# Patient Record
Sex: MALE | Race: WHITE | NOT HISPANIC OR LATINO | URBAN - METROPOLITAN AREA
[De-identification: names, ages, dates, MRNs, and addresses within clinical notes are randomized per-mention and may not be internally consistent; named-entity substitution may affect disease eponyms.]

---

## 2020-08-08 ENCOUNTER — EMERGENCY (EMERGENCY)
Facility: HOSPITAL | Age: 25
LOS: 0 days | Discharge: HOME | End: 2020-08-08
Payer: COMMERCIAL

## 2020-08-08 VITALS
TEMPERATURE: 98 F | DIASTOLIC BLOOD PRESSURE: 54 MMHG | OXYGEN SATURATION: 100 % | HEART RATE: 67 BPM | SYSTOLIC BLOOD PRESSURE: 110 MMHG | RESPIRATION RATE: 18 BRPM

## 2020-08-08 DIAGNOSIS — J02.9 ACUTE PHARYNGITIS, UNSPECIFIED: ICD-10-CM

## 2020-08-08 PROCEDURE — 99283 EMERGENCY DEPT VISIT LOW MDM: CPT

## 2020-08-08 PROCEDURE — U0003: CPT

## 2020-08-08 NOTE — ED STATDOCS - PATIENT PORTAL LINK FT
You can access the FollowMyHealth Patient Portal offered by [facility] by registering at the following website: [email or web address]. By joining [employer]’s FollowMyHealth portal, you will also be able to view your health information using other applications (apps) compatible with our system.

## 2020-08-08 NOTE — ED STATDOCS - NS ED ROS FT
ROS:   Constitutional- no fever, no chills.    ENT- no rhinorrhea, + sore throat, no congestion.    Cardiac- no chest pain, no palpitations,   Respiratory- no cough, no SOB    Abdomen- No nausea, no vomiting, no diarrhea.    Urinary- no dysuria, no urgency, no frequency.    Skin- No rashes

## 2020-08-08 NOTE — ED STATDOCS - PHYSICAL EXAMINATION
Constitutional: NAD AAOx3. Nontoxic, well appearing. Speaking full sentences  w/o distress  Eyes: EOMI, pupils equal  Head: Normocephalic atraumatic  Mouth: no airway obstruction  Cardiac: [id number]   Resp: Lungs CTAB  GI: Abd s/nt/nd  Neuro: motor and sensory intact

## 2020-08-08 NOTE — ED STATDOCS - OBJECTIVE STATEMENT
Pt presents to ED with  sore throat x 1 day. pt is [employer] . Pt unsure if exposed to COVID.   Pt concerned for possible COVID-19.   Pt here for testing.

## 2020-08-09 LAB — SARS-COV-2 RNA SPEC QL NAA+PROBE: SIGNIFICANT CHANGE UP

## 2023-08-05 ENCOUNTER — INPATIENT (INPATIENT)
Facility: HOSPITAL | Age: 28
LOS: 14 days | Discharge: HOME | DRG: 270 | End: 2023-08-20
Attending: INTERNAL MEDICINE | Admitting: INTERNAL MEDICINE
Payer: COMMERCIAL

## 2023-08-05 VITALS — WEIGHT: 160.06 LBS | HEIGHT: 72 IN

## 2023-08-05 DIAGNOSIS — R31.9 HEMATURIA, UNSPECIFIED: ICD-10-CM

## 2023-08-05 DIAGNOSIS — M25.473 EFFUSION, UNSPECIFIED ANKLE: ICD-10-CM

## 2023-08-05 DIAGNOSIS — I82.A12 ACUTE EMBOLISM AND THROMBOSIS OF LEFT AXILLARY VEIN: ICD-10-CM

## 2023-08-05 DIAGNOSIS — R18.8 OTHER ASCITES: ICD-10-CM

## 2023-08-05 DIAGNOSIS — I82.612 ACUTE EMBOLISM AND THROMBOSIS OF SUPERFICIAL VEINS OF LEFT UPPER EXTREMITY: ICD-10-CM

## 2023-08-05 DIAGNOSIS — R20.0 ANESTHESIA OF SKIN: ICD-10-CM

## 2023-08-05 DIAGNOSIS — I82.B12 ACUTE EMBOLISM AND THROMBOSIS OF LEFT SUBCLAVIAN VEIN: ICD-10-CM
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
## 2023-08-05 DIAGNOSIS — R10.12 LEFT UPPER QUADRANT PAIN: ICD-10-CM

## 2023-08-05 DIAGNOSIS — J90 PLEURAL EFFUSION, NOT ELSEWHERE CLASSIFIED: ICD-10-CM

## 2023-08-05 DIAGNOSIS — G47.00 INSOMNIA, UNSPECIFIED: ICD-10-CM

## 2023-08-05 DIAGNOSIS — N14.11 CONTRAST-INDUCED NEPHROPATHY: ICD-10-CM

## 2023-08-05 DIAGNOSIS — N17.0 ACUTE KIDNEY FAILURE WITH TUBULAR NECROSIS: ICD-10-CM

## 2023-08-05 DIAGNOSIS — T50.8X5A ADVERSE EFFECT OF DIAGNOSTIC AGENTS, INITIAL ENCOUNTER: ICD-10-CM

## 2023-08-05 DIAGNOSIS — E87.0 HYPEROSMOLALITY AND HYPERNATREMIA: ICD-10-CM

## 2023-08-05 DIAGNOSIS — N39.0 URINARY TRACT INFECTION, SITE NOT SPECIFIED: ICD-10-CM

## 2023-08-05 DIAGNOSIS — Z53.09 PROCEDURE AND TREATMENT NOT CARRIED OUT BECAUSE OF OTHER CONTRAINDICATION: ICD-10-CM

## 2023-08-05 DIAGNOSIS — K59.00 CONSTIPATION, UNSPECIFIED: ICD-10-CM

## 2023-08-05 DIAGNOSIS — D72.829 ELEVATED WHITE BLOOD CELL COUNT, UNSPECIFIED: ICD-10-CM

## 2023-08-05 DIAGNOSIS — R11.2 NAUSEA WITH VOMITING, UNSPECIFIED: ICD-10-CM

## 2023-08-05 PROCEDURE — 99291 CRITICAL CARE FIRST HOUR: CPT

## 2023-08-05 RX ORDER — SODIUM CHLORIDE 9 MG/ML
1000 INJECTION INTRAMUSCULAR; INTRAVENOUS; SUBCUTANEOUS ONCE
Refills: 0 | Status: COMPLETED | OUTPATIENT
Start: 2023-08-05 | End: 2023-08-05

## 2023-08-05 RX ORDER — ACETAMINOPHEN 500 MG
1000 TABLET ORAL ONCE
Refills: 0 | Status: COMPLETED | OUTPATIENT
Start: 2023-08-05 | End: 2023-08-05

## 2023-08-05 NOTE — ED ADULT NURSE NOTE - OBJECTIVE STATEMENT
28 yo Male co left arm pain since last night. Pt states that he started having left sided shoulder pain yesterday night before sleeping and thought it was a muscle injury from working out. Pt stated in the morning his left arm started swelling and changing color from his fingertips to his biceps. Pt states it feels like his arm is asleep. Pt's left arm visibly bigger, discolored, and colder than right arm. Pt is able to move fingers and denies pain. Pt upper extremities equal in strength. Pt's family member is concerned it could be a clot in his arm. Pt denies blood thinners. MD BANERJEE made aware and evaluated pt. Pt on cardiac monitor

## 2023-08-05 NOTE — ED ADULT NURSE NOTE - CHIEF COMPLAINT QUOTE
Patient having swelling to the left arm from fingers to bicep, noticeable difference from other arm and arm different in color to other arm.  + pulses, denies injury or any type of wound.

## 2023-08-05 NOTE — ED PROVIDER NOTE - OBJECTIVE STATEMENT
28 yo male pw  left arm swelling and pain with purple discoloration that started yesterday. No fever or chills, no ho trauma

## 2023-08-05 NOTE — ED ADULT TRIAGE NOTE - NSWEIGHTCALCTOOLDRUG_GEN_A_CORE
[Fever] : fever [Nasal Congestion] : nasal congestion [Sore Throat] : no sore throat  used [Cough] : no cough [Vomiting] : no vomiting [Diarrhea] : no diarrhea

## 2023-08-05 NOTE — ED PROVIDER NOTE - PHYSICAL EXAMINATION
Gen:  Well appearning in NAD  Head:  NC/AT  Resp: No distress   Ext: no deformities RHD, left arm from shoulder to fingers with swelling, and purple discoloration, warm, +2 radial pulse, MRU intact  Skin: warm and dry as visualized  psych: AAO x 4, pleasant and cooperative

## 2023-08-05 NOTE — ED PROVIDER NOTE - CRITICAL CARE ATTENDING CONTRIBUTION TO CARE
direct patient care (not related to procedure), additional history taking, interpretation of diagnostic studies, documentation, consultation with other physicians, consult w/ pt's family directly relating to pts condition  B Iwona JOHNSON

## 2023-08-05 NOTE — ED PROVIDER NOTE - CLINICAL SUMMARY MEDICAL DECISION MAKING FREE TEXT BOX
pt with left arm swelling since yesterday will get labs and us r/o dvt    1240 spoke withradioliogist pt with occlusive left axillary dvt, placed call to vascular, pt tba and will start heparin

## 2023-08-06 DIAGNOSIS — I82.409 ACUTE EMBOLISM AND THROMBOSIS OF UNSPECIFIED DEEP VEINS OF UNSPECIFIED LOWER EXTREMITY: ICD-10-CM

## 2023-08-06 PROBLEM — Z78.9 OTHER SPECIFIED HEALTH STATUS: Chronic | Status: ACTIVE | Noted: 2020-08-08

## 2023-08-06 LAB
ADD ON TEST-SPECIMEN IN LAB: SIGNIFICANT CHANGE UP
ALBUMIN SERPL ELPH-MCNC: 4.4 G/DL — SIGNIFICANT CHANGE UP (ref 3.3–5)
ALP SERPL-CCNC: 74 U/L — SIGNIFICANT CHANGE UP (ref 40–120)
ALT FLD-CCNC: 23 U/L — SIGNIFICANT CHANGE UP (ref 12–78)
ANION GAP SERPL CALC-SCNC: 6 MMOL/L — SIGNIFICANT CHANGE UP (ref 5–17)
APTT BLD: 122.7 SEC — CRITICAL HIGH (ref 24.5–35.6)
APTT BLD: 59.5 SEC — HIGH (ref 24.5–35.6)
APTT BLD: 70.8 SEC — HIGH (ref 24.5–35.6)
AST SERPL-CCNC: 29 U/L — SIGNIFICANT CHANGE UP (ref 15–37)
BASOPHILS # BLD AUTO: 0.02 K/UL — SIGNIFICANT CHANGE UP (ref 0–0.2)
BASOPHILS NFR BLD AUTO: 0.2 % — SIGNIFICANT CHANGE UP (ref 0–2)
BILIRUB SERPL-MCNC: 0.6 MG/DL — SIGNIFICANT CHANGE UP (ref 0.2–1.2)
BUN SERPL-MCNC: 22 MG/DL — SIGNIFICANT CHANGE UP (ref 7–23)
CALCIUM SERPL-MCNC: 9.6 MG/DL — SIGNIFICANT CHANGE UP (ref 8.5–10.1)
CHLORIDE SERPL-SCNC: 107 MMOL/L — SIGNIFICANT CHANGE UP (ref 96–108)
CO2 SERPL-SCNC: 28 MMOL/L — SIGNIFICANT CHANGE UP (ref 22–31)
CREAT SERPL-MCNC: 1.15 MG/DL — SIGNIFICANT CHANGE UP (ref 0.5–1.3)
D DIMER BLD IA.RAPID-MCNC: 376 NG/ML DDU — HIGH
EGFR: 91 ML/MIN/1.73M2 — SIGNIFICANT CHANGE UP
EOSINOPHIL # BLD AUTO: 0 K/UL — SIGNIFICANT CHANGE UP (ref 0–0.5)
EOSINOPHIL NFR BLD AUTO: 0 % — SIGNIFICANT CHANGE UP (ref 0–6)
GLUCOSE SERPL-MCNC: 122 MG/DL — HIGH (ref 70–99)
HCT VFR BLD CALC: 39.8 % — SIGNIFICANT CHANGE UP (ref 39–50)
HCT VFR BLD CALC: 40.3 % — SIGNIFICANT CHANGE UP (ref 39–50)
HCT VFR BLD CALC: 43 % — SIGNIFICANT CHANGE UP (ref 39–50)
HGB BLD-MCNC: 13.7 G/DL — SIGNIFICANT CHANGE UP (ref 13–17)
HGB BLD-MCNC: 13.8 G/DL — SIGNIFICANT CHANGE UP (ref 13–17)
HGB BLD-MCNC: 15 G/DL — SIGNIFICANT CHANGE UP (ref 13–17)
IMM GRANULOCYTES NFR BLD AUTO: 0.3 % — SIGNIFICANT CHANGE UP (ref 0–0.9)
INR BLD: 1.13 RATIO — SIGNIFICANT CHANGE UP (ref 0.85–1.18)
LYMPHOCYTES # BLD AUTO: 0.71 K/UL — LOW (ref 1–3.3)
LYMPHOCYTES # BLD AUTO: 6 % — LOW (ref 13–44)
MCHC RBC-ENTMCNC: 29.4 PG — SIGNIFICANT CHANGE UP (ref 27–34)
MCHC RBC-ENTMCNC: 29.9 PG — SIGNIFICANT CHANGE UP (ref 27–34)
MCHC RBC-ENTMCNC: 30.1 PG — SIGNIFICANT CHANGE UP (ref 27–34)
MCHC RBC-ENTMCNC: 34 GM/DL — SIGNIFICANT CHANGE UP (ref 32–36)
MCHC RBC-ENTMCNC: 34.7 GM/DL — SIGNIFICANT CHANGE UP (ref 32–36)
MCHC RBC-ENTMCNC: 34.9 GM/DL — SIGNIFICANT CHANGE UP (ref 32–36)
MCV RBC AUTO: 86.3 FL — SIGNIFICANT CHANGE UP (ref 80–100)
MCV RBC AUTO: 86.3 FL — SIGNIFICANT CHANGE UP (ref 80–100)
MCV RBC AUTO: 86.5 FL — SIGNIFICANT CHANGE UP (ref 80–100)
MONOCYTES # BLD AUTO: 0.48 K/UL — SIGNIFICANT CHANGE UP (ref 0–0.9)
MONOCYTES NFR BLD AUTO: 4.1 % — SIGNIFICANT CHANGE UP (ref 2–14)
NEUTROPHILS # BLD AUTO: 10.5 K/UL — HIGH (ref 1.8–7.4)
NEUTROPHILS NFR BLD AUTO: 89.4 % — HIGH (ref 43–77)
PLATELET # BLD AUTO: 198 K/UL — SIGNIFICANT CHANGE UP (ref 150–400)
PLATELET # BLD AUTO: 232 K/UL — SIGNIFICANT CHANGE UP (ref 150–400)
PLATELET # BLD AUTO: 259 K/UL — SIGNIFICANT CHANGE UP (ref 150–400)
POTASSIUM SERPL-MCNC: 4.1 MMOL/L — SIGNIFICANT CHANGE UP (ref 3.5–5.3)
POTASSIUM SERPL-SCNC: 4.1 MMOL/L — SIGNIFICANT CHANGE UP (ref 3.5–5.3)
PROT SERPL-MCNC: 8.4 GM/DL — HIGH (ref 6–8.3)
PROTHROM AB SERPL-ACNC: 12.7 SEC — SIGNIFICANT CHANGE UP (ref 9.5–13)
RBC # BLD: 4.61 M/UL — SIGNIFICANT CHANGE UP (ref 4.2–5.8)
RBC # BLD: 4.66 M/UL — SIGNIFICANT CHANGE UP (ref 4.2–5.8)
RBC # BLD: 4.98 M/UL — SIGNIFICANT CHANGE UP (ref 4.2–5.8)
RBC # FLD: 11.9 % — SIGNIFICANT CHANGE UP (ref 10.3–14.5)
RBC # FLD: 12 % — SIGNIFICANT CHANGE UP (ref 10.3–14.5)
RBC # FLD: 12.2 % — SIGNIFICANT CHANGE UP (ref 10.3–14.5)
SODIUM SERPL-SCNC: 141 MMOL/L — SIGNIFICANT CHANGE UP (ref 135–145)
WBC # BLD: 11.72 K/UL — HIGH (ref 3.8–10.5)
WBC # BLD: 11.75 K/UL — HIGH (ref 3.8–10.5)
WBC # BLD: 8.66 K/UL — SIGNIFICANT CHANGE UP (ref 3.8–10.5)
WBC # FLD AUTO: 11.72 K/UL — HIGH (ref 3.8–10.5)
WBC # FLD AUTO: 11.75 K/UL — HIGH (ref 3.8–10.5)
WBC # FLD AUTO: 8.66 K/UL — SIGNIFICANT CHANGE UP (ref 3.8–10.5)

## 2023-08-06 PROCEDURE — 83735 ASSAY OF MAGNESIUM: CPT

## 2023-08-06 PROCEDURE — 99497 ADVNCD CARE PLAN 30 MIN: CPT | Mod: 25

## 2023-08-06 PROCEDURE — 99222 1ST HOSP IP/OBS MODERATE 55: CPT

## 2023-08-06 PROCEDURE — 87086 URINE CULTURE/COLONY COUNT: CPT

## 2023-08-06 PROCEDURE — 37187 VENOUS MECH THROMBECTOMY: CPT

## 2023-08-06 PROCEDURE — 84300 ASSAY OF URINE SODIUM: CPT

## 2023-08-06 PROCEDURE — 86900 BLOOD TYPING SEROLOGIC ABO: CPT

## 2023-08-06 PROCEDURE — 81001 URINALYSIS AUTO W/SCOPE: CPT

## 2023-08-06 PROCEDURE — 93971 EXTREMITY STUDY: CPT | Mod: LT

## 2023-08-06 PROCEDURE — C1887: CPT

## 2023-08-06 PROCEDURE — 71552 MRI CHEST W/O & W/DYE: CPT

## 2023-08-06 PROCEDURE — C1757: CPT

## 2023-08-06 PROCEDURE — 71275 CT ANGIOGRAPHY CHEST: CPT | Mod: 26

## 2023-08-06 PROCEDURE — 80076 HEPATIC FUNCTION PANEL: CPT

## 2023-08-06 PROCEDURE — C1769: CPT

## 2023-08-06 PROCEDURE — 76705 ECHO EXAM OF ABDOMEN: CPT

## 2023-08-06 PROCEDURE — 86901 BLOOD TYPING SEROLOGIC RH(D): CPT

## 2023-08-06 PROCEDURE — 80048 BASIC METABOLIC PNL TOTAL CA: CPT

## 2023-08-06 PROCEDURE — 71045 X-RAY EXAM CHEST 1 VIEW: CPT

## 2023-08-06 PROCEDURE — 36011 PLACE CATHETER IN VEIN: CPT

## 2023-08-06 PROCEDURE — A9579: CPT

## 2023-08-06 PROCEDURE — C1894: CPT

## 2023-08-06 PROCEDURE — 82570 ASSAY OF URINE CREATININE: CPT

## 2023-08-06 PROCEDURE — C1725: CPT

## 2023-08-06 PROCEDURE — 84100 ASSAY OF PHOSPHORUS: CPT

## 2023-08-06 PROCEDURE — 80053 COMPREHEN METABOLIC PANEL: CPT

## 2023-08-06 PROCEDURE — 82550 ASSAY OF CK (CPK): CPT

## 2023-08-06 PROCEDURE — 93975 VASCULAR STUDY: CPT

## 2023-08-06 PROCEDURE — 86920 COMPATIBILITY TEST SPIN: CPT

## 2023-08-06 PROCEDURE — 71045 X-RAY EXAM CHEST 1 VIEW: CPT | Mod: 26

## 2023-08-06 PROCEDURE — 74018 RADEX ABDOMEN 1 VIEW: CPT

## 2023-08-06 PROCEDURE — 83070 ASSAY OF HEMOSIDERIN QUAL: CPT

## 2023-08-06 PROCEDURE — 86850 RBC ANTIBODY SCREEN: CPT

## 2023-08-06 PROCEDURE — 82150 ASSAY OF AMYLASE: CPT

## 2023-08-06 PROCEDURE — 83874 ASSAY OF MYOGLOBIN: CPT

## 2023-08-06 PROCEDURE — 84156 ASSAY OF PROTEIN URINE: CPT

## 2023-08-06 PROCEDURE — 93971 EXTREMITY STUDY: CPT | Mod: 26,LT

## 2023-08-06 PROCEDURE — C9113: CPT

## 2023-08-06 PROCEDURE — 76770 US EXAM ABDO BACK WALL COMP: CPT

## 2023-08-06 PROCEDURE — 36415 COLL VENOUS BLD VENIPUNCTURE: CPT

## 2023-08-06 PROCEDURE — 76000 FLUOROSCOPY <1 HR PHYS/QHP: CPT

## 2023-08-06 PROCEDURE — 85025 COMPLETE CBC W/AUTO DIFF WBC: CPT

## 2023-08-06 PROCEDURE — 99221 1ST HOSP IP/OBS SF/LOW 40: CPT

## 2023-08-06 PROCEDURE — 71275 CT ANGIOGRAPHY CHEST: CPT

## 2023-08-06 PROCEDURE — 85730 THROMBOPLASTIN TIME PARTIAL: CPT

## 2023-08-06 PROCEDURE — 85610 PROTHROMBIN TIME: CPT

## 2023-08-06 PROCEDURE — 93010 ELECTROCARDIOGRAM REPORT: CPT

## 2023-08-06 PROCEDURE — 76700 US EXAM ABDOM COMPLETE: CPT

## 2023-08-06 PROCEDURE — 83690 ASSAY OF LIPASE: CPT

## 2023-08-06 PROCEDURE — 85027 COMPLETE CBC AUTOMATED: CPT

## 2023-08-06 PROCEDURE — 37248 TRLUML BALO ANGIOP 1ST VEIN: CPT

## 2023-08-06 RX ORDER — HEPARIN SODIUM 5000 [USP'U]/ML
3000 INJECTION INTRAVENOUS; SUBCUTANEOUS EVERY 6 HOURS
Refills: 0 | Status: DISCONTINUED | OUTPATIENT
Start: 2023-08-06 | End: 2023-08-09

## 2023-08-06 RX ORDER — HEPARIN SODIUM 5000 [USP'U]/ML
INJECTION INTRAVENOUS; SUBCUTANEOUS
Qty: 25000 | Refills: 0 | Status: DISCONTINUED | OUTPATIENT
Start: 2023-08-06 | End: 2023-08-09

## 2023-08-06 RX ORDER — ONDANSETRON 8 MG/1
4 TABLET, FILM COATED ORAL EVERY 8 HOURS
Refills: 0 | Status: DISCONTINUED | OUTPATIENT
Start: 2023-08-06 | End: 2023-08-14

## 2023-08-06 RX ORDER — HEPARIN SODIUM 5000 [USP'U]/ML
INJECTION INTRAVENOUS; SUBCUTANEOUS
Qty: 25000 | Refills: 0 | Status: DISCONTINUED | OUTPATIENT
Start: 2023-08-06 | End: 2023-08-06

## 2023-08-06 RX ORDER — HEPARIN SODIUM 5000 [USP'U]/ML
4700 INJECTION INTRAVENOUS; SUBCUTANEOUS ONCE
Refills: 0 | Status: COMPLETED | OUTPATIENT
Start: 2023-08-06 | End: 2023-08-06

## 2023-08-06 RX ORDER — HEPARIN SODIUM 5000 [USP'U]/ML
4700 INJECTION INTRAVENOUS; SUBCUTANEOUS EVERY 6 HOURS
Refills: 0 | Status: DISCONTINUED | OUTPATIENT
Start: 2023-08-06 | End: 2023-08-06

## 2023-08-06 RX ORDER — HEPARIN SODIUM 5000 [USP'U]/ML
6500 INJECTION INTRAVENOUS; SUBCUTANEOUS EVERY 6 HOURS
Refills: 0 | Status: DISCONTINUED | OUTPATIENT
Start: 2023-08-06 | End: 2023-08-09

## 2023-08-06 RX ORDER — ACETAMINOPHEN 500 MG
650 TABLET ORAL EVERY 6 HOURS
Refills: 0 | Status: DISCONTINUED | OUTPATIENT
Start: 2023-08-06 | End: 2023-08-12

## 2023-08-06 RX ORDER — LANOLIN ALCOHOL/MO/W.PET/CERES
3 CREAM (GRAM) TOPICAL AT BEDTIME
Refills: 0 | Status: DISCONTINUED | OUTPATIENT
Start: 2023-08-06 | End: 2023-08-20

## 2023-08-06 RX ADMIN — HEPARIN SODIUM 950 UNIT(S)/HR: 5000 INJECTION INTRAVENOUS; SUBCUTANEOUS at 15:59

## 2023-08-06 RX ADMIN — SODIUM CHLORIDE 2000 MILLILITER(S): 9 INJECTION INTRAMUSCULAR; INTRAVENOUS; SUBCUTANEOUS at 00:46

## 2023-08-06 RX ADMIN — HEPARIN SODIUM 4700 UNIT(S): 5000 INJECTION INTRAVENOUS; SUBCUTANEOUS at 01:42

## 2023-08-06 RX ADMIN — HEPARIN SODIUM 1400 UNIT(S)/HR: 5000 INJECTION INTRAVENOUS; SUBCUTANEOUS at 19:38

## 2023-08-06 RX ADMIN — HEPARIN SODIUM 950 UNIT(S)/HR: 5000 INJECTION INTRAVENOUS; SUBCUTANEOUS at 01:49

## 2023-08-06 RX ADMIN — HEPARIN SODIUM 1400 UNIT(S)/HR: 5000 INJECTION INTRAVENOUS; SUBCUTANEOUS at 17:03

## 2023-08-06 RX ADMIN — Medication 400 MILLIGRAM(S): at 00:46

## 2023-08-06 RX ADMIN — HEPARIN SODIUM 950 UNIT(S)/HR: 5000 INJECTION INTRAVENOUS; SUBCUTANEOUS at 07:17

## 2023-08-06 RX ADMIN — HEPARIN SODIUM 950 UNIT(S)/HR: 5000 INJECTION INTRAVENOUS; SUBCUTANEOUS at 08:51

## 2023-08-06 RX ADMIN — HEPARIN SODIUM 950 UNIT(S)/HR: 5000 INJECTION INTRAVENOUS; SUBCUTANEOUS at 04:45

## 2023-08-06 NOTE — CONSULT NOTE ADULT - ASSESSMENT
otherwise healthy 27-year-old male presents with left upper extremity axillary vein thrombus     unaware of history of trauma   does acknowledge she works out vigorously including lifting weights 4 times a week     no family history of unexplained thromboses     CT angiogram of chest does not reveal any masses     report does acknowledge limited evaluation of axillary and subclavian veins bilaterally     overall impression with a left upper extremity DVT  would be mechanical process possible thoracic outlet syndrome     Case reviewed with hospitalist and surgery consultation     agree with plan to remove thrombus  via IR or vascular surgery     agree with additional radiographic study either MRI or CT to more adequately assess possible thoracic outlet syndrome     if found additional management as per thoracic/vascular surgery service     for completeness can see us in several weeks and an outpatient setting will we will send off comprehensive laboratory studies to rule out a congenital or acquired thrombophilia     based on family and prior history I believe that this is unlikely     above reviewed in detail with patient, mother father and girlfriend at bedside, surgical consult and hospitalist service

## 2023-08-06 NOTE — PROGRESS NOTE ADULT - SUBJECTIVE AND OBJECTIVE BOX
CC: Acute Left Upper Ext DVT     HPI: 26 yo Male presented with complain of  left arm swelling and pain with purple discoloration that started yesterday. No fever or chills, No ho trauma. No recent travel. No cough. No shortness of breath. No recent trauma.         INTERVAL HPI/ OVERNIGHT EVENTS:  chart reviewed, Pt was seen and examined, conformed history above, reports that firs noted L shoulder pain and then developed L arm swelling. Pt denies any medical history, no family history of clot disorders. Exercises but denies use of supplements.  Denies SOB or cough, no recent weight loss. L arm pain improved, still with swelling     Vital Signs Last 24 Hrs  T(C): 36.6 (06 Aug 2023 08:33), Max: 36.8 (05 Aug 2023 23:07)  T(F): 97.9 (06 Aug 2023 08:33), Max: 98.2 (05 Aug 2023 23:07)  HR: 69 (06 Aug 2023 08:33) (57 - 69)  BP: 127/54 (06 Aug 2023 08:33) (121/51 - 127/54)  BP(mean): 95 (06 Aug 2023 02:00) (95 - 95)  RR: 18 (06 Aug 2023 08:33) (17 - 18)  SpO2: 99% (06 Aug 2023 08:33) (99% - 100%)    Parameters below as of 06 Aug 2023 08:33  Patient On (Oxygen Delivery Method): room air        REVIEW OF SYSTEMS:  All other review of systems is negative unless indicated above.      PHYSICAL EXAM:  General: Well developed; in no acute distress  Eyes:   EOMI; conjunctiva and sclera clear  Head: Normocephalic; atraumatic  ENMT: No nasal discharge; airway clear  Neck: Supple; non tender; no masses  Respiratory: Good air entry b/l.  No wheezes, rales or rhonchi  Cardiovascular: Regular rate and rhythm. S1 and S2 Normal; No murmurs  Gastrointestinal: Soft non-tender non-distended; Normal bowel sounds  Genitourinary: No  suprapubic  tenderness  Extremities: LUE  edema, no RUE oe LE edema   Vascular: Peripheral pulses palpable 2+ bilaterally  Neurological: Alert and oriented x3, non focal   Lymph Nodes: No acute cervical adenopathy  Musculoskeletal: Normal muscle tone, without deformities  Psychiatric: Cooperative and appropriate      LABS:                         13.7   11.72 )-----------( 232      ( 06 Aug 2023 07:35 )             40.3     05 Aug 2023 23:39    141    |  107    |  22     ----------------------------<  122    4.1     |  28     |  1.15     Ca    9.6        05 Aug 2023 23:39    TPro  8.4    /  Alb  4.4    /  TBili  0.6    /  DBili  x      /  AST  29     /  ALT  23     /  AlkPhos  74     05 Aug 2023 23:39  PT/INR - ( 05 Aug 2023 23:39 )   PT: 12.7 sec;   INR: 1.13 ratio    PTT - ( 06 Aug 2023 07:35 )  PTT:70.8 sec        LIVER FUNCTIONS - ( 05 Aug 2023 23:39 )  Alb: 4.4 g/dL / Pro: 8.4 gm/dL / ALK PHOS: 74 U/L / ALT: 23 U/L / AST: 29 U/L / GGT: x           Urinalysis Basic - ( 05 Aug 2023 23:39 )  Color: x / Appearance: x / SG: x / pH: x  Gluc: 122 mg/dL / Ketone: x  / Bili: x / Urobili: x   Blood: x / Protein: x / Nitrite: x   Leuk Esterase: x / RBC: x / WBC x   Sq Epi: x / Non Sq Epi: x / Bacteria: x        MEDICATIONS  (STANDING):  heparin  Infusion.  Unit(s)/Hr (9.5 mL/Hr) IV Continuous <Continuous>    MEDICATIONS  (PRN):  acetaminophen     Tablet .. 650 milliGRAM(s) Oral every 6 hours PRN Mild Pain (1 - 3)  aluminum hydroxide/magnesium hydroxide/simethicone Suspension 30 milliLiter(s) Oral every 4 hours PRN Dyspepsia  heparin   Injectable 4700 Unit(s) IV Push every 6 hours PRN For aPTT less than 40  melatonin 3 milliGRAM(s) Oral at bedtime PRN Insomnia  ondansetron Injectable 4 milliGRAM(s) IV Push every 8 hours PRN Nausea and/or Vomiting      RADIOLOGY & ADDITIONAL TESTS:      ACC: [id number] EXAM:  US DPLX UPR EXT VEINS LTD LT   ORDERED BY: LEEANNA WOODS     PROCEDURE DATE:  08/06/2023          INTERPRETATION:  CLINICAL INFORMATION: Left upper extremity swelling.    COMPARISON: None available.    TECHNIQUE: Duplex sonography of the UPPER extremity veins with color and   spectral Doppler, with and without compression.    FINDINGS:    There is nonocclusive thrombus in the left axillary vein. The left   internal jugular, subclavian,  and brachial veins are patent and   compressible where applicable.  The basilic vein (superficial vein) is   patent and without thrombus.  The cephalic vein (superficial vein) is   patent and without thrombus.    Doppler examination shows normal spontaneous and phasic flow.    IMPRESSION:  Occlusive deep vein thrombosis involving the left axillary vein.     Previously Declined (within the last year)

## 2023-08-06 NOTE — PROGRESS NOTE ADULT - ASSESSMENT
A/P:    1. Acute Left upper Ext DVT  - LUE doppler reviewed   - C/w  Heparin drip, will further d/w hematology A/c   - Control pain   - Elevated LUE   - D/w Dr Cobian and Vascular team,  will need further imaging, ordered CTA chest and LUE venogram     2. Leukocytosis  likely reactive  UA  results not suggestive of infection   CXR personally reviewed, no infiltrates to suggest infection   Monitor for fevers         3. DVT PPX: heparin drip     Total time 50 min

## 2023-08-06 NOTE — H&P ADULT - ASSESSMENT
A/P:    1. Acute Left upper Ext DVT  -started on Heparin drip  -follow clinically  -follow Hem/Onc consult    2.  Heparin drip for LE DVT    3.  Code status: Full code.

## 2023-08-06 NOTE — H&P ADULT - HISTORY OF PRESENT ILLNESS
28 yo Male presented with complain of  left arm swelling and pain with purple discoloration that started yesterday. No fever or chills, No ho trauma. No recent travel. No cough. No shortness of breath. No recent trauma.

## 2023-08-06 NOTE — CONSULT NOTE ADULT - ATTENDING COMMENTS
Patient evaluated at the bedside. LUE DVT likely due to TOS. US and CT reviewed. Patient will need venous thrombectomy and eventual first rib resection. AC for now. To coordinate if procedure possible to be done at HNT vs transferring.

## 2023-08-06 NOTE — H&P ADULT - NSHPPHYSICALEXAM_GEN_ALL_CORE
T(C): 36.7 (08-06-23 @ 04:52), Max: 36.8 (08-05-23 @ 23:07)  HR: 57 (08-06-23 @ 04:52) (57 - 62)  BP: 121/51 (08-06-23 @ 04:52) (121/51 - 126/80)  RR: 17 (08-06-23 @ 04:52) (17 - 18)  SpO2: 100% (08-06-23 @ 04:52) (100% - 100%)    CONSTITUTIONAL: Well groomed, no apparent distress  EYES: PERRLA and symmetric, EOMI, No conjunctival or scleral injection, non-icteric  ENMT: Oral mucosa with moist membranes. Normal dentition; no pharyngeal injection or exudates             NECK: Supple, symmetric and without tracheal deviation   RESP: No respiratory distress, no use of accessory muscles; CTA b/l, no WRR  CV: RRR, +S1S2, no MRG; no JVD; no peripheral edema  GI: Soft, NT, ND, no rebound, no guarding; no palpable masses;.  LYMPH: No cervical LAD or tenderness; .  MSK: Left upper Ext: left arm from shoulder to fingers with swelling, and purple.   SKIN: No rashes or ulcers noted; .  NEURO: CN II-XII intact; normal reflexes in upper and lower extremities, sensation intact in upper and lower extremities b/l to light touch   PSYCH: Appropriate insight/judgment; A+O x 3, mood and affect appropriate, recent/remote memory intact

## 2023-08-06 NOTE — CONSULT NOTE ADULT - ASSESSMENT
27yoM presenting with LUE Axillary vein DVT    Plan:  F/u CT venogram   Suspicion for Thoracic outlet syndrome  Continue on systemic anticoagulation  Will likely need thrombectomy this admission and first rib resection as an outpatient  Continue Arm elevation and ACE wrap  Medical management as per primary team    Plan discussed with Dr. Deutsch

## 2023-08-07 LAB
ANION GAP SERPL CALC-SCNC: 2 MMOL/L — LOW (ref 5–17)
APTT BLD: 108.3 SEC — HIGH (ref 24.5–35.6)
APTT BLD: 52.2 SEC — HIGH (ref 24.5–35.6)
APTT BLD: 67.3 SEC — HIGH (ref 24.5–35.6)
BUN SERPL-MCNC: 19 MG/DL — SIGNIFICANT CHANGE UP (ref 7–23)
CALCIUM SERPL-MCNC: 8.9 MG/DL — SIGNIFICANT CHANGE UP (ref 8.5–10.1)
CHLORIDE SERPL-SCNC: 111 MMOL/L — HIGH (ref 96–108)
CO2 SERPL-SCNC: 26 MMOL/L — SIGNIFICANT CHANGE UP (ref 22–31)
CREAT SERPL-MCNC: 0.97 MG/DL — SIGNIFICANT CHANGE UP (ref 0.5–1.3)
EGFR: 111 ML/MIN/1.73M2 — SIGNIFICANT CHANGE UP
GLUCOSE SERPL-MCNC: 92 MG/DL — SIGNIFICANT CHANGE UP (ref 70–99)
HCT VFR BLD CALC: 40.8 % — SIGNIFICANT CHANGE UP (ref 39–50)
HGB BLD-MCNC: 13.9 G/DL — SIGNIFICANT CHANGE UP (ref 13–17)
MCHC RBC-ENTMCNC: 30 PG — SIGNIFICANT CHANGE UP (ref 27–34)
MCHC RBC-ENTMCNC: 34.1 GM/DL — SIGNIFICANT CHANGE UP (ref 32–36)
MCV RBC AUTO: 87.9 FL — SIGNIFICANT CHANGE UP (ref 80–100)
PLATELET # BLD AUTO: 195 K/UL — SIGNIFICANT CHANGE UP (ref 150–400)
POTASSIUM SERPL-MCNC: 4.2 MMOL/L — SIGNIFICANT CHANGE UP (ref 3.5–5.3)
POTASSIUM SERPL-SCNC: 4.2 MMOL/L — SIGNIFICANT CHANGE UP (ref 3.5–5.3)
RBC # BLD: 4.64 M/UL — SIGNIFICANT CHANGE UP (ref 4.2–5.8)
RBC # FLD: 12.1 % — SIGNIFICANT CHANGE UP (ref 10.3–14.5)
SODIUM SERPL-SCNC: 139 MMOL/L — SIGNIFICANT CHANGE UP (ref 135–145)
WBC # BLD: 6.6 K/UL — SIGNIFICANT CHANGE UP (ref 3.8–10.5)
WBC # FLD AUTO: 6.6 K/UL — SIGNIFICANT CHANGE UP (ref 3.8–10.5)

## 2023-08-07 PROCEDURE — 71552 MRI CHEST W/O & W/DYE: CPT | Mod: 26

## 2023-08-07 PROCEDURE — 99233 SBSQ HOSP IP/OBS HIGH 50: CPT

## 2023-08-07 RX ADMIN — HEPARIN SODIUM 3000 UNIT(S): 5000 INJECTION INTRAVENOUS; SUBCUTANEOUS at 21:32

## 2023-08-07 RX ADMIN — HEPARIN SODIUM 1200 UNIT(S)/HR: 5000 INJECTION INTRAVENOUS; SUBCUTANEOUS at 23:08

## 2023-08-07 RX ADMIN — HEPARIN SODIUM 1200 UNIT(S)/HR: 5000 INJECTION INTRAVENOUS; SUBCUTANEOUS at 00:03

## 2023-08-07 RX ADMIN — HEPARIN SODIUM 1000 UNIT(S)/HR: 5000 INJECTION INTRAVENOUS; SUBCUTANEOUS at 06:50

## 2023-08-07 RX ADMIN — HEPARIN SODIUM 1400 UNIT(S)/HR: 5000 INJECTION INTRAVENOUS; SUBCUTANEOUS at 00:01

## 2023-08-07 RX ADMIN — HEPARIN SODIUM 1200 UNIT(S)/HR: 5000 INJECTION INTRAVENOUS; SUBCUTANEOUS at 21:31

## 2023-08-07 RX ADMIN — HEPARIN SODIUM 1000 UNIT(S)/HR: 5000 INJECTION INTRAVENOUS; SUBCUTANEOUS at 07:33

## 2023-08-07 RX ADMIN — HEPARIN SODIUM 1000 UNIT(S)/HR: 5000 INJECTION INTRAVENOUS; SUBCUTANEOUS at 19:38

## 2023-08-07 NOTE — PROGRESS NOTE ADULT - SUBJECTIVE AND OBJECTIVE BOX
HOSPITALIST ATTENDING PROGRESS NOTE    Chart and meds reviewed.  Patient seen and examined.    CC: DVT    Subjective: No acute issues overnight. No fever. Tolerating po.     All other systems reviewed and found to be negative with the exception of what has been described above.    MEDICATIONS  (STANDING):  heparin  Infusion.  Unit(s)/Hr (14 mL/Hr) IV Continuous <Continuous>    MEDICATIONS  (PRN):  acetaminophen     Tablet .. 650 milliGRAM(s) Oral every 6 hours PRN Mild Pain (1 - 3)  aluminum hydroxide/magnesium hydroxide/simethicone Suspension 30 milliLiter(s) Oral every 4 hours PRN Dyspepsia  heparin   Injectable 6500 Unit(s) IV Push every 6 hours PRN For aPTT less than 40  heparin   Injectable 3000 Unit(s) IV Push every 6 hours PRN For aPTT between 40 - 57  melatonin 3 milliGRAM(s) Oral at bedtime PRN Insomnia  ondansetron Injectable 4 milliGRAM(s) IV Push every 8 hours PRN Nausea and/or Vomiting      VITALS:  T(F): 97.9 (08-07-23 @ 09:39), Max: 98.6 (08-06-23 @ 21:27)  HR: 70 (08-07-23 @ 09:39) (60 - 70)  BP: 111/56 (08-07-23 @ 09:39) (111/56 - 115/45)  RR: 18 (08-07-23 @ 09:39) (16 - 18)  SpO2: 100% (08-07-23 @ 09:39) (99% - 100%)      PHYSICAL EXAM:  GEN: NAD  HEENT:  pupils equal and reactive, EOMI, no oropharyngeal lesions, erythema, exudates, oral thrush  NECK:   supple, no carotid bruits, no palpable lymph nodes, no thyromegaly  CV:  +S1, +S2, regular, no murmurs or rubs  RESP:   lungs clear to auscultation bilaterally, no wheezing, rales, rhonchi, good air entry bilaterally  BREAST:  not examined  GI:  abdomen soft, non-tender, non-distended, normal BS, no bruits, no abdominal masses, no palpable masses  RECTAL:  not examined  :  not examined  MSK:   normal muscle tone, no atrophy, no rigidity, no contractions  EXT:  no clubbing, no cyanosis, no edema, no calf pain, swelling or erythema. LUE dressing CDI  VASCULAR:  pulses equal and symmetric in the upper and lower extremities  NEURO:  AAOX3, no focal neurological deficits, follows all commands, able to move extremities spontaneously  SKIN:  no ulcers, lesions or rashes    LABS:                            13.9   6.60  )-----------( 195      ( 07 Aug 2023 06:04 )             40.8     08-07    139  |  111<H>  |  19  ----------------------------<  92  4.2   |  26  |  0.97    Ca    8.9      07 Aug 2023 06:04    TPro  8.4<H>  /  Alb  4.4  /  TBili  0.6  /  DBili  x   /  AST  29  /  ALT  23  /  AlkPhos  74  08-05        LIVER FUNCTIONS - ( 05 Aug 2023 23:39 )  Alb: 4.4 g/dL / Pro: 8.4 gm/dL / ALK PHOS: 74 U/L / ALT: 23 U/L / AST: 29 U/L / GGT: x           PT/INR - ( 05 Aug 2023 23:39 )   PT: 12.7 sec;   INR: 1.13 ratio    PTT - ( 07 Aug 2023 06:04 )  PTT:108.3 sec    Urinalysis Basic - ( 07 Aug 2023 06:04 )  Color: x / Appearance: x / SG: x / pH: x  Gluc: 92 mg/dL / Ketone: x  / Bili: x / Urobili: x   Blood: x / Protein: x / Nitrite: x   Leuk Esterase: x / RBC: x / WBC x   Sq Epi: x / Non Sq Epi: x / Bacteria: x          < from: CT Angio Chest PE Protocol w/ IV Cont (08.06.23 @ 11:03) >  IMPRESSION:  No CT evidence of pulmonary embolus.    Patent superior vena cava and brachiocephalic vein bilaterally.    Limited evaluation of the subclavian and axillary vein bilaterally on   venous phase imaging. If clinically warranted, further evaluation may be   performed with dynamic MRI of the chest with dedicated thoracic outlet   syndrome protocol. Alternatively, repeat CT imaging could be performed   with dedicated thoracic outlet syndrome protocol.    5 mm  left lower lobe lung nodule.    --- End of Report ---    < end of copied text >  < from: US Duplex Venous Upper Ext Ltd, Left (08.06.23 @ 00:29) >  IMPRESSION:  Occlusive deep vein thrombosis involving the left axillary vein.    Findings were discussed by Dr. Escobar with Dr. Bustillo with read back at   12:35 AM.      < end of copied text >

## 2023-08-07 NOTE — PROGRESS NOTE ADULT - ASSESSMENT
27yoM presenting with LUE Axillary vein DVT    Plan:  CT venogram  Suspicion for Thoracic outlet syndrome  Continue on systemic anticoagulation  Will likely need thrombectomy this admission and first rib resection as an outpatient  Continue Arm elevation and ACE wrap  Medical management as per primary team    Will discuss plan with Dr. Kim

## 2023-08-07 NOTE — PROGRESS NOTE ADULT - ASSESSMENT
27-year-old male presents with left upper extremity axillary vein thrombus    -Cause unclear, but suspected mechanical process/thoracic outlet syndrome  -Continuing heparin with improvement in LUE swelling  -CT Angio chest negative  -F/u vascular recs: likely will have thrombectomy and rib surgery  -Will perform hypercoag panel outpt, however can send FVL mutation and prothrombin gene mutation testing while inpatient  -F/u with us after her discharge    Thank you for the courtesy of this consultation and we will continue to follow.    Jayme Godoy MD  [facility]  Cell: [phone]     27-year-old male presents with left upper extremity axillary vein thrombus    -Cause unclear, but suspected mechanical process/thoracic outlet syndrome  -Continuing heparin with improvement in LUE swelling  -CT Angio chest negative  -F/u vascular recs: likely will have thrombectomy and rib surgery  -Will perform hypercoag panel outpt, however can send FVL mutation and prothrombin gene mutation testing while inpatient  -F/u with us after her discharge  -F/u MRI chest to better visualize axillary/subclavian     Thank you for the courtesy of this consultation and we will continue to follow.    Jayem Godoy MD  [facility]  Cell: [phone]

## 2023-08-07 NOTE — PROGRESS NOTE ADULT - SUBJECTIVE AND OBJECTIVE BOX
Hematology/Oncology    Pt seen, reports decreased swelling in LUE  Feeling well  No sob or cp    MEDICATIONS  (STANDING):  heparin  Infusion.  Unit(s)/Hr (14 mL/Hr) IV Continuous <Continuous>    MEDICATIONS  (PRN):  acetaminophen     Tablet .. 650 milliGRAM(s) Oral every 6 hours PRN Mild Pain (1 - 3)  aluminum hydroxide/magnesium hydroxide/simethicone Suspension 30 milliLiter(s) Oral every 4 hours PRN Dyspepsia  heparin   Injectable 6500 Unit(s) IV Push every 6 hours PRN For aPTT less than 40  heparin   Injectable 3000 Unit(s) IV Push every 6 hours PRN For aPTT between 40 - 57  melatonin 3 milliGRAM(s) Oral at bedtime PRN Insomnia  ondansetron Injectable 4 milliGRAM(s) IV Push every 8 hours PRN Nausea and/or Vomiting      ROS  No fever, sweats, chills  No epistaxis, HA, sore throat       Vital Signs Last 24 Hrs  T(C): 36.6 (07 Aug 2023 09:39), Max: 37 (06 Aug 2023 21:27)  T(F): 97.9 (07 Aug 2023 09:39), Max: 98.6 (06 Aug 2023 21:27)  HR: 70 (07 Aug 2023 09:39) (60 - 70)  BP: 111/56 (07 Aug 2023 09:39) (111/56 - 115/45)  BP(mean): 61 (06 Aug 2023 21:27) (61 - 67)  RR: 18 (07 Aug 2023 09:39) (16 - 18)  SpO2: 100% (07 Aug 2023 09:39) (99% - 100%)    Parameters below as of 07 Aug 2023 09:39  Patient On (Oxygen Delivery Method): room air        PE  NAD  Awake, alert  Anicteric, MMM  RRR  CTAB  Abd soft, NT, ND  LUE edema  No rash grossly  FROM                          13.9   6.60  )-----------( 195      ( 07 Aug 2023 06:04 )             40.8       08-07    139  |  111<H>  |  19  ----------------------------<  92  4.2   |  26  |  0.97    Ca    8.9      07 Aug 2023 06:04    TPro  8.4<H>  /  Alb  4.4  /  TBili  0.6  /  DBili  x   /  AST  29  /  ALT  23  /  AlkPhos  74  08-05

## 2023-08-07 NOTE — PROGRESS NOTE ADULT - ASSESSMENT
27 year old with LUE swelling.    # Acute Thrombis LUE  - Seen by [facility] once, fu outpatient for hypercoaguable  work up.  - Needs MRI thoracic to R/U Thoracic outlet syndrome  - Continue heparin drip  - Vascular on board for possible thrombectomy, will  let us know if this can occur in house  - Monitor labs  - No family history of clots    # DVT prophylaxis  - Heparin

## 2023-08-07 NOTE — PROGRESS NOTE ADULT - SUBJECTIVE AND OBJECTIVE BOX
SURGERY DAILY PROGRESS NOTE:     Subjective:  Patient seen and examined at bedside during am rounds. L arm wrapped in ace wrap. Reports mild pain in the LUE.   AVSS. Denies any fevers, chills, n/v/d, chest pain or shortness of breath    Objective:    MEDICATIONS  (STANDING):  heparin  Infusion.  Unit(s)/Hr (14 mL/Hr) IV Continuous <Continuous>    MEDICATIONS  (PRN):  acetaminophen     Tablet .. 650 milliGRAM(s) Oral every 6 hours PRN Mild Pain (1 - 3)  aluminum hydroxide/magnesium hydroxide/simethicone Suspension 30 milliLiter(s) Oral every 4 hours PRN Dyspepsia  heparin   Injectable 6500 Unit(s) IV Push every 6 hours PRN For aPTT less than 40  heparin   Injectable 3000 Unit(s) IV Push every 6 hours PRN For aPTT between 40 - 57  melatonin 3 milliGRAM(s) Oral at bedtime PRN Insomnia  ondansetron Injectable 4 milliGRAM(s) IV Push every 8 hours PRN Nausea and/or Vomiting      Vital Signs Last 24 Hrs  T(C): 37 (06 Aug 2023 21:27), Max: 37 (06 Aug 2023 21:27)  T(F): 98.6 (06 Aug 2023 21:27), Max: 98.6 (06 Aug 2023 21:27)  HR: 60 (06 Aug 2023 21:27) (57 - 69)  BP: 115/45 (06 Aug 2023 21:27) (112/55 - 127/54)  BP(mean): 61 (06 Aug 2023 21:27) (61 - 67)  RR: 16 (06 Aug 2023 21:27) (16 - 18)  SpO2: 99% (06 Aug 2023 21:27) (99% - 100%)    Parameters below as of 06 Aug 2023 21:27  Patient On (Oxygen Delivery Method): room air      PHYSICAL EXAM     General: AAOx3, Well developed, NAD  Chest: Normal respiratory effort  Heart: RRR  Abdomen: Soft, NTND, no masses  Neuro/Psych: No localized deficits. Normal speech, normal tone  Skin: Normal, no rashes, no lesions noted.   Extremities: Warm, well perfused, no edema, Pulses intact  LUE swollen, diffuse erythema, nontender     LABS:                        13.8   8.66  )-----------( 198      ( 06 Aug 2023 23:03 )             39.8     08-05    141  |  107  |  22  ----------------------------<  122<H>  4.1   |  28  |  1.15    Ca    9.6      05 Aug 2023 23:39    TPro  8.4<H>  /  Alb  4.4  /  TBili  0.6  /  DBili  x   /  AST  29  /  ALT  23  /  AlkPhos  74  08-05    PT/INR - ( 05 Aug 2023 23:39 )   PT: 12.7 sec;   INR: 1.13 ratio         PTT - ( 06 Aug 2023 23:03 )  PTT:122.7 sec  Urinalysis Basic - ( 05 Aug 2023 23:39 )    Color: x / Appearance: x / SG: x / pH: x  Gluc: 122 mg/dL / Ketone: x  / Bili: x / Urobili: x   Blood: x / Protein: x / Nitrite: x   Leuk Esterase: x / RBC: x / WBC x   Sq Epi: x / Non Sq Epi: x / Bacteria: x        RADIOLOGY & ADDITIONAL STUDIES:  < from: CT Angio Chest PE Protocol w/ IV Cont (08.06.23 @ 11:03) >    IMPRESSION:  No CT evidence of pulmonary embolus.    Patent superior vena cava and brachiocephalic vein bilaterally.    Limited evaluation of the subclavian and axillary vein bilaterally on   venous phase imaging. If clinically warranted, further evaluation may be   performed with dynamic MRI of the chest with dedicated thoracic outlet   syndrome protocol. Alternatively, repeat CT imaging could be performed   with dedicated thoracic outlet syndrome protocol.    5 mm  left lower lobe lung nodule.    < end of copied text >  < from: US Duplex Venous Upper Ext Ltd, Left (08.06.23 @ 00:29) >  IMPRESSION:  Occlusive deep vein thrombosis involving the left axillary vein.    < end of copied text >

## 2023-08-08 LAB
ANION GAP SERPL CALC-SCNC: 5 MMOL/L — SIGNIFICANT CHANGE UP (ref 5–17)
APTT BLD: 106.5 SEC — HIGH (ref 24.5–35.6)
APTT BLD: 50.3 SEC — HIGH (ref 24.5–35.6)
APTT BLD: 64.9 SEC — HIGH (ref 24.5–35.6)
BUN SERPL-MCNC: 19 MG/DL — SIGNIFICANT CHANGE UP (ref 7–23)
CALCIUM SERPL-MCNC: 9 MG/DL — SIGNIFICANT CHANGE UP (ref 8.5–10.1)
CHLORIDE SERPL-SCNC: 111 MMOL/L — HIGH (ref 96–108)
CO2 SERPL-SCNC: 26 MMOL/L — SIGNIFICANT CHANGE UP (ref 22–31)
CREAT SERPL-MCNC: 0.99 MG/DL — SIGNIFICANT CHANGE UP (ref 0.5–1.3)
EGFR: 108 ML/MIN/1.73M2 — SIGNIFICANT CHANGE UP
GLUCOSE SERPL-MCNC: 101 MG/DL — HIGH (ref 70–99)
HCT VFR BLD CALC: 40.3 % — SIGNIFICANT CHANGE UP (ref 39–50)
HGB BLD-MCNC: 14.1 G/DL — SIGNIFICANT CHANGE UP (ref 13–17)
INR BLD: 1.11 RATIO — SIGNIFICANT CHANGE UP (ref 0.85–1.18)
MCHC RBC-ENTMCNC: 30.1 PG — SIGNIFICANT CHANGE UP (ref 27–34)
MCHC RBC-ENTMCNC: 35 GM/DL — SIGNIFICANT CHANGE UP (ref 32–36)
MCV RBC AUTO: 85.9 FL — SIGNIFICANT CHANGE UP (ref 80–100)
PLATELET # BLD AUTO: 194 K/UL — SIGNIFICANT CHANGE UP (ref 150–400)
POTASSIUM SERPL-MCNC: 4.1 MMOL/L — SIGNIFICANT CHANGE UP (ref 3.5–5.3)
POTASSIUM SERPL-SCNC: 4.1 MMOL/L — SIGNIFICANT CHANGE UP (ref 3.5–5.3)
PROTHROM AB SERPL-ACNC: 12.5 SEC — SIGNIFICANT CHANGE UP (ref 9.5–13)
RBC # BLD: 4.69 M/UL — SIGNIFICANT CHANGE UP (ref 4.2–5.8)
RBC # FLD: 11.9 % — SIGNIFICANT CHANGE UP (ref 10.3–14.5)
SODIUM SERPL-SCNC: 142 MMOL/L — SIGNIFICANT CHANGE UP (ref 135–145)
WBC # BLD: 6.87 K/UL — SIGNIFICANT CHANGE UP (ref 3.8–10.5)
WBC # FLD AUTO: 6.87 K/UL — SIGNIFICANT CHANGE UP (ref 3.8–10.5)

## 2023-08-08 PROCEDURE — 99233 SBSQ HOSP IP/OBS HIGH 50: CPT

## 2023-08-08 RX ORDER — SODIUM CHLORIDE 9 MG/ML
1000 INJECTION, SOLUTION INTRAVENOUS
Refills: 0 | Status: DISCONTINUED | OUTPATIENT
Start: 2023-08-08 | End: 2023-08-09

## 2023-08-08 RX ADMIN — HEPARIN SODIUM 1200 UNIT(S)/HR: 5000 INJECTION INTRAVENOUS; SUBCUTANEOUS at 22:25

## 2023-08-08 RX ADMIN — HEPARIN SODIUM 1200 UNIT(S)/HR: 5000 INJECTION INTRAVENOUS; SUBCUTANEOUS at 18:21

## 2023-08-08 RX ADMIN — HEPARIN SODIUM 1000 UNIT(S)/HR: 5000 INJECTION INTRAVENOUS; SUBCUTANEOUS at 07:57

## 2023-08-08 RX ADMIN — HEPARIN SODIUM 3000 UNIT(S): 5000 INJECTION INTRAVENOUS; SUBCUTANEOUS at 18:24

## 2023-08-08 RX ADMIN — HEPARIN SODIUM 1000 UNIT(S)/HR: 5000 INJECTION INTRAVENOUS; SUBCUTANEOUS at 04:14

## 2023-08-08 RX ADMIN — HEPARIN SODIUM 1000 UNIT(S)/HR: 5000 INJECTION INTRAVENOUS; SUBCUTANEOUS at 12:00

## 2023-08-08 RX ADMIN — HEPARIN SODIUM 1200 UNIT(S)/HR: 5000 INJECTION INTRAVENOUS; SUBCUTANEOUS at 19:30

## 2023-08-08 NOTE — PROGRESS NOTE ADULT - ASSESSMENT
27-year-old male presents with left upper extremity axillary vein thrombus    -MRI reviewed likely from external compression  -Continuing heparin with improvement in LUE swelling  -CT Angio chest negative  -F/u vascular recs, for thrombectomy tomorrow  -Should also have thoracic surg remove obstruction via surgery asap  -f/u vascular for duration of AC    Thank you for the courtesy of this consultation and we will continue to follow.    Jayme Godoy MD  [facility]  Cell: [phone]

## 2023-08-08 NOTE — PROGRESS NOTE ADULT - SUBJECTIVE AND OBJECTIVE BOX
Hematology/Oncology    Pt seen, continuing IV heparin  Swelling continues to decrease    MEDICATIONS  (STANDING):  heparin  Infusion.  Unit(s)/Hr (14 mL/Hr) IV Continuous <Continuous>  lactated ringers. 1000 milliLiter(s) (125 mL/Hr) IV Continuous <Continuous>    MEDICATIONS  (PRN):  acetaminophen     Tablet .. 650 milliGRAM(s) Oral every 6 hours PRN Mild Pain (1 - 3)  aluminum hydroxide/magnesium hydroxide/simethicone Suspension 30 milliLiter(s) Oral every 4 hours PRN Dyspepsia  heparin   Injectable 6500 Unit(s) IV Push every 6 hours PRN For aPTT less than 40  heparin   Injectable 3000 Unit(s) IV Push every 6 hours PRN For aPTT between 40 - 57  melatonin 3 milliGRAM(s) Oral at bedtime PRN Insomnia  ondansetron Injectable 4 milliGRAM(s) IV Push every 8 hours PRN Nausea and/or Vomiting      ROS  No fever, sweats, chills  No epistaxis, HA, sore throat  No CP, SOB, cough, sputum  No n/v/d, abd pain, melena, hematochezia  No edema  No rash  No anxiety  No back pain, joint pain  No bleeding, bruising  No dysuria, hematuria    Vital Signs Last 24 Hrs  T(C): 36.9 (08 Aug 2023 09:11), Max: 36.9 (08 Aug 2023 09:11)  T(F): 98.4 (08 Aug 2023 09:11), Max: 98.4 (08 Aug 2023 09:11)  HR: 60 (08 Aug 2023 09:11) (60 - 61)  BP: 119/61 (08 Aug 2023 09:11) (119/61 - 123/58)  BP(mean): --  RR: 18 (08 Aug 2023 09:11) (18 - 18)  SpO2: 100% (08 Aug 2023 09:11) (97% - 100%)    Parameters below as of 08 Aug 2023 09:11  Patient On (Oxygen Delivery Method): room air        PE  NAD  Awake, alert  Anicteric, MMM  RRR  CTAB  Abd soft, NT, ND  No c/c/e  No rash grossly  FROM                          14.1   6.87  )-----------( 194      ( 08 Aug 2023 03:36 )             40.3       08-08    142  |  111<H>  |  19  ----------------------------<  101<H>  4.1   |  26  |  0.99    Ca    9.0      08 Aug 2023 03:36

## 2023-08-08 NOTE — PROGRESS NOTE ADULT - ASSESSMENT
27 year old with LUE swelling.    # Acute Thrombis LUE  - Seen by Lennox once, fu outpatient for hypercoaguable  work up.  -  MRI suspicious for thoracic outlet syndrome on left side  - Continue heparin drip  - Vascular on board for possible thrombectomy, will  let us know if this can occur in house  - Monitor labs  - No family history of clots  - FU with Ortho for TOS as outpatient     # DVT prophylaxis  - Heparin

## 2023-08-08 NOTE — PROGRESS NOTE ADULT - SUBJECTIVE AND OBJECTIVE BOX
HOSPITALIST ATTENDING PROGRESS NOTE    Chart and meds reviewed.  Patient seen and examined.    CC: DVT    Subjective: Pain and swelling improved. Awaiting thrombectomy.     All other systems reviewed and found to be negative with the exception of what has been described above.    MEDICATIONS  (STANDING):  heparin  Infusion.  Unit(s)/Hr (14 mL/Hr) IV Continuous <Continuous>    MEDICATIONS  (PRN):  acetaminophen     Tablet .. 650 milliGRAM(s) Oral every 6 hours PRN Mild Pain (1 - 3)  aluminum hydroxide/magnesium hydroxide/simethicone Suspension 30 milliLiter(s) Oral every 4 hours PRN Dyspepsia  heparin   Injectable 6500 Unit(s) IV Push every 6 hours PRN For aPTT less than 40  heparin   Injectable 3000 Unit(s) IV Push every 6 hours PRN For aPTT between 40 - 57  melatonin 3 milliGRAM(s) Oral at bedtime PRN Insomnia  ondansetron Injectable 4 milliGRAM(s) IV Push every 8 hours PRN Nausea and/or Vomiting      VITALS:  T(F): 98.4 (08-08-23 @ 09:11), Max: 98.4 (08-08-23 @ 09:11)  HR: 60 (08-08-23 @ 09:11) (60 - 64)  BP: 119/61 (08-08-23 @ 09:11) (115/62 - 123/58)  RR: 18 (08-08-23 @ 09:11) (18 - 18)  SpO2: 100% (08-08-23 @ 09:11) (97% - 100%)      PHYSICAL EXAM:  GEN: NAD  HEENT:  pupils equal and reactive, EOMI, no oropharyngeal lesions, erythema, exudates, oral thrush  NECK:   supple, no carotid bruits, no palpable lymph nodes, no thyromegaly  CV:  +S1, +S2, regular, no murmurs or rubs  RESP:   lungs clear to auscultation bilaterally, no wheezing, rales, rhonchi, good air entry bilaterally  BREAST:  not examined  GI:  abdomen soft, non-tender, non-distended, normal BS, no bruits, no abdominal masses, no palpable masses  RECTAL:  not examined  :  not examined  MSK:   normal muscle tone, no atrophy, no rigidity, no contractions  EXT:  no clubbing, no cyanosis, no edema, no calf pain, swelling or erythema  VASCULAR:  pulses equal and symmetric in the upper and lower extremities  NEURO:  AAOX3, no focal neurological deficits, follows all commands, able to move extremities spontaneously  SKIN:  no ulcers, lesions or rashes. Left UE wrap CDI    LABS:                            14.1   6.87  )-----------( 194      ( 08 Aug 2023 03:36 )             40.3     08-08    142  |  111<H>  |  19  ----------------------------<  101<H>  4.1   |  26  |  0.99    Ca    9.0      08 Aug 2023 03:36            PT/INR - ( 08 Aug 2023 03:36 )   PT: 12.5 sec;   INR: 1.11 ratio    PTT - ( 08 Aug 2023 10:11 )  PTT:64.9 sec    Urinalysis Basic - ( 08 Aug 2023 03:36 )  Color: x / Appearance: x / SG: x / pH: x  Gluc: 101 mg/dL / Ketone: x  / Bili: x / Urobili: x   Blood: x / Protein: x / Nitrite: x   Leuk Esterase: x / RBC: x / WBC x   Sq Epi: x / Non Sq Epi: x / Bacteria: x      < from: MR Chest w/wo IV Cont (08.07.23 @ 16:04) >  IMPRESSION:    Segmental acute thrombosis of the left subclavian and axillary vein,   which terminates centrally at the costoclavicular space between the first   rib and clavicle. Findings are suspicious for a left-sided thoracic   outlet compression.    No discrete anatomic abnormality is identified.      < end of copied text >  < from: CT Angio Chest PE Protocol w/ IV Cont (08.06.23 @ 11:03) >  IMPRESSION:  No CT evidence of pulmonary embolus.    Patent superior vena cava and brachiocephalic vein bilaterally.    Limited evaluation of the subclavian and axillary vein bilaterally on   venous phase imaging. If clinically warranted, further evaluation may be   performed with dynamic MRI of the chest with dedicated thoracic outlet   syndrome protocol. Alternatively, repeat CT imaging could be performed   with dedicated thoracic outlet syndrome protocol.    5 mm  left lower lobe lung nodule.    --- End of Report ---      < end of copied text >

## 2023-08-08 NOTE — PROGRESS NOTE ADULT - SUBJECTIVE AND OBJECTIVE BOX
SURGERY DAILY PROGRESS NOTE:     Subjective:  Patient seen and examined at bedside during am rounds. AVSS. Denies any fevers, chills, n/v/d, chest pain or shortness of breath    Objective:    MEDICATIONS  (STANDING):  heparin  Infusion.  Unit(s)/Hr (14 mL/Hr) IV Continuous <Continuous>    MEDICATIONS  (PRN):  acetaminophen     Tablet .. 650 milliGRAM(s) Oral every 6 hours PRN Mild Pain (1 - 3)  aluminum hydroxide/magnesium hydroxide/simethicone Suspension 30 milliLiter(s) Oral every 4 hours PRN Dyspepsia  heparin   Injectable 6500 Unit(s) IV Push every 6 hours PRN For aPTT less than 40  heparin   Injectable 3000 Unit(s) IV Push every 6 hours PRN For aPTT between 40 - 57  melatonin 3 milliGRAM(s) Oral at bedtime PRN Insomnia  ondansetron Injectable 4 milliGRAM(s) IV Push every 8 hours PRN Nausea and/or Vomiting      Vital Signs Last 24 Hrs  T(C): 36.7 (07 Aug 2023 21:43), Max: 36.8 (07 Aug 2023 16:28)  T(F): 98 (07 Aug 2023 21:43), Max: 98.2 (07 Aug 2023 16:28)  HR: 61 (07 Aug 2023 21:43) (61 - 70)  BP: 123/58 (07 Aug 2023 21:43) (111/56 - 123/58)  BP(mean): --  RR: 18 (07 Aug 2023 21:43) (18 - 18)  SpO2: 97% (07 Aug 2023 21:43) (97% - 100%)    Parameters below as of 07 Aug 2023 21:43  Patient On (Oxygen Delivery Method): room air          PHYSICAL EXAM   Gen: well-appearing, in no acute distress  CV: pulse regularly present   Resp: airway patent, non-labored breathing  Abd: soft, NTND; no rebound or guarding. Incision c/d/i      I&O's Detail      Daily     Daily     LABS:                        13.9   6.60  )-----------( 195      ( 07 Aug 2023 06:04 )             40.8     08-07    139  |  111<H>  |  19  ----------------------------<  92  4.2   |  26  |  0.97    Ca    8.9      07 Aug 2023 06:04      PTT - ( 07 Aug 2023 20:02 )  PTT:52.2 sec  Urinalysis Basic - ( 07 Aug 2023 06:04 )    Color: x / Appearance: x / SG: x / pH: x  Gluc: 92 mg/dL / Ketone: x  / Bili: x / Urobili: x   Blood: x / Protein: x / Nitrite: x   Leuk Esterase: x / RBC: x / WBC x   Sq Epi: x / Non Sq Epi: x / Bacteria: x     SURGERY DAILY PROGRESS NOTE:     Subjective:  Patient seen and examined at bedside during am rounds. AVSS. Denies any fevers, chills, n/v/d, chest pain or shortness of breath    Objective:    MEDICATIONS  (STANDING):  heparin  Infusion.  Unit(s)/Hr (14 mL/Hr) IV Continuous <Continuous>    MEDICATIONS  (PRN):  acetaminophen     Tablet .. 650 milliGRAM(s) Oral every 6 hours PRN Mild Pain (1 - 3)  aluminum hydroxide/magnesium hydroxide/simethicone Suspension 30 milliLiter(s) Oral every 4 hours PRN Dyspepsia  heparin   Injectable 6500 Unit(s) IV Push every 6 hours PRN For aPTT less than 40  heparin   Injectable 3000 Unit(s) IV Push every 6 hours PRN For aPTT between 40 - 57  melatonin 3 milliGRAM(s) Oral at bedtime PRN Insomnia  ondansetron Injectable 4 milliGRAM(s) IV Push every 8 hours PRN Nausea and/or Vomiting      Vital Signs Last 24 Hrs  T(C): 36.7 (07 Aug 2023 21:43), Max: 36.8 (07 Aug 2023 16:28)  T(F): 98 (07 Aug 2023 21:43), Max: 98.2 (07 Aug 2023 16:28)  HR: 61 (07 Aug 2023 21:43) (61 - 70)  BP: 123/58 (07 Aug 2023 21:43) (111/56 - 123/58)  BP(mean): --  RR: 18 (07 Aug 2023 21:43) (18 - 18)  SpO2: 97% (07 Aug 2023 21:43) (97% - 100%)    Parameters below as of 07 Aug 2023 21:43  Patient On (Oxygen Delivery Method): room air          PHYSICAL EXAM   Gen: well-appearing, in no acute distress  CV: pulse regularly present   Resp: airway patent, non-labored breathing  Abd: soft, NTND; no rebound or guarding. Incision c/d/i      I&O's Detail      Daily     Daily     LABS:                        13.9   6.60  )-----------( 195      ( 07 Aug 2023 06:04 )             40.8     08-07    139  |  111<H>  |  19  ----------------------------<  92  4.2   |  26  |  0.97    Ca    8.9      07 Aug 2023 06:04      PTT - ( 07 Aug 2023 20:02 )  PTT:52.2 sec  Urinalysis Basic - ( 07 Aug 2023 06:04 )    Color: x / Appearance: x / SG: x / pH: x  Gluc: 92 mg/dL / Ketone: x  / Bili: x / Urobili: x   Blood: x / Protein: x / Nitrite: x   Leuk Esterase: x / RBC: x / WBC x   Sq Epi: x / Non Sq Epi: x / Bacteria: x      < from: MR Chest w/wo IV Cont (08.07.23 @ 16:04) >    IMPRESSION:    Segmental acute thrombosis of the left subclavian and axillary vein,   which terminates centrally at the costoclavicular space between the first   rib and clavicle. Findings are suspicious for a left-sided thoracic   outlet compression.    No discrete anatomic abnormality is identified.    < end of copied text >

## 2023-08-08 NOTE — PROGRESS NOTE ADULT - ASSESSMENT
27yoM presenting with LUE Axillary vein and subclavian vein DVT  Thoracic outlet syndrome     Plan:  Suspicion for Thoracic outlet syndrome  Continue on systemic anticoagulation  Plan for thrombectomy in the IR suite on Wednesday 8/9  Continue Arm elevation and ACE wrap  Medical management as per primary team    Plan discussed with Dr. Kim 27yoM presenting with LUE Axillary vein and subclavian vein DVT  Thoracic outlet syndrome     Plan:    Continue on systemic anticoagulation  Plan for thrombectomy in the IR suite on Wednesday 8/9  Continue Arm elevation and ACE wrap  Medical management as per primary team    Plan discussed with Dr. Kim

## 2023-08-09 ENCOUNTER — TRANSCRIPTION ENCOUNTER (OUTPATIENT)
Age: 28
End: 2023-08-09

## 2023-08-09 LAB
ABO RH CONFIRMATION: SIGNIFICANT CHANGE UP
ANION GAP SERPL CALC-SCNC: 6 MMOL/L — SIGNIFICANT CHANGE UP (ref 5–17)
APTT BLD: 29.1 SEC — SIGNIFICANT CHANGE UP (ref 24.5–35.6)
APTT BLD: 99.1 SEC — HIGH (ref 24.5–35.6)
BUN SERPL-MCNC: 16 MG/DL — SIGNIFICANT CHANGE UP (ref 7–23)
CALCIUM SERPL-MCNC: 8.8 MG/DL — SIGNIFICANT CHANGE UP (ref 8.5–10.1)
CHLORIDE SERPL-SCNC: 111 MMOL/L — HIGH (ref 96–108)
CO2 SERPL-SCNC: 25 MMOL/L — SIGNIFICANT CHANGE UP (ref 22–31)
CREAT SERPL-MCNC: 0.89 MG/DL — SIGNIFICANT CHANGE UP (ref 0.5–1.3)
EGFR: 122 ML/MIN/1.73M2 — SIGNIFICANT CHANGE UP
GLUCOSE SERPL-MCNC: 93 MG/DL — SIGNIFICANT CHANGE UP (ref 70–99)
HCT VFR BLD CALC: 40.8 % — SIGNIFICANT CHANGE UP (ref 39–50)
HGB BLD-MCNC: 14 G/DL — SIGNIFICANT CHANGE UP (ref 13–17)
INR BLD: 1.08 RATIO — SIGNIFICANT CHANGE UP (ref 0.85–1.18)
INR BLD: 1.09 RATIO — SIGNIFICANT CHANGE UP (ref 0.85–1.18)
MAGNESIUM SERPL-MCNC: 2.2 MG/DL — SIGNIFICANT CHANGE UP (ref 1.6–2.6)
MCHC RBC-ENTMCNC: 29.2 PG — SIGNIFICANT CHANGE UP (ref 27–34)
MCHC RBC-ENTMCNC: 34.3 GM/DL — SIGNIFICANT CHANGE UP (ref 32–36)
MCV RBC AUTO: 85.2 FL — SIGNIFICANT CHANGE UP (ref 80–100)
PHOSPHATE SERPL-MCNC: 4.3 MG/DL — SIGNIFICANT CHANGE UP (ref 2.5–4.5)
PLATELET # BLD AUTO: 227 K/UL — SIGNIFICANT CHANGE UP (ref 150–400)
POTASSIUM SERPL-MCNC: 4 MMOL/L — SIGNIFICANT CHANGE UP (ref 3.5–5.3)
POTASSIUM SERPL-SCNC: 4 MMOL/L — SIGNIFICANT CHANGE UP (ref 3.5–5.3)
PROTHROM AB SERPL-ACNC: 12.2 SEC — SIGNIFICANT CHANGE UP (ref 9.5–13)
PROTHROM AB SERPL-ACNC: 12.3 SEC — SIGNIFICANT CHANGE UP (ref 9.5–13)
RBC # BLD: 4.79 M/UL — SIGNIFICANT CHANGE UP (ref 4.2–5.8)
RBC # FLD: 11.9 % — SIGNIFICANT CHANGE UP (ref 10.3–14.5)
SODIUM SERPL-SCNC: 142 MMOL/L — SIGNIFICANT CHANGE UP (ref 135–145)
WBC # BLD: 6.48 K/UL — SIGNIFICANT CHANGE UP (ref 3.8–10.5)
WBC # FLD AUTO: 6.48 K/UL — SIGNIFICANT CHANGE UP (ref 3.8–10.5)

## 2023-08-09 PROCEDURE — 37187 VENOUS MECH THROMBECTOMY: CPT | Mod: LT

## 2023-08-09 PROCEDURE — 37248 TRLUML BALO ANGIOP 1ST VEIN: CPT | Mod: LT

## 2023-08-09 PROCEDURE — 93971 EXTREMITY STUDY: CPT | Mod: 26,LT

## 2023-08-09 PROCEDURE — 76937 US GUIDE VASCULAR ACCESS: CPT | Mod: 26

## 2023-08-09 PROCEDURE — 99232 SBSQ HOSP IP/OBS MODERATE 35: CPT

## 2023-08-09 RX ORDER — RIVAROXABAN 15 MG-20MG
15 KIT ORAL
Refills: 0 | Status: DISCONTINUED | OUTPATIENT
Start: 2023-08-09 | End: 2023-08-09

## 2023-08-09 RX ORDER — HEPARIN SODIUM 5000 [USP'U]/ML
6500 INJECTION INTRAVENOUS; SUBCUTANEOUS EVERY 6 HOURS
Refills: 0 | Status: DISCONTINUED | OUTPATIENT
Start: 2023-08-09 | End: 2023-08-10

## 2023-08-09 RX ORDER — HEPARIN SODIUM 5000 [USP'U]/ML
6500 INJECTION INTRAVENOUS; SUBCUTANEOUS ONCE
Refills: 0 | Status: COMPLETED | OUTPATIENT
Start: 2023-08-09 | End: 2023-08-09

## 2023-08-09 RX ORDER — APIXABAN 2.5 MG/1
2 TABLET, FILM COATED ORAL
Qty: 72 | Refills: 0
Start: 2023-08-09 | End: 2023-08-15

## 2023-08-09 RX ORDER — HYDROMORPHONE HYDROCHLORIDE 2 MG/ML
0.5 INJECTION INTRAMUSCULAR; INTRAVENOUS; SUBCUTANEOUS
Refills: 0 | Status: DISCONTINUED | OUTPATIENT
Start: 2023-08-09 | End: 2023-08-09

## 2023-08-09 RX ORDER — RIVAROXABAN 15 MG-20MG
1 KIT ORAL
Qty: 1 | Refills: 0
Start: 2023-08-09

## 2023-08-09 RX ORDER — APIXABAN 2.5 MG/1
10 TABLET, FILM COATED ORAL EVERY 12 HOURS
Refills: 0 | Status: DISCONTINUED | OUTPATIENT
Start: 2023-08-09 | End: 2023-08-09

## 2023-08-09 RX ORDER — ONDANSETRON 8 MG/1
4 TABLET, FILM COATED ORAL ONCE
Refills: 0 | Status: DISCONTINUED | OUTPATIENT
Start: 2023-08-09 | End: 2023-08-09

## 2023-08-09 RX ORDER — HEPARIN SODIUM 5000 [USP'U]/ML
3000 INJECTION INTRAVENOUS; SUBCUTANEOUS EVERY 6 HOURS
Refills: 0 | Status: DISCONTINUED | OUTPATIENT
Start: 2023-08-09 | End: 2023-08-10

## 2023-08-09 RX ORDER — HEPARIN SODIUM 5000 [USP'U]/ML
INJECTION INTRAVENOUS; SUBCUTANEOUS
Qty: 25000 | Refills: 0 | Status: DISCONTINUED | OUTPATIENT
Start: 2023-08-09 | End: 2023-08-10

## 2023-08-09 RX ORDER — SODIUM CHLORIDE 9 MG/ML
1000 INJECTION, SOLUTION INTRAVENOUS
Refills: 0 | Status: DISCONTINUED | OUTPATIENT
Start: 2023-08-09 | End: 2023-08-09

## 2023-08-09 RX ADMIN — HEPARIN SODIUM 6500 UNIT(S): 5000 INJECTION INTRAVENOUS; SUBCUTANEOUS at 19:44

## 2023-08-09 RX ADMIN — HYDROMORPHONE HYDROCHLORIDE 0.5 MILLIGRAM(S): 2 INJECTION INTRAMUSCULAR; INTRAVENOUS; SUBCUTANEOUS at 10:49

## 2023-08-09 RX ADMIN — HEPARIN SODIUM 1400 UNIT(S)/HR: 5000 INJECTION INTRAVENOUS; SUBCUTANEOUS at 19:45

## 2023-08-09 RX ADMIN — HEPARIN SODIUM 1200 UNIT(S)/HR: 5000 INJECTION INTRAVENOUS; SUBCUTANEOUS at 00:50

## 2023-08-09 RX ADMIN — SODIUM CHLORIDE 125 MILLILITER(S): 9 INJECTION, SOLUTION INTRAVENOUS at 00:02

## 2023-08-09 NOTE — DISCHARGE NOTE PROVIDER - PROVIDER TOKENS
PROVIDER:[TOKEN:[[id number]]],PROVIDER:[TOKEN:[[id number]]] PROVIDER:[TOKEN:[[id number]]],PROVIDER:[TOKEN:[[id number]]],PROVIDER:[TOKEN:[[id number]],FOLLOWUP:[2 weeks]],PROVIDER:[TOKEN:[[id number]],FOLLOWUP:[2 weeks]] PROVIDER:[TOKEN:[[id number]]],PROVIDER:[TOKEN:[[id number]],FOLLOWUP:[2 weeks]],PROVIDER:[TOKEN:[[id number]],FOLLOWUP:[2 weeks]],PROVIDER:[TOKEN:[[id number]],FOLLOWUP:[1 week]]

## 2023-08-09 NOTE — BRIEF OPERATIVE NOTE - NSICDXBRIEFPREOP_GEN_ALL_CORE_FT
PRE-OP DIAGNOSIS:  Venous thoracic outlet syndrome of left subclavian vein 09-Aug-2023 21:38:43  Ariel Zamora

## 2023-08-09 NOTE — DISCHARGE NOTE PROVIDER - CARE PROVIDERS DIRECT ADDRESSES
,DirectAddress_Unknown,[email or web address] ,[email or web address],[email or web address],DirectAddress_Unknown,DirectAddress_Unknown ,DirectAddress_Unknown,DirectAddress_Unknown,DirectAddress_Unknown,[email or web address]

## 2023-08-09 NOTE — DISCHARGE NOTE PROVIDER - NPI NUMBER (FOR SYSADMIN USE ONLY) :
[[id number]],[[id number] [[id number]],[[id number]],[[id number]],[[id number]] [[id number]],[[id number]],[[id number]]

## 2023-08-09 NOTE — DISCHARGE NOTE PROVIDER - NSDCFUADDINST_GEN_ALL_CORE_FT
You may take over the counter tylenol or ibuprofen as needed for pain every 6 hours. Please follow up with all doctors listed.

## 2023-08-09 NOTE — PROVIDER CONTACT NOTE (CHANGE IN STATUS NOTIFICATION) - SITUATION
"Chief Complaint   Patient presents with   • Coronary Artery Disease     F/V Dx: Coronary artery disease involving native coronary artery of native heart without angina pectoris   • Dyslipidemia     Subjective     Nancy King is a 86 y.o. female who presents today for follow up on exertional chest tightness associated with shortness of breath and fatigue.    She is a patient of Dr. Obrien in our office. Hx of CAD with prior abnormal MPI in '18, HTN, HLD, GERD, depression, hypothyroidism, breast CA, and DVT with PE on chronic anticoagulation.    She presents today with her  Russ. Unfortunately, they missed her stress test appointment, coumadin call, and Will's appointment this morning due to confusion.    She continues to have elevated BP readings. They didn't increase the losartan dose as recommended by Dr. Obrien at the last appointment. She continues with exertional chest pain, shortness of breath, and fatigue.    She has chronic LE edema, worse on the L leg with prior DVT. She has lightheadedness with exertion. No palpitations or falls.    Past Medical History:   Diagnosis Date   • Acute venous embolism and thrombosis of unspecified deep vessels of lower extremity    • Angina pectoris 5/11/2012    \"with stress\"   • Benign essential HTN 5/11/2012   • CAD (coronary artery disease) 5/11/2012   • Cancer (HCC)     right breast   • Chronic anticoagulation 5/11/2012   • Heart burn    • Hyperlipidemia 5/11/2012   • Hyperlipoproteinemia 5/11/2012   • Indigestion    • Obesity 5/11/2012   • Other specified disorder of intestines     diarrhea   • Pulmonary embolism (HCC)    • Unspecified cataract     removed bilat   • Unspecified disorder of thyroid    • Unspecified urinary incontinence      Past Surgical History:   Procedure Laterality Date   • MASTECTOMY  1/12/2015    Performed by Dwihgt Birch M.D. at SURGERY [facility]   • NODE BIOPSY SENTINEL  1/12/2015    Performed by Dwight Birch M.D. at " "SURGERY [facility]   • AXILLARY NODE DISSECTION  1/12/2015    Performed by Dwight Birch M.D. at SURGERY [facility]   • APPENDECTOMY     • CHOLECYSTECTOMY     • HYSTERECTOMY, TOTAL ABDOMINAL     • OTHER      Bladder   • TONSILLECTOMY AND ADENOIDECTOMY       Family History   Problem Relation Age of Onset   • Heart Disease Mother    • Heart Disease Father      Social History     Socioeconomic History   • Marital status:      Spouse name: Not on file   • Number of children: Not on file   • Years of education: Not on file   • Highest education level: Not on file   Occupational History   • Not on file   Tobacco Use   • Smoking status: Never Smoker   • Smokeless tobacco: Never Used   Vaping Use   • Vaping Use: Never used   Substance and Sexual Activity   • Alcohol use: Yes     Comment: \"Couple drinks every night\"   • Drug use: No   • Sexual activity: Not on file   Other Topics Concern   • Not on file   Social History Narrative   • Not on file     Social Determinants of Health     Financial Resource Strain: Not on file   Food Insecurity: Not on file   Transportation Needs: Not on file   Physical Activity: Not on file   Stress: Not on file   Social Connections: Not on file   Intimate Partner Violence: Not on file   Housing Stability: Not on file     Allergies   Allergen Reactions   • Sulfa Drugs      Swelling   • Levaquin      Rash     Outpatient Encounter Medications as of 4/14/2022   Medication Sig Dispense Refill   • levothyroxine (SYNTHROID) 50 MCG Tab Take 1 Tablet by mouth every morning before breakfast. 90 Tablet 0   • SITagliptin (JANUVIA) 100 MG Tab Take 1 Tablet by mouth every day. 90 Tablet 0   • metoprolol tartrate (LOPRESSOR) 25 MG Tab Take 1 Tablet by mouth 2 times a day. 180 Tablet 3   • nitroglycerin (NITROSTAT) 0.4 MG SL Tab Place 1 Tablet under the tongue as needed for Chest Pain. 25 Tablet 5   • simvastatin (ZOCOR) 40 MG Tab Take 1 Tablet by mouth every evening. 90 Tablet 3   • losartan " "(COZAAR) 100 MG Tab Take 1 Tablet by mouth every day. 90 Tablet 3   • warfarin (COUMADIN) 1 MG Tab TAKE 2 AND 1/2 TABLETS BY  MOUTH DAILY AS DIRECTED BY  [facility] 225 Tablet 3   • Capsaicin-Menthol (PAIN RELIEF EX) Apply  topically as needed.     • [DISCONTINUED] metoprolol tartrate (LOPRESSOR) 25 MG Tab TAKE 1 TABLET BY MOUTH  TWICE DAILY 180 Tablet 0   • [DISCONTINUED] nitroglycerin (NITROSTAT) 0.4 MG SL Tab Place 1 tablet under the tongue as needed for Chest Pain. 25 tablet 5   • [DISCONTINUED] SITagliptin (JANUVIA) 100 MG Tab Take 100 mg by mouth every day.     • [DISCONTINUED] levothyroxine (SYNTHROID) 50 MCG TABS Take 50 mcg by mouth every morning before breakfast.       No facility-administered encounter medications on file as of 4/14/2022.     Review of Systems   Constitutional: Positive for malaise/fatigue. Negative for fever.   Respiratory: Positive for shortness of breath. Negative for cough.    Cardiovascular: Positive for chest pain and leg swelling. Negative for palpitations, orthopnea, claudication and PND.   Gastrointestinal: Negative for abdominal pain.   Musculoskeletal: Negative for myalgias.              Objective     /58 (BP Location: Left arm, Patient Position: Sitting, BP Cuff Size: Adult)   Pulse (!) 56   Resp 16   Ht 1.651 m (5' 5\")   Wt 80.7 kg (178 lb)   SpO2 97%   BMI 29.62 kg/m²     Physical Exam  Vitals and nursing note reviewed.   Constitutional:       Appearance: Normal appearance. She is well-developed.   HENT:      Head: Normocephalic and atraumatic.   Neck:      Vascular: No JVD.   Cardiovascular:      Rate and Rhythm: Normal rate and regular rhythm.      Pulses: Normal pulses.      Heart sounds: Normal heart sounds.   Pulmonary:      Effort: Pulmonary effort is normal.      Breath sounds: Normal breath sounds.   Musculoskeletal:         General: Normal range of motion.      Right lower leg: Edema present.      Left lower leg: Edema present. "   Skin:     General: Skin is warm and dry.      Capillary Refill: Capillary refill takes less than 2 seconds.   Neurological:      General: No focal deficit present.      Mental Status: She is alert and oriented to person, place, and time. Mental status is at baseline.   Psychiatric:         Mood and Affect: Mood normal.         Behavior: Behavior normal.         Thought Content: Thought content normal.         Judgment: Judgment normal.                Assessment & Plan     1. Angina at rest ([facility])  NM-CARDIAC STRESS TEST   2. Benign essential HTN     3. Coronary artery disease involving native coronary artery of native heart without angina pectoris  nitroglycerin (NITROSTAT) 0.4 MG SL Tab    NM-CARDIAC STRESS TEST   4. Hyperlipoproteinemia     5. Pulmonary embolism, unspecified chronicity, unspecified pulmonary embolism type, unspecified whether acute cor pulmonale present ([facility])     6. Acute thromboembolism of deep veins of lower extremity, unspecified laterality ([facility])     7. Dyslipidemia     8. Chronic anticoagulation     9. Other chest pain  nitroglycerin (NITROSTAT) 0.4 MG SL Tab     Medical Decision Making: Today's Assessment/Status/Plan:      1. CAD with exertional angina  -consider stress v. Cath  -prior MPI abnormal in '18  -will check in with patient tomorrow or Monday on BP readings and symptoms with RN  -stress scheduled in 2 weeks for eval as planned  -cont statin, no ASA on coumadin  -cont bb  -nitro education completed, ER precautions given    2. HTN  -uncontrolled  -increase losartan to 100 mg QD  -cont metoprolol at 25 mg BID  -BP goal <130/80  -RN to follow BP log at home alongside patient    3. HLD  -statin  -LDL goal <70  -repeat lipid/cmp annually    4. DVT with PE  -managed by anticoagulation clinic  -patient will call when she gets home    Patient is to follow up with Dr. Obrien in 2 months with stress v. MPI and echo.                         Swelling noted in left upper arm

## 2023-08-09 NOTE — DISCHARGE NOTE PROVIDER - NSDCFUSCHEDAPPT_GEN_ALL_CORE_FT
Moose Oneal  [facility] [address]  Scheduled Appointment: 08/24/2023

## 2023-08-09 NOTE — PROGRESS NOTE ADULT - ASSESSMENT
27yoM presenting with LUE Axillary vein and subclavian vein DVT  Thoracic outlet syndrome   Scheduled for thrombectomy today     Plan:    Hold heparin drip at 6:30 am   Continue Arm elevation and ACE wrap  Medical management as per primary team    Plan discussed with Dr. Kim 27yoM presenting with LUE Axillary vein and subclavian vein DVT  Thoracic outlet syndrome   Scheduled for thrombectomy today     Plan:    Continue AC with hep drip  Continue Arm elevation and ACE wrap  Medical management as per primary team    Plan discussed with Dr. Kim

## 2023-08-09 NOTE — DISCHARGE NOTE PROVIDER - CARE PROVIDER_API CALL
Naren Kim  Vascular Surgery  [address], [address]  [place] [address]  Phone: [phone]  Fax: [phone]  Follow Up Time:     Moose Oneal  Thoracic Surgery  [address] [place] [address]  Phone: [phone]  Fax: [phone]  Follow Up Time:    Naren Kim  Vascular Surgery  [address], [address]  [place] [address]  Phone: [phone]  Fax: [phone]  Follow Up Time:     Moose Oneal  Thoracic Surgery  [address] [place] [address]  Phone: [phone]  Fax: [phone]  Follow Up Time:     Tenzin Freeman  Nephrology  [address], [address]  [place] [address]  Phone: [phone]  Fax: [phone]  Follow Up Time: 2 weeks    Jayme Godoy  Hematology  [address], [address]  [place] [address]  Phone: [phone]  Fax: [phone]  Follow Up Time: 2 weeks   Naren Kim  Vascular Surgery  [address], [address]  [place] [address]  Phone: [phone]  Fax: [phone]  Follow Up Time:     Tenzin Freeman  Nephrology  [address], [address]  [place] [address]  Phone: [phone]  Fax: [phone]  Follow Up Time: 2 weeks    Jayme Godoy  Hematology  [address], [address]  [place] [address]  Phone: [phone]  Fax: [phone]  Follow Up Time: 2 weeks    Leif Harris  Thoracic Surgery  [address] [place] [address]  Phone: [phone]  Fax: [phone]  Follow Up Time: 1 week

## 2023-08-09 NOTE — DISCHARGE NOTE PROVIDER - HOSPITAL COURSE
27 year old with LUE swelling.    # Acute Thrombis LUE  - Seen by Lennox once, fu outpatient for hypercoaguable  work up.  -  MRI suspicious for thoracic outlet syndrome on left side  - Continue heparin drip  - Vascular on board for possible thrombectomy, will  let us know if this can occur in house  - Monitor labs  - No family history of clots  - FU with Ortho for TOS as outpatient 27 year old presented with acute thrombosis of LUE. He underwent thrombectomy x 2. Thrombosis improved, he was on Heparin while in the hiospital. He is now on Eliquis. MRI suspicious for thoracic outlet syndrome on left side. CT surgery planning for first rib resection. 27 year old presented with acute thrombosis of LUE. He underwent thrombectomy x 2. Thrombosis improved, he was on Heparin while in the hospital. He is now on Eliquis. MRI suspicious for thoracic outlet syndrome on left side. CT surgery planning for first rib resection. He did develop an CORNELIO that has been improving.

## 2023-08-09 NOTE — BRIEF OPERATIVE NOTE - OPERATION/FINDINGS
Clot noted thoughout subclavian and axillary vein  INARI InThrill device used to remove clot  Balloon angioplasty of subclavian/axillary vein  Clot absent on post-treatment venogram

## 2023-08-09 NOTE — PROGRESS NOTE ADULT - SUBJECTIVE AND OBJECTIVE BOX
SURGERY DAILY PROGRESS NOTE:     Subjective:  Patient seen and examined at bedside during am rounds. AVSS. Denies any fevers, chills, n/v/d, chest pain or shortness of breath    Objective:    Vital Signs Last 24 Hrs  T(C): 37.1 (08 Aug 2023 21:01), Max: 37.1 (08 Aug 2023 21:01)  T(F): 98.8 (08 Aug 2023 21:01), Max: 98.8 (08 Aug 2023 21:01)  HR: 74 (08 Aug 2023 21:01) (60 - 74)  BP: 116/72 (08 Aug 2023 21:01) (113/66 - 119/61)  BP(mean): 77 (08 Aug 2023 17:32) (77 - 77)  RR: 19 (08 Aug 2023 21:01) (18 - 20)  SpO2: 98% (08 Aug 2023 21:01) (98% - 100%)    Parameters below as of 08 Aug 2023 21:01  Patient On (Oxygen Delivery Method): room air          PHYSICAL EXAM   Gen: well-appearing, in no acute distress  CV: pulse regularly present   Resp: airway patent, non-labored breathing  Abd: soft, NTND; no rebound or guarding  Extremities: LUE swelling improved, erythema improved, radial pulse palpable      I&O's Detail      Daily     Daily     LABS:                        14.1   6.87  )-----------( 194      ( 08 Aug 2023 03:36 )             40.3   08-08    142  |  111<H>  |  19  ----------------------------<  101<H>  4.1   |  26  |  0.99    Ca    9.0      08 Aug 2023 03:36    PT/INR - ( 08 Aug 2023 03:36 )   PT: 12.5 sec;   INR: 1.11 ratio         PTT - ( 09 Aug 2023 00:22 )  PTT:99.1 sec

## 2023-08-09 NOTE — DISCHARGE NOTE PROVIDER - NSDCCPCAREPLAN_GEN_ALL_CORE_FT
PRINCIPAL DISCHARGE DIAGNOSIS  Diagnosis: Deep vein thrombosis (DVT)  Assessment and Plan of Treatment: You had a DVT in your arm. It was removed by the vascular surgeon. You are going to be on a blood thinner for at least 3 months but you will also fu with Dr. Oneal for evaluation of thoracic outlet. You will fu with Dr. Kim in 2 weeks. You are not to return to work until cleared by Dr. Kim.

## 2023-08-10 ENCOUNTER — TRANSCRIPTION ENCOUNTER (OUTPATIENT)
Age: 28
End: 2023-08-10

## 2023-08-10 LAB
ANION GAP SERPL CALC-SCNC: 4 MMOL/L — LOW (ref 5–17)
ANION GAP SERPL CALC-SCNC: 4 MMOL/L — LOW (ref 5–17)
APPEARANCE UR: CLEAR — SIGNIFICANT CHANGE UP
APTT BLD: 116.7 SEC — HIGH (ref 24.5–35.6)
APTT BLD: 88.5 SEC — HIGH (ref 24.5–35.6)
APTT BLD: > 200 SEC (ref 24.5–35.6)
BACTERIA # UR AUTO: ABNORMAL
BILIRUB UR-MCNC: NEGATIVE — SIGNIFICANT CHANGE UP
BUN SERPL-MCNC: 12 MG/DL — SIGNIFICANT CHANGE UP (ref 7–23)
BUN SERPL-MCNC: 19 MG/DL — SIGNIFICANT CHANGE UP (ref 7–23)
CALCIUM SERPL-MCNC: 9 MG/DL — SIGNIFICANT CHANGE UP (ref 8.5–10.1)
CALCIUM SERPL-MCNC: 9.1 MG/DL — SIGNIFICANT CHANGE UP (ref 8.5–10.1)
CHLORIDE SERPL-SCNC: 106 MMOL/L — SIGNIFICANT CHANGE UP (ref 96–108)
CHLORIDE SERPL-SCNC: 110 MMOL/L — HIGH (ref 96–108)
CO2 SERPL-SCNC: 25 MMOL/L — SIGNIFICANT CHANGE UP (ref 22–31)
CO2 SERPL-SCNC: 26 MMOL/L — SIGNIFICANT CHANGE UP (ref 22–31)
COLOR SPEC: ABNORMAL
COMMENT - URINE: SIGNIFICANT CHANGE UP
CREAT SERPL-MCNC: 0.97 MG/DL — SIGNIFICANT CHANGE UP (ref 0.5–1.3)
CREAT SERPL-MCNC: 1.75 MG/DL — HIGH (ref 0.5–1.3)
DIFF PNL FLD: ABNORMAL
EGFR: 111 ML/MIN/1.73M2 — SIGNIFICANT CHANGE UP
EGFR: 55 ML/MIN/1.73M2 — LOW
EPI CELLS # UR: NEGATIVE — SIGNIFICANT CHANGE UP
GLUCOSE SERPL-MCNC: 142 MG/DL — HIGH (ref 70–99)
GLUCOSE SERPL-MCNC: 99 MG/DL — SIGNIFICANT CHANGE UP (ref 70–99)
GLUCOSE UR QL: NEGATIVE — SIGNIFICANT CHANGE UP
HCT VFR BLD CALC: 40 % — SIGNIFICANT CHANGE UP (ref 39–50)
HCT VFR BLD CALC: 40.2 % — SIGNIFICANT CHANGE UP (ref 39–50)
HCT VFR BLD CALC: 41.9 % — SIGNIFICANT CHANGE UP (ref 39–50)
HGB BLD-MCNC: 13.9 G/DL — SIGNIFICANT CHANGE UP (ref 13–17)
HGB BLD-MCNC: 14.2 G/DL — SIGNIFICANT CHANGE UP (ref 13–17)
HGB BLD-MCNC: 14.6 G/DL — SIGNIFICANT CHANGE UP (ref 13–17)
INR BLD: 1.16 RATIO — SIGNIFICANT CHANGE UP (ref 0.85–1.18)
KETONES UR-MCNC: ABNORMAL
LEUKOCYTE ESTERASE UR-ACNC: ABNORMAL
MAGNESIUM SERPL-MCNC: 2.2 MG/DL — SIGNIFICANT CHANGE UP (ref 1.6–2.6)
MCHC RBC-ENTMCNC: 29.9 PG — SIGNIFICANT CHANGE UP (ref 27–34)
MCHC RBC-ENTMCNC: 29.9 PG — SIGNIFICANT CHANGE UP (ref 27–34)
MCHC RBC-ENTMCNC: 30 PG — SIGNIFICANT CHANGE UP (ref 27–34)
MCHC RBC-ENTMCNC: 34.8 GM/DL — SIGNIFICANT CHANGE UP (ref 32–36)
MCHC RBC-ENTMCNC: 34.8 GM/DL — SIGNIFICANT CHANGE UP (ref 32–36)
MCHC RBC-ENTMCNC: 35.3 GM/DL — SIGNIFICANT CHANGE UP (ref 32–36)
MCV RBC AUTO: 85 FL — SIGNIFICANT CHANGE UP (ref 80–100)
MCV RBC AUTO: 85.7 FL — SIGNIFICANT CHANGE UP (ref 80–100)
MCV RBC AUTO: 86 FL — SIGNIFICANT CHANGE UP (ref 80–100)
NITRITE UR-MCNC: POSITIVE
PH UR: 8 — SIGNIFICANT CHANGE UP (ref 5–8)
PHOSPHATE SERPL-MCNC: 3.3 MG/DL — SIGNIFICANT CHANGE UP (ref 2.5–4.5)
PLATELET # BLD AUTO: 184 K/UL — SIGNIFICANT CHANGE UP (ref 150–400)
PLATELET # BLD AUTO: 197 K/UL — SIGNIFICANT CHANGE UP (ref 150–400)
PLATELET # BLD AUTO: 218 K/UL — SIGNIFICANT CHANGE UP (ref 150–400)
POTASSIUM SERPL-MCNC: 4.2 MMOL/L — SIGNIFICANT CHANGE UP (ref 3.5–5.3)
POTASSIUM SERPL-MCNC: 4.8 MMOL/L — SIGNIFICANT CHANGE UP (ref 3.5–5.3)
POTASSIUM SERPL-SCNC: 4.2 MMOL/L — SIGNIFICANT CHANGE UP (ref 3.5–5.3)
POTASSIUM SERPL-SCNC: 4.8 MMOL/L — SIGNIFICANT CHANGE UP (ref 3.5–5.3)
PROT UR-MCNC: 100
PROTHROM AB SERPL-ACNC: 13 SEC — SIGNIFICANT CHANGE UP (ref 9.5–13)
RBC # BLD: 4.65 M/UL — SIGNIFICANT CHANGE UP (ref 4.2–5.8)
RBC # BLD: 4.73 M/UL — SIGNIFICANT CHANGE UP (ref 4.2–5.8)
RBC # BLD: 4.89 M/UL — SIGNIFICANT CHANGE UP (ref 4.2–5.8)
RBC # FLD: 11.9 % — SIGNIFICANT CHANGE UP (ref 10.3–14.5)
RBC # FLD: 12.1 % — SIGNIFICANT CHANGE UP (ref 10.3–14.5)
RBC # FLD: 12.1 % — SIGNIFICANT CHANGE UP (ref 10.3–14.5)
RBC CASTS # UR COMP ASSIST: ABNORMAL /HPF (ref 0–4)
SODIUM SERPL-SCNC: 136 MMOL/L — SIGNIFICANT CHANGE UP (ref 135–145)
SODIUM SERPL-SCNC: 139 MMOL/L — SIGNIFICANT CHANGE UP (ref 135–145)
SP GR SPEC: 1.01 — SIGNIFICANT CHANGE UP (ref 1.01–1.02)
UROBILINOGEN FLD QL: NEGATIVE — SIGNIFICANT CHANGE UP
WBC # BLD: 10.57 K/UL — HIGH (ref 3.8–10.5)
WBC # BLD: 7.91 K/UL — SIGNIFICANT CHANGE UP (ref 3.8–10.5)
WBC # BLD: 8.3 K/UL — SIGNIFICANT CHANGE UP (ref 3.8–10.5)
WBC # FLD AUTO: 10.57 K/UL — HIGH (ref 3.8–10.5)
WBC # FLD AUTO: 7.91 K/UL — SIGNIFICANT CHANGE UP (ref 3.8–10.5)
WBC # FLD AUTO: 8.3 K/UL — SIGNIFICANT CHANGE UP (ref 3.8–10.5)
WBC UR QL: SIGNIFICANT CHANGE UP /HPF (ref 0–5)

## 2023-08-10 PROCEDURE — 37188 VEN MECHNL THRMBC REPEAT TX: CPT | Mod: LT

## 2023-08-10 PROCEDURE — 76937 US GUIDE VASCULAR ACCESS: CPT | Mod: 26

## 2023-08-10 PROCEDURE — 76770 US EXAM ABDO BACK WALL COMP: CPT | Mod: 26

## 2023-08-10 PROCEDURE — 37248 TRLUML BALO ANGIOP 1ST VEIN: CPT | Mod: LT

## 2023-08-10 PROCEDURE — 99232 SBSQ HOSP IP/OBS MODERATE 35: CPT

## 2023-08-10 RX ORDER — HEPARIN SODIUM 5000 [USP'U]/ML
INJECTION INTRAVENOUS; SUBCUTANEOUS
Qty: 25000 | Refills: 0 | Status: DISCONTINUED | OUTPATIENT
Start: 2023-08-10 | End: 2023-08-10

## 2023-08-10 RX ORDER — HEPARIN SODIUM 5000 [USP'U]/ML
1200 INJECTION INTRAVENOUS; SUBCUTANEOUS
Qty: 25000 | Refills: 0 | Status: DISCONTINUED | OUTPATIENT
Start: 2023-08-10 | End: 2023-08-10

## 2023-08-10 RX ORDER — HEPARIN SODIUM 5000 [USP'U]/ML
3000 INJECTION INTRAVENOUS; SUBCUTANEOUS EVERY 6 HOURS
Refills: 0 | Status: DISCONTINUED | OUTPATIENT
Start: 2023-08-10 | End: 2023-08-10

## 2023-08-10 RX ORDER — HEPARIN SODIUM 5000 [USP'U]/ML
900 INJECTION INTRAVENOUS; SUBCUTANEOUS
Qty: 25000 | Refills: 0 | Status: DISCONTINUED | OUTPATIENT
Start: 2023-08-10 | End: 2023-08-14

## 2023-08-10 RX ORDER — HEPARIN SODIUM 5000 [USP'U]/ML
6500 INJECTION INTRAVENOUS; SUBCUTANEOUS ONCE
Refills: 0 | Status: DISCONTINUED | OUTPATIENT
Start: 2023-08-10 | End: 2023-08-10

## 2023-08-10 RX ORDER — FENTANYL CITRATE 50 UG/ML
50 INJECTION INTRAVENOUS
Refills: 0 | Status: DISCONTINUED | OUTPATIENT
Start: 2023-08-10 | End: 2023-08-10

## 2023-08-10 RX ORDER — OXYCODONE HYDROCHLORIDE 5 MG/1
10 TABLET ORAL ONCE
Refills: 0 | Status: DISCONTINUED | OUTPATIENT
Start: 2023-08-10 | End: 2023-08-10

## 2023-08-10 RX ORDER — HEPARIN SODIUM 5000 [USP'U]/ML
3000 INJECTION INTRAVENOUS; SUBCUTANEOUS EVERY 6 HOURS
Refills: 0 | Status: DISCONTINUED | OUTPATIENT
Start: 2023-08-10 | End: 2023-08-14

## 2023-08-10 RX ORDER — SODIUM CHLORIDE 9 MG/ML
1000 INJECTION, SOLUTION INTRAVENOUS ONCE
Refills: 0 | Status: COMPLETED | OUTPATIENT
Start: 2023-08-10 | End: 2023-08-10

## 2023-08-10 RX ORDER — HEPARIN SODIUM 5000 [USP'U]/ML
6500 INJECTION INTRAVENOUS; SUBCUTANEOUS EVERY 6 HOURS
Refills: 0 | Status: DISCONTINUED | OUTPATIENT
Start: 2023-08-10 | End: 2023-08-10

## 2023-08-10 RX ORDER — SODIUM CHLORIDE 9 MG/ML
1000 INJECTION, SOLUTION INTRAVENOUS
Refills: 0 | Status: DISCONTINUED | OUTPATIENT
Start: 2023-08-10 | End: 2023-08-10

## 2023-08-10 RX ORDER — OXYCODONE HYDROCHLORIDE 5 MG/1
5 TABLET ORAL ONCE
Refills: 0 | Status: DISCONTINUED | OUTPATIENT
Start: 2023-08-10 | End: 2023-08-10

## 2023-08-10 RX ORDER — SODIUM CHLORIDE 9 MG/ML
1000 INJECTION INTRAMUSCULAR; INTRAVENOUS; SUBCUTANEOUS
Refills: 0 | Status: DISCONTINUED | OUTPATIENT
Start: 2023-08-10 | End: 2023-08-13

## 2023-08-10 RX ORDER — ONDANSETRON 8 MG/1
4 TABLET, FILM COATED ORAL ONCE
Refills: 0 | Status: DISCONTINUED | OUTPATIENT
Start: 2023-08-10 | End: 2023-08-10

## 2023-08-10 RX ORDER — HEPARIN SODIUM 5000 [USP'U]/ML
6500 INJECTION INTRAVENOUS; SUBCUTANEOUS EVERY 6 HOURS
Refills: 0 | Status: DISCONTINUED | OUTPATIENT
Start: 2023-08-10 | End: 2023-08-14

## 2023-08-10 RX ADMIN — HEPARIN SODIUM 900 UNIT(S)/HR: 5000 INJECTION INTRAVENOUS; SUBCUTANEOUS at 15:04

## 2023-08-10 RX ADMIN — SODIUM CHLORIDE 100 MILLILITER(S): 9 INJECTION, SOLUTION INTRAVENOUS at 06:02

## 2023-08-10 RX ADMIN — HEPARIN SODIUM 900 UNIT(S)/HR: 5000 INJECTION INTRAVENOUS; SUBCUTANEOUS at 09:20

## 2023-08-10 RX ADMIN — HEPARIN SODIUM 900 UNIT(S)/HR: 5000 INJECTION INTRAVENOUS; SUBCUTANEOUS at 19:09

## 2023-08-10 RX ADMIN — HEPARIN SODIUM 900 UNIT(S)/HR: 5000 INJECTION INTRAVENOUS; SUBCUTANEOUS at 15:47

## 2023-08-10 RX ADMIN — HEPARIN SODIUM 900 UNIT(S)/HR: 5000 INJECTION INTRAVENOUS; SUBCUTANEOUS at 19:11

## 2023-08-10 RX ADMIN — HEPARIN SODIUM 0 UNIT(S)/HR: 5000 INJECTION INTRAVENOUS; SUBCUTANEOUS at 08:18

## 2023-08-10 RX ADMIN — SODIUM CHLORIDE 1000 MILLILITER(S): 9 INJECTION, SOLUTION INTRAVENOUS at 22:11

## 2023-08-10 RX ADMIN — SODIUM CHLORIDE 125 MILLILITER(S): 9 INJECTION INTRAMUSCULAR; INTRAVENOUS; SUBCUTANEOUS at 17:11

## 2023-08-10 RX ADMIN — ONDANSETRON 4 MILLIGRAM(S): 8 TABLET, FILM COATED ORAL at 22:11

## 2023-08-10 RX ADMIN — HEPARIN SODIUM 1200 UNIT(S)/HR: 5000 INJECTION INTRAVENOUS; SUBCUTANEOUS at 04:55

## 2023-08-10 RX ADMIN — HEPARIN SODIUM 1200 UNIT(S)/HR: 5000 INJECTION INTRAVENOUS; SUBCUTANEOUS at 07:15

## 2023-08-10 RX ADMIN — SODIUM CHLORIDE 75 MILLILITER(S): 9 INJECTION, SOLUTION INTRAVENOUS at 15:44

## 2023-08-10 NOTE — PROGRESS NOTE ADULT - ASSESSMENT
27 year old with LUE swelling.    # Acute Thrombis LUE  - Seen by Lennox once, fu outpatient for hypercoaguable  work up.  -  MRI suspicious for thoracic outlet syndrome on left side  - Continue heparin drip  - Vascular on board for possible thrombectomy, will  let us know if this can occur in house  - Monitor labs  - No family history of clots  - SP thrombectomy with recurrence of DVT  - For repeat Thrombectomy, AC as per vascular    # DVT prophylaxis  - Heparin

## 2023-08-10 NOTE — PROGRESS NOTE ADULT - SUBJECTIVE AND OBJECTIVE BOX
HOSPITALIST ATTENDING PROGRESS NOTE    Chart and meds reviewed.  Patient seen and examined.    CC: DVT    Subjective: With recurrence of LUE swelling. No fever.    All other systems reviewed and found to be negative with the exception of what has been described above.    MEDICATIONS  (STANDING):  heparin  Infusion. 1200 Unit(s)/Hr (12 mL/Hr) IV Continuous <Continuous>  lactated ringers. 1000 milliLiter(s) (100 mL/Hr) IV Continuous <Continuous>    MEDICATIONS  (PRN):  acetaminophen     Tablet .. 650 milliGRAM(s) Oral every 6 hours PRN Mild Pain (1 - 3)  aluminum hydroxide/magnesium hydroxide/simethicone Suspension 30 milliLiter(s) Oral every 4 hours PRN Dyspepsia  heparin   Injectable 3000 Unit(s) IV Push every 6 hours PRN For aPTT between 40 - 57  heparin   Injectable 6500 Unit(s) IV Push every 6 hours PRN For aPTT less than 40  melatonin 3 milliGRAM(s) Oral at bedtime PRN Insomnia  ondansetron Injectable 4 milliGRAM(s) IV Push every 8 hours PRN Nausea and/or Vomiting      VITALS:  T(F): 98.7 (08-10-23 @ 08:42), Max: 98.8 (08-09-23 @ 21:21)  HR: 69 (08-10-23 @ 08:42) (69 - 87)  BP: 118/64 (08-10-23 @ 08:42) (109/58 - 122/64)  RR: 16 (08-10-23 @ 08:42) (16 - 18)  SpO2: 99% (08-10-23 @ 08:42) (98% - 99%)  Wt(kg): --    I&O's Summary    09 Aug 2023 07:01  -  10 Aug 2023 07:00  --------------------------------------------------------  IN: 700 mL / OUT: 0 mL / NET: 700 mL        CAPILLARY BLOOD GLUCOSE          PHYSICAL EXAM:  GEN: NAD  HEENT:  pupils equal and reactive, EOMI, no oropharyngeal lesions, erythema, exudates, oral thrush  NECK:   supple, no carotid bruits, no palpable lymph nodes, no thyromegaly  CV:  +S1, +S2, regular, no murmurs or rubs  RESP:   lungs clear to auscultation bilaterally, no wheezing, rales, rhonchi, good air entry bilaterally  BREAST:  not examined  GI:  abdomen soft, non-tender, non-distended, normal BS, no bruits, no abdominal masses, no palpable masses  RECTAL:  not examined  :  not examined  MSK:   normal muscle tone, no atrophy, no rigidity, no contractions  EXT:  no clubbing, no cyanosis, no edema, no calf pain, swelling or erythema. LUE swelling with compression dressing.   VASCULAR:  pulses equal and symmetric in the upper and lower extremities  NEURO:  AAOX3, no focal neurological deficits, follows all commands, able to move extremities spontaneously  SKIN:  no ulcers, lesions or rashes    LABS:                            14.6   7.91  )-----------( 197      ( 10 Aug 2023 07:19 )             41.9     08-10    139  |  110<H>  |  12  ----------------------------<  99  4.2   |  25  |  0.97    Ca    9.0      10 Aug 2023 07:23  Phos  3.3     08-10  Mg     2.2     08-10      PT/INR - ( 09 Aug 2023 17:56 )   PT: 12.3 sec;   INR: 1.09 ratio    PTT - ( 10 Aug 2023 07:23 )  PTT:> 200 sec    Urinalysis Basic - ( 10 Aug 2023 07:23 )  Color: x / Appearance: x / SG: x / pH: x  Gluc: 99 mg/dL / Ketone: x  / Bili: x / Urobili: x   Blood: x / Protein: x / Nitrite: x   Leuk Esterase: x / RBC: x / WBC x   Sq Epi: x / Non Sq Epi: x / Bacteria: x        < from:  Duplex Venous Upper Ext Ltd, Left (08.09.23 @ 18:56) >    IMPRESSION:  Acute, occlusive deep venous thrombosis in the left subclavian and   axillary veins.  Acute, occlusive superficial venous thrombosis in the left basilic vein.  I discussed the findings with  on 08/09/2023 at 7:05 PM.  Read   back verification was obtained.    --- End of Report ---    < end of copied text >

## 2023-08-10 NOTE — BRIEF OPERATIVE NOTE - OPERATION/FINDINGS
Venogram showed clot burden from subclavian to basilic vein  Angiojet used for thrombectomy  ~230cc clot+blood removed  Post-treatment venogram showed paten vessel

## 2023-08-10 NOTE — BRIEF OPERATIVE NOTE - NSICDXBRIEFPROCEDURE_GEN_ALL_CORE_FT
PROCEDURES:  Thrombectomy of axillary or subclavian vein 09-Aug-2023 21:37:49  Ariel Zamora  
PROCEDURES:  Thrombectomy of axillary or subclavian vein 09-Aug-2023 21:37:49  Ariel Zamora

## 2023-08-10 NOTE — BRIEF OPERATIVE NOTE - NSICDXBRIEFPOSTOP_GEN_ALL_CORE_FT
POST-OP DIAGNOSIS:  Venous thoracic outlet syndrome of left subclavian vein 09-Aug-2023 21:38:46  Ariel Zamora  
POST-OP DIAGNOSIS:  Venous thoracic outlet syndrome of left subclavian vein 09-Aug-2023 21:38:46  Ariel Zamora

## 2023-08-10 NOTE — PROGRESS NOTE ADULT - SUBJECTIVE AND OBJECTIVE BOX
SURGERY DAILY PROGRESS NOTE:     Subjective:  Patient seen and examined at bedside during am rounds. No active complaints of pain or discomfort.   AVSS. Denies any fevers, chills, n/v/d, chest pain or shortness of breath    Objective:    MEDICATIONS  (STANDING):  heparin  Infusion. 1200 Unit(s)/Hr (12 mL/Hr) IV Continuous <Continuous>  lactated ringers. 1000 milliLiter(s) (100 mL/Hr) IV Continuous <Continuous>    MEDICATIONS  (PRN):  acetaminophen     Tablet .. 650 milliGRAM(s) Oral every 6 hours PRN Mild Pain (1 - 3)  aluminum hydroxide/magnesium hydroxide/simethicone Suspension 30 milliLiter(s) Oral every 4 hours PRN Dyspepsia  heparin   Injectable 3000 Unit(s) IV Push every 6 hours PRN For aPTT between 40 - 57  heparin   Injectable 6500 Unit(s) IV Push every 6 hours PRN For aPTT less than 40  melatonin 3 milliGRAM(s) Oral at bedtime PRN Insomnia  ondansetron Injectable 4 milliGRAM(s) IV Push every 8 hours PRN Nausea and/or Vomiting      Vital Signs Last 24 Hrs  T(C): 37.1 (09 Aug 2023 21:21), Max: 37.1 (09 Aug 2023 21:21)  T(F): 98.8 (09 Aug 2023 21:21), Max: 98.8 (09 Aug 2023 21:21)  HR: 74 (09 Aug 2023 21:21) (50 - 87)  BP: 109/58 (09 Aug 2023 21:21) (103/61 - 122/64)  BP(mean): 78 (09 Aug 2023 16:54) (78 - 78)  RR: 18 (09 Aug 2023 21:21) (12 - 18)  SpO2: 99% (09 Aug 2023 21:21) (98% - 100%)    Parameters below as of 09 Aug 2023 21:21  Patient On (Oxygen Delivery Method): room air          PHYSICAL EXAM   Gen: well-appearing, in no acute distress  CV: pulse regularly present   Resp: airway patent, non-labored breathing  Extremities: LUE covered with ace wrap from hand to axilla, non tender, radial pulse palpable      I&O's Detail    09 Aug 2023 07:01  -  10 Aug 2023 04:47  --------------------------------------------------------  IN:    Lactated Ringers: 100 mL    Other (mL): 600 mL  Total IN: 700 mL    OUT:  Total OUT: 0 mL    Total NET: 700 mL           LABS:                        14.2   8.30  )-----------( 218      ( 10 Aug 2023 01:41 )             40.2     08-09    142  |  111<H>  |  16  ----------------------------<  93  4.0   |  25  |  0.89    Ca    8.8      09 Aug 2023 04:00  Phos  4.3     08-09  Mg     2.2     08-09      PT/INR - ( 09 Aug 2023 17:56 )   PT: 12.3 sec;   INR: 1.09 ratio         PTT - ( 10 Aug 2023 01:41 )  PTT:116.7 sec  Urinalysis Basic - ( 09 Aug 2023 04:00 )    Color: x / Appearance: x / SG: x / pH: x  Gluc: 93 mg/dL / Ketone: x  / Bili: x / Urobili: x   Blood: x / Protein: x / Nitrite: x   Leuk Esterase: x / RBC: x / WBC x   Sq Epi: x / Non Sq Epi: x / Bacteria: x

## 2023-08-10 NOTE — PROGRESS NOTE ADULT - ASSESSMENT
27yoM presenting with LUE Axillary vein and subclavian vein DVT  Thoracic outlet syndrome   s/p LUE thrombectomy on 8/9   Post op course complicated by recurrence of DVT in the axillary subclavian and basilic veins in the immediate postop   Patient scheduled for a re-do thrombectomy today    Plan:  preop labs at 5 am  Continue AC with hep drip, trend to therapeutic levels   Continue Arm elevation and ACE wrap  Medical management as per primary team    Plan discussed with Dr. Kim

## 2023-08-10 NOTE — PROVIDER CONTACT NOTE (OTHER) - ASSESSMENT
Unable to scan bag at rate of 12ml/hr. New order placed again for heparin gtt @ 12ml/hr. Pt has been receiving heparin gtt @12ml/hr since aPTT resulted after 1:15 lab draw. Since now bag scan is extremely late, will place start aPTT order for 7am (6 hours after last draw)

## 2023-08-10 NOTE — PROVIDER CONTACT NOTE (OTHER) - BACKGROUND
"heparin gtt must be reordered r/t a weight change" spoke w/ MD Choudhary, MD Choudhary placed a new order for a heparin gtt @14ml/hr with special instructions to start @12ml/hr. Pharm took over 2 hours to send

## 2023-08-11 PROBLEM — Z00.00 ENCOUNTER FOR PREVENTIVE HEALTH EXAMINATION: Status: ACTIVE | Noted: 2023-08-11

## 2023-08-11 LAB
ANION GAP SERPL CALC-SCNC: 5 MMOL/L — SIGNIFICANT CHANGE UP (ref 5–17)
APPEARANCE UR: CLEAR — SIGNIFICANT CHANGE UP
APTT BLD: 49.1 SEC — HIGH (ref 24.5–35.6)
APTT BLD: 50.3 SEC — HIGH (ref 24.5–35.6)
APTT BLD: 58.4 SEC — HIGH (ref 24.5–35.6)
BACTERIA # UR AUTO: NEGATIVE — SIGNIFICANT CHANGE UP
BASOPHILS # BLD AUTO: 0.01 K/UL — SIGNIFICANT CHANGE UP (ref 0–0.2)
BASOPHILS NFR BLD AUTO: 0.1 % — SIGNIFICANT CHANGE UP (ref 0–2)
BILIRUB UR-MCNC: NEGATIVE — SIGNIFICANT CHANGE UP
BUN SERPL-MCNC: 30 MG/DL — HIGH (ref 7–23)
CALCIUM SERPL-MCNC: 9.2 MG/DL — SIGNIFICANT CHANGE UP (ref 8.5–10.1)
CHLORIDE SERPL-SCNC: 111 MMOL/L — HIGH (ref 96–108)
CO2 SERPL-SCNC: 24 MMOL/L — SIGNIFICANT CHANGE UP (ref 22–31)
COLOR SPEC: YELLOW — SIGNIFICANT CHANGE UP
CREAT ?TM UR-MCNC: 18 MG/DL — SIGNIFICANT CHANGE UP
CREAT SERPL-MCNC: 2.66 MG/DL — HIGH (ref 0.5–1.3)
CULTURE RESULTS: NO GROWTH — SIGNIFICANT CHANGE UP
DIFF PNL FLD: ABNORMAL
EGFR: 33 ML/MIN/1.73M2 — LOW
EOSINOPHIL # BLD AUTO: 0 K/UL — SIGNIFICANT CHANGE UP (ref 0–0.5)
EOSINOPHIL NFR BLD AUTO: 0 % — SIGNIFICANT CHANGE UP (ref 0–6)
EPI CELLS # UR: NEGATIVE — SIGNIFICANT CHANGE UP
GLUCOSE SERPL-MCNC: 129 MG/DL — HIGH (ref 70–99)
GLUCOSE UR QL: NEGATIVE — SIGNIFICANT CHANGE UP
HCT VFR BLD CALC: 40 % — SIGNIFICANT CHANGE UP (ref 39–50)
HGB BLD-MCNC: 13.8 G/DL — SIGNIFICANT CHANGE UP (ref 13–17)
IMM GRANULOCYTES NFR BLD AUTO: 0.6 % — SIGNIFICANT CHANGE UP (ref 0–0.9)
KETONES UR-MCNC: NEGATIVE — SIGNIFICANT CHANGE UP
LEUKOCYTE ESTERASE UR-ACNC: NEGATIVE — SIGNIFICANT CHANGE UP
LYMPHOCYTES # BLD AUTO: 0.73 K/UL — LOW (ref 1–3.3)
LYMPHOCYTES # BLD AUTO: 4.3 % — LOW (ref 13–44)
MAGNESIUM SERPL-MCNC: 2.4 MG/DL — SIGNIFICANT CHANGE UP (ref 1.6–2.6)
MCHC RBC-ENTMCNC: 29.4 PG — SIGNIFICANT CHANGE UP (ref 27–34)
MCHC RBC-ENTMCNC: 34.5 GM/DL — SIGNIFICANT CHANGE UP (ref 32–36)
MCV RBC AUTO: 85.3 FL — SIGNIFICANT CHANGE UP (ref 80–100)
MONOCYTES # BLD AUTO: 1.4 K/UL — HIGH (ref 0–0.9)
MONOCYTES NFR BLD AUTO: 8.2 % — SIGNIFICANT CHANGE UP (ref 2–14)
NEUTROPHILS # BLD AUTO: 14.85 K/UL — HIGH (ref 1.8–7.4)
NEUTROPHILS NFR BLD AUTO: 86.8 % — HIGH (ref 43–77)
NITRITE UR-MCNC: NEGATIVE — SIGNIFICANT CHANGE UP
PH UR: 7 — SIGNIFICANT CHANGE UP (ref 5–8)
PHOSPHATE SERPL-MCNC: 3.9 MG/DL — SIGNIFICANT CHANGE UP (ref 2.5–4.5)
PLATELET # BLD AUTO: 191 K/UL — SIGNIFICANT CHANGE UP (ref 150–400)
POTASSIUM SERPL-MCNC: 4.5 MMOL/L — SIGNIFICANT CHANGE UP (ref 3.5–5.3)
POTASSIUM SERPL-SCNC: 4.5 MMOL/L — SIGNIFICANT CHANGE UP (ref 3.5–5.3)
PROT ?TM UR-MCNC: 11 MG/DL — SIGNIFICANT CHANGE UP (ref 0–12)
PROT UR-MCNC: 15
PROT/CREAT UR-RTO: 0.6 RATIO — HIGH (ref 0–0.2)
RBC # BLD: 4.69 M/UL — SIGNIFICANT CHANGE UP (ref 4.2–5.8)
RBC # FLD: 11.8 % — SIGNIFICANT CHANGE UP (ref 10.3–14.5)
RBC CASTS # UR COMP ASSIST: SIGNIFICANT CHANGE UP /HPF (ref 0–4)
SODIUM SERPL-SCNC: 140 MMOL/L — SIGNIFICANT CHANGE UP (ref 135–145)
SP GR SPEC: 1.01 — SIGNIFICANT CHANGE UP (ref 1.01–1.02)
SPECIMEN SOURCE: SIGNIFICANT CHANGE UP
UROBILINOGEN FLD QL: NEGATIVE — SIGNIFICANT CHANGE UP
WBC # BLD: 17.09 K/UL — HIGH (ref 3.8–10.5)
WBC # FLD AUTO: 17.09 K/UL — HIGH (ref 3.8–10.5)
WBC UR QL: SIGNIFICANT CHANGE UP /HPF (ref 0–5)

## 2023-08-11 PROCEDURE — 99231 SBSQ HOSP IP/OBS SF/LOW 25: CPT

## 2023-08-11 PROCEDURE — 93971 EXTREMITY STUDY: CPT | Mod: 26,LT

## 2023-08-11 PROCEDURE — 99232 SBSQ HOSP IP/OBS MODERATE 35: CPT

## 2023-08-11 RX ORDER — CEFTRIAXONE 500 MG/1
1000 INJECTION, POWDER, FOR SOLUTION INTRAMUSCULAR; INTRAVENOUS EVERY 24 HOURS
Refills: 0 | Status: COMPLETED | OUTPATIENT
Start: 2023-08-11 | End: 2023-08-13

## 2023-08-11 RX ORDER — SODIUM CHLORIDE 9 MG/ML
1000 INJECTION INTRAMUSCULAR; INTRAVENOUS; SUBCUTANEOUS ONCE
Refills: 0 | Status: COMPLETED | OUTPATIENT
Start: 2023-08-11 | End: 2023-08-11

## 2023-08-11 RX ORDER — CEFTRIAXONE 500 MG/1
1000 INJECTION, POWDER, FOR SOLUTION INTRAMUSCULAR; INTRAVENOUS EVERY 24 HOURS
Refills: 0 | Status: DISCONTINUED | OUTPATIENT
Start: 2023-08-11 | End: 2023-08-11

## 2023-08-11 RX ORDER — SENNA PLUS 8.6 MG/1
2 TABLET ORAL AT BEDTIME
Refills: 0 | Status: DISCONTINUED | OUTPATIENT
Start: 2023-08-11 | End: 2023-08-20

## 2023-08-11 RX ADMIN — HEPARIN SODIUM 1300 UNIT(S)/HR: 5000 INJECTION INTRAVENOUS; SUBCUTANEOUS at 18:03

## 2023-08-11 RX ADMIN — HEPARIN SODIUM 900 UNIT(S)/HR: 5000 INJECTION INTRAVENOUS; SUBCUTANEOUS at 07:32

## 2023-08-11 RX ADMIN — SODIUM CHLORIDE 125 MILLILITER(S): 9 INJECTION INTRAMUSCULAR; INTRAVENOUS; SUBCUTANEOUS at 02:53

## 2023-08-11 RX ADMIN — HEPARIN SODIUM 1100 UNIT(S)/HR: 5000 INJECTION INTRAVENOUS; SUBCUTANEOUS at 11:41

## 2023-08-11 RX ADMIN — HEPARIN SODIUM 1300 UNIT(S)/HR: 5000 INJECTION INTRAVENOUS; SUBCUTANEOUS at 20:29

## 2023-08-11 RX ADMIN — HEPARIN SODIUM 900 UNIT(S)/HR: 5000 INJECTION INTRAVENOUS; SUBCUTANEOUS at 01:41

## 2023-08-11 RX ADMIN — HEPARIN SODIUM 1100 UNIT(S)/HR: 5000 INJECTION INTRAVENOUS; SUBCUTANEOUS at 08:56

## 2023-08-11 RX ADMIN — SODIUM CHLORIDE 1000 MILLILITER(S): 9 INJECTION INTRAMUSCULAR; INTRAVENOUS; SUBCUTANEOUS at 16:20

## 2023-08-11 RX ADMIN — Medication 3 MILLIGRAM(S): at 22:46

## 2023-08-11 RX ADMIN — SODIUM CHLORIDE 125 MILLILITER(S): 9 INJECTION INTRAMUSCULAR; INTRAVENOUS; SUBCUTANEOUS at 11:40

## 2023-08-11 RX ADMIN — CEFTRIAXONE 1000 MILLIGRAM(S): 500 INJECTION, POWDER, FOR SOLUTION INTRAMUSCULAR; INTRAVENOUS at 17:27

## 2023-08-11 RX ADMIN — ONDANSETRON 4 MILLIGRAM(S): 8 TABLET, FILM COATED ORAL at 13:58

## 2023-08-11 RX ADMIN — HEPARIN SODIUM 3000 UNIT(S): 5000 INJECTION INTRAVENOUS; SUBCUTANEOUS at 08:57

## 2023-08-11 RX ADMIN — HEPARIN SODIUM 3000 UNIT(S): 5000 INJECTION INTRAVENOUS; SUBCUTANEOUS at 18:04

## 2023-08-11 RX ADMIN — SENNA PLUS 2 TABLET(S): 8.6 TABLET ORAL at 22:46

## 2023-08-11 RX ADMIN — SODIUM CHLORIDE 125 MILLILITER(S): 9 INJECTION INTRAMUSCULAR; INTRAVENOUS; SUBCUTANEOUS at 16:31

## 2023-08-11 NOTE — CONSULT NOTE ADULT - ASSESSMENT
Problem List:  1) LUE DVT  2) CORNELIO  3) thoracic outlet obstruction     Heparin gtt for A/C  IVF for CORNELIO and to washout contrast. CORNELIO most likely from contrast induced nephropathy from venograms  Pain control  miguel a op abx  Thoracic following for 1st rib removal next week  plan discussed with Attending Dr. Gamble

## 2023-08-11 NOTE — CONSULT NOTE ADULT - REASON FOR ADMISSION
Acute Left Upper Ext DVT

## 2023-08-11 NOTE — CONSULT NOTE ADULT - ASSESSMENT
27y Male  presenting with LUE Axillary vein and subclavian vein DVT with Thoracic outlet syndrome. s/p LUE thrombectomy on 8/9 in IR. Post op course complicated by recurrence of DVT in the axillary subclavian and basilic veins in the immediate postop period. S/p angiojet thrombectomy of LUE 8/10. Now on heparin gtt. Called for evaluation for 1st rib resection.  Pt seen, states swelling much improved. Denies SOB/CP. 27y Male  presenting with LUE Axillary vein and subclavian vein DVT with Thoracic outlet syndrome. s/p LUE thrombectomy on 8/9 in IR. Post op course complicated by recurrence of DVT in the axillary subclavian and basilic veins in the immediate postop period. S/p angiojet thrombectomy of LUE 8/10. Now on heparin gtt. Called for evaluation for 1st rib resection.    First rib resection scheduled for Thursday, 8/17  cont full dose anticoagulation  d/w Dr. Harris

## 2023-08-11 NOTE — HISTORY OF PRESENT ILLNESS
[FreeTextEntry1] : Mr. CARDOZO is a 27 year old male referred by the emergency department at [facility] who presents for consultation. His past medical history is non contributory. He had presented to the hospital on August 6th with left upper extremity swelling without trauma. CTA was negative for pathology however ultrasound revealed left axillary venous thrombus. He had consultation with Hematology during that visit as well with labs tested for clotting factors and gene mutation. He was discharged home with Eliquis and Vascular Surgery follow up for possible thrombectomy.

## 2023-08-11 NOTE — CONSULT NOTE ADULT - ASSESSMENT
27 yo male with no past medical hx presenting with acute LUE swelling found with acute subclavian clot and possible thoracic outlet syndrome    CORNELIO  - mutlifactorial    Heme pigment CORNELIO  +/- contrast nephropathy    post large clot thrombolysis load and multiple contrast loads    IVF aggressive hydration to promote  /hour - NS 1 liter bolus given , continue 125/     urine myoglobin, urine heme pigment   renal vein doppler r/o RVT to rule out clot migration / emboli    avoid or minimize further contrast load   if further clot lysis required will need aggressive hydration    trend Cr levels after IVF daily    strict I/o   no NSAID     d/w pt and family at bedside ( Christen LORENZ RN )   d/w 1 N RN at length     Thank you for the courtesy of this consult. We will follow this patient with you.   Management is subject to change if new information becomes available or patient condition changes.                 27 yo male with no past medical hx presenting with acute LUE swelling found with acute subclavian clot and possible thoracic outlet syndrome    CORNELIO  - mutlifactorial    Heme pigment CORNELIO  +/- contrast nephropathy    post large clot thrombolysis load and multiple contrast loads    IVF aggressive hydration to promote  - 200 /hour - NS 1 liter bolus given , continue 125/     urine myoglobin, urine heme pigment   renal vein doppler r/o RVT to rule out clot migration / emboli    avoid or minimize further contrast load   if further clot lysis required will need aggressive hydration    trend Cr levels after IVF daily    strict I/o   no NSAID     d/w pt and family at bedside ( Christen LORENZ RN )   d/w 1 N RN at length     Thank you for the courtesy of this consult. We will follow this patient with you.   Management is subject to change if new information becomes available or patient condition changes.

## 2023-08-11 NOTE — CONSULT NOTE ADULT - SUBJECTIVE AND OBJECTIVE BOX
27 year old with no past medical hx of clots presenting w acute LUE swelling found w  Acute Thrombis LUE and MRI suspicious for thoracic outlet syndrome   pt required acute clot thrombolysis w high clot load ~ 280 cc of clot , pt states she was urinating dark after procedure . pt states he is now urinating clear urine.  Denies any flank pains. pt also had multiple contrast load  with CTA chest and  venograms           PAST MEDICAL & SURGICAL HISTORY:  No pertinent past medical history    No significant past surgical history    Home Medications:   none       MEDICATIONS  (STANDING):  cefTRIAXone Injectable. 1000 milliGRAM(s) IV Push every 24 hours  heparin  Infusion. 900 Unit(s)/Hr (9 mL/Hr) IV Continuous <Continuous>  senna 2 Tablet(s) Oral at bedtime  sodium chloride 0.9%. 1000 milliLiter(s) (125 mL/Hr) IV Continuous <Continuous>      Allergies    No Known Allergies    Intolerances        SOCIAL HISTORY:  Denies ETOh,Smoking,     FAMILY HISTORY:  No pertinent family history in first degree relatives        REVIEW OF SYSTEMS:    CONSTITUTIONAL: No weakness, fevers or chills  EYES/ENT: No visual changes;  No vertigo or throat pain   NECK: No pain or stiffness  RESPIRATORY: No cough, wheezing, hemoptysis; No shortness of breath  CARDIOVASCULAR: No chest pain or palpitations  GASTROINTESTINAL: No abdominal or epigastric pain. No nausea, vomiting, or hematemesis; No diarrhea or constipation. No melena or hematochezia.  GENITOURINARY: No dysuria, frequency or hematuria  NEUROLOGICAL: No numbness or weakness  SKIN: No itching, burning, rashes, or lesions   All other review of systems is negative unless indicated above.    VITAL:  T(C): , Max: 37.1 (08-11-23 @ 09:19)  T(F): , Max: 98.8 (08-11-23 @ 09:19)  HR: 72 (08-11-23 @ 16:10)  BP: 135/70 (08-11-23 @ 16:10)  BP(mean): 63 (08-11-23 @ 09:19)  RR: 18 (08-11-23 @ 16:10)  SpO2: 100% (08-11-23 @ 16:10)  Wt(kg): --    I and O's:    08-10 @ 07:01  -  08-11 @ 07:00  --------------------------------------------------------  IN: 1000 mL / OUT: 0 mL / NET: 1000 mL          PHYSICAL EXAM:    Constitutional: NAD  HEENT: PERRLA, EOMI,  MMM  Neck: No LAD, No JVD  Respiratory: CTAB  Cardiovascular: S1 and S2  Gastrointestinal: BS+, soft, NT/ND, no cva tenderness   Extremities: No peripheral edema, LUE edema mild , move all ext   Neurological: A/O x 3, no focal deficits  : No Damico  Skin: No rashes  Access: Not applicable    LABS:                                   13.8   17.09 )-----------( 191      ( 11 Aug 2023 07:21 )             40.0                         13.9   10.57 )-----------( 184      ( 10 Aug 2023 17:08 )             40.0     140    |  111    |  30     ----------------------------<  129       11 Aug 2023 07:21  4.5     |  24     |  2.66     136    |  106    |  19     ----------------------------<  142       10 Aug 2023 18:16  4.8     |  26     |  1.75     139    |  110    |  12     ----------------------------<  99        10 Aug 2023 07:23  4.2     |  25     |  0.97     Ca    9.2        11 Aug 2023 07:21  Ca    9.1        10 Aug 2023 18:16    Phos  3.9       11 Aug 2023 07:21  Phos  3.3       10 Aug 2023 07:23    Mg     2.4       11 Aug 2023 07:21  Mg     2.2       10 Aug 2023 07:23        Urine Microscopic-Add On (NC) (08.10.23 @ 17:46)   Red Blood Cell - Urine: 6-10 /HPF  White Blood Cell - Urine: 0-2 /HPF  Bacteria: Moderate  Comment - Urine: moderate hyphae-like cells seen  Squamous Epithelial Cells: Negative    Urinalysis (08.10.23 @ 17:46)   Glucose Qualitative, Urine: Negative  Blood, Urine: Large  pH Urine: 8.0  Color: Brown  Urine Appearance: Clear  Bilirubin: Negative  Ketone - Urine: Trace  Specific Gravity: 1.010  Protein, Urine: 100  Urobilinogen: Negative  Nitrite: Positive  Leukocyte Esterase Concentration: Trace          RADIOLOGY & ADDITIONAL STUDIES:                    
History of Present Illness:  27y Male  presenting with LUE Axillary vein and subclavian vein DVT with Thoracic outlet syndrome. s/p LUE thrombectomy on 8/9 in IR. Post op course complicated by recurrence of DVT in the axillary subclavian and basilic veins in the immediate postop period. S/p angiojet thrombectomy of LUE 8/10. Now on heparin gtt. Called for evaluation for 1st rib resection.  Pt seen, states swelling much improved. Denies SOB/CP.    PMH/PSH:  No pertinent past medical history      No significant past surgical history        Relevant Family History  FAMILY HISTORY:  No pertinent family history in first degree relatives        SOCIAL HISTORY:  Smoker: [ ] Yes  x[ ] No        PACK YEARS:                         WHEN QUIT?  ETOH use: [ ] Yes  [x ] No              FREQUENCY / QUANTITY:  Ilicit Drug use:  [ ] Yes  [x ] No  Occupation:  Live with: parents      MEDICATIONS  (STANDING):  heparin  Infusion. 900 Unit(s)/Hr (9 mL/Hr) IV Continuous <Continuous>  sodium chloride 0.9%. 1000 milliLiter(s) (125 mL/Hr) IV Continuous <Continuous>    MEDICATIONS  (PRN):  acetaminophen     Tablet .. 650 milliGRAM(s) Oral every 6 hours PRN Mild Pain (1 - 3)  aluminum hydroxide/magnesium hydroxide/simethicone Suspension 30 milliLiter(s) Oral every 4 hours PRN Dyspepsia  heparin   Injectable 6500 Unit(s) IV Push every 6 hours PRN For aPTT less than 40  heparin   Injectable 3000 Unit(s) IV Push every 6 hours PRN For aPTT between 40 - 57  melatonin 3 milliGRAM(s) Oral at bedtime PRN Insomnia  ondansetron Injectable 4 milliGRAM(s) IV Push every 8 hours PRN Nausea and/or Vomiting      Allergies: No Known Allergies                                                            LABS:                        13.8   17.09 )-----------( 191      ( 11 Aug 2023 07:21 )             40.0     08-11    140  |  111<H>  |  30<H>  ----------------------------<  129<H>  4.5   |  24  |  2.66<H>    Ca    9.2      11 Aug 2023 07:21  Phos  3.9     08-11  Mg     2.4     08-11      PT/INR - ( 10 Aug 2023 18:16 )   PT: 13.0 sec;   INR: 1.16 ratio         PTT - ( 11 Aug 2023 07:55 )  PTT:50.3 sec  Urinalysis Basic - ( 11 Aug 2023 07:21 )    Color: x / Appearance: x / SG: x / pH: x  Gluc: 129 mg/dL / Ketone: x  / Bili: x / Urobili: x   Blood: x / Protein: x / Nitrite: x   Leuk Esterase: x / RBC: x / WBC x   Sq Epi: x / Non Sq Epi: x / Bacteria: x    < from: MR Chest w/wo IV Cont (08.07.23 @ 16:04) >  FINDINGS:    ARTERIES: The subclavian arteries are not well seen secondary to   suboptimal bolus timing.    VEINS: There is a mild physiologic narrowing of the subclavian veins   within the costoclavicular space with the arms abducted, which resolves   with arm adduction, a normal finding.  There is segmental acute   thrombosis of the left subclavian and axillary vein, which terminates   centrally at the costoclavicular space between the first rib and clavicle.    MUSCULOSKELETAL STRUCTURES: There is no evidence of cervical rib or   elongated C7 transverse process. No exuberant bony callus formation. No   mass lesions are identified in the interscalene triangle, costoclavicular   space, or retropectoralis minor spaces bilaterally.    OTHER: The visualized lung fields show no focal signal abnormality. No   pleural abnormality. Normal heart size. No pericardial effusion. Normal   caliber thoracic aorta. Widely patent arch branch vessels with normal   branching pattern. Central venous structures are patent. Central   pulmonary arteries are patent. No mediastinal, hilar, or axillary   lymphadenopathy. No chest wall abnormality or aggressive osseous lesion.    3D image post processing was helpful in evaluating the vascular anatomy,   supporting the findings stated above.    IMPRESSION:    Segmental acute thrombosis of the left subclavian and axillary vein,   which terminates centrally at the costoclavicular space between the first   rib and clavicle. Findings are suspicious for a left-sided thoracic   outlet compression.    No discrete anatomic abnormality is identified.    < end of copied text >    < from: CT Angio Chest PE Protocol w/ IV Cont (08.06.23 @ 11:03) >  FINDINGS:    LUNGS AND AIRWAYS: Patent central airways.  Small subpleural blebs at the   lung apices. There is a 5 mm left lower lobe lung nodule.  PLEURA: No pleural effusion.  MEDIASTINUM AND QUYNH: No lymphadenopathy.  VESSELS: Limited evaluation of the segmental and subsegmental pulmonary   arterial branches. No evidence of pulmonary embolus. The superior vena   cava is patent. The left brachiocephalic and internal jugular veins are   patent. The right brachiocephalic and internal jugular veins are patent.   The subclavian vein and axillary vein are not well visualized bilaterally   in part due to limited opacification and in part due to artifact in this   region.  HEART: Heart size is normal. No pericardial effusion.  CHEST WALL AND LOWER NECK: 1.2 cm mildly enlarged left axillary lymph   node.  VISUALIZED UPPER ABDOMEN: Subcentimeter hypodensity right kidney, too   small to characterize  BONES: Within normal limits.    IMPRESSION:  No CT evidence of pulmonary embolus.    Patent superior vena cava and brachiocephalic vein bilaterally.    Limited evaluation of the subclavian and axillary vein bilaterally on   venous phase imaging. If clinically warranted, further evaluation may be   performed with dynamic MRI of the chest with dedicated thoracic outlet   syndrome protocol. Alternatively, repeat CT imaging could be performed   with dedicated thoracic outlet syndrome protocol.    5 mm  left lower lobe lung nodule.    < end of copied text >              Review of Systems         As per HPI    T(C): 37.1 (08-11-23 @ 09:19), Max: 37.1 (08-11-23 @ 09:19)  HR: 87 (08-11-23 @ 09:19) (49 - 87)  BP: 114/51 (08-11-23 @ 09:19) (104/64 - 121/80)  ABP: --  ABP(mean): --  RR: 18 (08-11-23 @ 09:19) (16 - 19)  SpO2: 99% (08-11-23 @ 09:19) (98% - 100%)      Physical Exam  General: NAD                                                         Neuro: A+O x 3, non-focal, speech clear and intact                     Eyes: PERRL, EOMI   ENT: Normal exam of nasal/oral mucosa with absence of cyanosis.   Neck: supple, no JVD, trachea midline   Chest: CTA, equal bilaterally,  normal excursion, no accessory muscle use note  CV: RRR,   GI: soft, NT, ND,   Extremities: warm, LLE mild swelling of hand  SKIN: warm, dry, intact   
28 yo Male presented with complain of  left arm swelling and pain with purple discoloration that started yesterday. No fever or chills, No ho trauma. No recent travel. No cough. No shortness of breath. No recent trauma. Patient reports he is a  and wears a vest most of the daay while on duty. He also reports body building and weightlifting exercises 4-5x per week.    Vitals:  T(C): 36.7 (08-06 @ 15:56), Max: 36.8 (08-05 @ 23:07)  HR: 65 (08-06 @ 15:56) (57 - 69)  BP: 112/55 (08-06 @ 15:56) (112/55 - 127/54)  RR: 18 (08-06 @ 15:56) (17 - 18)  SpO2: 99% (08-06 @ 15:56) (99% - 100%)      Physical Exam:  General: AAOx3, Well developed, NAD  Chest: Normal respiratory effort  Heart: RRR  Abdomen: Soft, NTND, no masses  Neuro/Psych: No localized deficits. Normal speech, normal tone  Skin: Normal, no rashes, no lesions noted.   Extremities: Warm, well perfused, no edema, Pulses intact    08-06 @ 07:35                    13.7  CBC: 11.72>)-------(<232                     40.3                 - | - | -    CMP:  ----------------------< -               - | - | -                      Ca:-  Phos:-  Mg:-               -|      |-        LFTs:  ------|-|-----             -|      |-  08-05 @ 23:39                    15.0  CBC: 11.75>)-------(<259                     43.0                 141 | 107 | 22    CMP:  ----------------------< 122               4.1 | 28 | 1.15                      Ca:9.6  Phos:-  Mg:-               0.6|      |29        LFTs:  ------|74|-----             -|      |-      Current Inpatient Medications:  acetaminophen     Tablet .. 650 milliGRAM(s) Oral every 6 hours PRN  aluminum hydroxide/magnesium hydroxide/simethicone Suspension 30 milliLiter(s) Oral every 4 hours PRN  heparin   Injectable 6500 Unit(s) IV Push every 6 hours PRN  heparin   Injectable 3000 Unit(s) IV Push every 6 hours PRN  heparin  Infusion.  Unit(s)/Hr (14 mL/Hr) IV Continuous <Continuous>  melatonin 3 milliGRAM(s) Oral at bedtime PRN  ondansetron Injectable 4 milliGRAM(s) IV Push every 8 hours PRN      < from: US Duplex Venous Upper Ext Ltd, Left (08.06.23 @ 00:29) >    ACC: [id number] EXAM:  US DPLX UPR EXT VEINS LTD LT   ORDERED BY: LEEANNA WOODS     PROCEDURE DATE:  08/06/2023          INTERPRETATION:  CLINICAL INFORMATION: Left upper extremity swelling.    COMPARISON: None available.    TECHNIQUE: Duplex sonography of the UPPER extremity veins with color and   spectral Doppler, with and without compression.    FINDINGS:    There is nonocclusive thrombus in the left axillary vein. The left   internal jugular, subclavian,  and brachial veins are patent and   compressible where applicable.  The basilic vein (superficial vein) is   patent and without thrombus.  The cephalic vein (superficial vein) is   patent and without thrombus.    Doppler examination shows normal spontaneous and phasic flow.    IMPRESSION:  Occlusive deep vein thrombosis involving the left axillary vein.    Findings were discussed by Dr. Nieto with Dr. Bustillo with read back at   12:35 AM.    --- End of Report ---            DAPHNE NIETO MD; Attending Radiologist  This document has been electronically signed. Aug  6 2023 12:36AM    < end of copied text >  
 27-year-old male in usual state of excellent health   4 days ago noted pain in left shoulder subsequent swelling in left upper extremity.   progressively worsened   admitted to [facility] and duplex Doppler reveals occlusive deep vein thrombosis left axillary vein.     patient works as a [place]    he is in excellent health   acknowledges he exercises frequently lifting weights up to 4 times a week     denies recent trauma   medications none     past medical history none of significance, questionable left testicular varicocele        no prior history of DVT VTE   no family history of vascular events     ROS   feels great appetite excellent weight stable   Denies fevers night sweats inappropriate weight loss joint aches or pains swelling rashes dry eyes dry mouth     physical examination well-developed well-nourished 27-year-old male no acute distress   HEENT NC/AT   neck supple no palpable lymphadenopathy   lungs clear   heart S1-S2   abdomen soft nontender no organomegaly noted   lymph nodes nonpalpable neck axillary or inguinal areas   extremities   significant left upper extremity edema down to hand   skin warm dry without ecchymosis purpura petechiae or rashes    WBC Count : 11.72 K/uL  RBC Count : 4.66 M/uL  Hemoglobin : 13.7 g/dL  Hematocrit : 40.3 %  Platelet Count - Automated : 232 K/uL  Mean Cell Volume : 86.5 fl  Mean Cell Hemoglobin : 29.4 pg  Mean Cell Hemoglobin Concentration : 34.0 gm/dL    08-05    141  |  107  |  22  ----------------------------<  122<H>  4.1   |  28  |  1.15    Ca    9.6      05 Aug 2023 23:39    TPro  8.4<H>  /  Alb  4.4  /  TBili  0.6  /  DBili  x   /  AST  29  /  ALT  23  /  AlkPhos  74  08-05    PT/INR - ( 05 Aug 2023 23:39 )   PT: 12.7 sec;   INR: 1.13 ratio      Vital Signs Last 24 Hrs  T(C): 36.7 (06 Aug 2023 15:56), Max: 36.8 (05 Aug 2023 23:07)  T(F): 98.1 (06 Aug 2023 15:56), Max: 98.2 (05 Aug 2023 23:07)  HR: 65 (06 Aug 2023 15:56) (57 - 69)  BP: 112/55 (06 Aug 2023 15:56) (112/55 - 127/54)  BP(mean): 67 (06 Aug 2023 15:56) (67 - 95)  RR: 18 (06 Aug 2023 15:56) (17 - 18)  SpO2: 99% (06 Aug 2023 15:56) (99% - 100%)    < from: US Duplex Venous Upper Ext Ltd, Left (08.06.23 @ 00:29) >    US DPLX UPR EXT VEINS LTD LT   ORDERED BY: LEEANNA WOODS     PROCEDURE DATE:  08/06/2023          INTERPRETATION:  CLINICAL INFORMATION: Left upper extremity swelling.    COMPARISON: None available.    TECHNIQUE: Duplex sonography of the UPPER extremity veins with color and   spectral Doppler, with and without compression.    FINDINGS:    There is nonocclusive thrombus in the left axillary vein. The left   internal jugular, subclavian,  and brachial veins are patent and   compressible where applicable.  The basilic vein (superficial vein) is   patent and without thrombus.  The cephalic vein (superficial vein) is   patent and without thrombus.    Doppler examination shows normal spontaneous and phasic flow.    IMPRESSION:  Occlusive deep vein thrombosis involving the left axillary vein.    Findings were discussed by Dr. Escobar with Dr. Bustillo with read back at   12:35 AM.    --- End of Report ---         Angio Chest PE Protocol w/ IV Cont (08.06.23 @ 11:03) >   CT ANGIO CHEST PULM ART [facility]   ORDERED BY: SARAH MAHAN     PROCEDURE DATE:  08/06/2023      INTERPRETATION:  CLINICAL INFORMATION: Left upper extremity DVT. Evaluate   for pulmonary embolus. Evaluate for venous thrombosis in the chest or   axilla. Suspect thoracic outlet syndrome.    COMPARISON: Left upper extremity venous duplex dated 8/5/2023    CONTRAST/COMPLICATIONS:  IV Contrast: Omnipaque 350  90 cc administered   10 cc discarded  Oral Contrast: NONE  Complications: None reported at time of study completion    PROCEDURE:  CT Angiography of the Chest.  Sagittal and coronal reformats were performed as well as 3D (MIP)   reconstructions.  In addition, venous phase imaging was performed through the chest.    FINDINGS:    LUNGS AND AIRWAYS: Patent central airways.  Small subpleural blebs at the   lung apices. There is a 5 mm left lower lobe lung nodule.  PLEURA: No pleural effusion.  MEDIASTINUM AND QUYNH: No lymphadenopathy.  VESSELS: Limited evaluation of the segmental and subsegmental pulmonary   arterial branches. No evidence of pulmonary embolus. The superior vena   cava is patent. The left brachiocephalic and internal jugular veins are   patent. The right brachiocephalic and internal jugular veins are patent.   The subclavian vein and axillary vein are not well visualized bilaterally   in part due to limited opacification and in part due to artifact in this   region.  HEART: Heart size is normal. No pericardial effusion.  CHEST WALL AND LOWER NECK: 1.2 cm mildly enlarged left axillary lymph   node.  VISUALIZED UPPER ABDOMEN: Subcentimeter hypodensity right kidney, too   small to characterize  BONES: Within normal limits.    IMPRESSION:  No CT evidence of pulmonary embolus.    Patent superior vena cava and brachiocephalic vein bilaterally.    Limited evaluation of the subclavian and axillary vein bilaterally on   venous phase imaging. If clinically warranted, further evaluation may be   performed with dynamic MRI of the chest with dedicated thoracic outlet   syndrome protocol. Alternatively, repeat CT imaging could be performed   with dedicated thoracic outlet syndrome protocol.    5 mm  left lower lobe lung nodule.    DELORIS VAUGHN MD; Attending Radiologist    
Patient is a 27y old  Male who presents with a chief complaint of Acute Left Upper Ext DVT (11 Aug 2023 19:05)      BRIEF HOSPITAL COURSE: *26y/o M with no sig PMH presents with LUE swelling. Found to have clot in left subclavian vein and underwent thrombectomy x2. Now iwth Sav from contrast load.     Events last 24 hours: patient seen and examined at the bedside. he is lying in bed, feels better. Still with some LUE swelling but overall improved. making clear urine.     PAST MEDICAL & SURGICAL HISTORY:  No pertinent past medical history      No significant past surgical history          Review of Systems:  CONSTITUTIONAL: No fever, chills, or fatigue  EYES: No eye pain, visual disturbances, or discharge  ENMT:  No difficulty hearing, tinnitus, vertigo; No sinus or throat pain  NECK: No pain or stiffness  RESPIRATORY: No cough, wheezing, chills or hemoptysis; No shortness of breath  CARDIOVASCULAR: No chest pain, palpitations, dizziness, or leg swelling  GASTROINTESTINAL: No abdominal or epigastric pain. No nausea, vomiting, or hematemesis; No diarrhea or constipation. No melena or hematochezia.  GENITOURINARY: No dysuria, frequency, hematuria, or incontinence  NEUROLOGICAL: No headaches, memory loss, loss of strength, numbness, or tremors  SKIN: No itching, burning, rashes, or lesions   MUSCULOSKELETAL: No joint pain or swelling; No muscle, back, or extremity pain  PSYCHIATRIC: No depression, anxiety, mood swings, or difficulty sleeping      Medications:  cefTRIAXone Injectable. 1000 milliGRAM(s) IV Push every 24 hours        acetaminophen     Tablet .. 650 milliGRAM(s) Oral every 6 hours PRN  melatonin 3 milliGRAM(s) Oral at bedtime PRN  ondansetron Injectable 4 milliGRAM(s) IV Push every 8 hours PRN      heparin   Injectable 6500 Unit(s) IV Push every 6 hours PRN  heparin   Injectable 3000 Unit(s) IV Push every 6 hours PRN  heparin  Infusion. 900 Unit(s)/Hr IV Continuous <Continuous>    aluminum hydroxide/magnesium hydroxide/simethicone Suspension 30 milliLiter(s) Oral every 4 hours PRN  senna 2 Tablet(s) Oral at bedtime        sodium chloride 0.9%. 1000 milliLiter(s) IV Continuous <Continuous>                ICU Vital Signs Last 24 Hrs  T(C): 36.8 (11 Aug 2023 20:51), Max: 37.1 (11 Aug 2023 09:19)  T(F): 98.2 (11 Aug 2023 20:51), Max: 98.8 (11 Aug 2023 09:19)  HR: 68 (11 Aug 2023 20:00) (68 - 87)  BP: 115/70 (11 Aug 2023 20:00) (114/51 - 135/70)  BP(mean): 84 (11 Aug 2023 20:) (63 - 84)  ABP: --  ABP(mean): --  RR: 18 (11 Aug 2023 20:) (18 - 18)  SpO2: 99% (11 Aug 2023 20:) (99% - 100%)    O2 Parameters below as of 11 Aug 2023 20:00  Patient On (Oxygen Delivery Method): room air                I&O's Detail    10 Aug 2023 07:01  -  11 Aug 2023 07:00  --------------------------------------------------------  IN:    Lactated Ringers: 200 mL    Other (mL): 800 mL  Total IN: 1000 mL    OUT:  Total OUT: 0 mL    Total NET: 1000 mL      11 Aug 2023 07:01  -  11 Aug 2023 22:36  --------------------------------------------------------  IN:  Total IN: 0 mL    OUT:    Voided (mL): 400 mL  Total OUT: 400 mL    Total NET: -400 mL            LABS:                        13.8   17.09 )-----------( 191      ( 11 Aug 2023 07:21 )             40.0     08-11    140  |  111<H>  |  30<H>  ----------------------------<  129<H>  4.5   |  24  |  2.66<H>    Ca    9.2      11 Aug 2023 07:21  Phos  3.9     0811  Mg     2.4     08            CAPILLARY BLOOD GLUCOSE        PT/INR - ( 10 Aug 2023 18:16 )   PT: 13.0 sec;   INR: 1.16 ratio         PTT - ( 11 Aug 2023 17:20 )  PTT:49.1 sec  Urinalysis Basic - ( 11 Aug 2023 20:10 )    Color: Yellow / Appearance: Clear / S.010 / pH: x  Gluc: x / Ketone: Negative  / Bili: Negative / Urobili: Negative   Blood: x / Protein: 15 / Nitrite: Negative   Leuk Esterase: Negative / RBC: 0-5 /HPF / WBC 0-2 /HPF   Sq Epi: x / Non Sq Epi: x / Bacteria: Negative      CULTURES:  Culture Results:   No growth (08-10-23 @ 17:46)      Physical Examination:    General:  Alert, oriented, interactive, nonfocal    HEENT: Pupils equal, reactive to light.  Symmetric.    PULM: Clear to auscultation bilaterally, no significant sputum production    CVS: Regular rate and rhythm, no murmurs, rubs, or gallops    ABD: Soft, nondistended, nontender, normoactive bowel sounds, no masses    EXT: LUE with swelling, no  tender to palpation    SKIN: Warm     NEURO: A&Ox3, strength 5/5 all extremities, cranial nerves grossly intact, no focal deficits      RADIOLOGY: ACC: [id number] EXAM:  US DPLX UPR EXT VEINS LTD LT   ORDERED BY: SOUTH BAINS     PROCEDURE DATE:  2023          INTERPRETATION:  CLINICAL INFORMATION: Follow-up DVT, status post   thrombectomy of left upper extremity.    COMPARISON: Left upper extremity venous duplex ultrasound dated 2023.    TECHNIQUE: Duplex sonography of the LEFT UPPER extremity veins with color   and spectral Doppler, with and without compression.    FINDINGS:    Left internal jugular vein demonstrates phasic flow.    Medial left subclavian vein is partially imaged due to rib/clavicle   shadowing artifact, however demonstrates prominent thrombus, image   1.7700. Patent portions of the mid and lateral left subclavian vein   demonstrate decreased phasicity suggestive of central venous clot.   Recommend chest CT with IV contrast for further evaluation.    Partially occlusive clot of lateral left subclavian vein and axillary   vein is markedly improved since 2023. Portion of left axillary vein   clot is slightly pedunculated, image [id number]. Left brachial, radial, and   ulnar veins are patent. These findings are confirmed on Doppler.    Left basilic vein, a superficial vein, also demonstrates partial   compressibility and nonocclusive clot. Left cephalic vein is patent.    Contralateral right internal jugular vein and right subclavian vein   demonstrate phasic flow.    IMPRESSION:    Medial left subclavian vein is partially imaged due to rib/clavicle   shadowing artifact, however demonstrates prominent thrombus, image   1.2164. Patent portions of the left mid and lateral subclavian vein   demonstrate decreased phasicity suggestive of central venous clot.   Consider chest CT with IV contrast for further evaluation.    Partially occlusive clot oflateral left subclavian vein and axillary   vein is markedly improved since 2023. Partially occlusive left   basilic superficial vein clot noted as well.    --- End of Report ---      PENG CERDA M.D., ATTENDING RADIOLOGIST  This documenthas been electronically signed. Aug 11 2023 12:23PM      " Time spent on this patient encounter, which includes documenting this note in the electronic medical record, was 38 minutes including assessing the presenting problems with associated risks, reviewing the medical record to prepare for the encounter, and meeting face to face with patient to obtain additional history. I have also performed an appropriate physical exam, made interventions listed and ordered and interpreted appropriate diagnostic studies as documented. To improve communication and patient saftey, I have coordinated care with the multidisciplinary team including the bedside nurse, appropriate attending of record and consultants as needed. "

## 2023-08-11 NOTE — PROGRESS NOTE ADULT - ASSESSMENT
27 year old with LUE swelling.    # Acute Thrombis LUE  - Seen by Lennox once, fu outpatient for hypercoaguable  work up.  -  MRI suspicious for thoracic outlet syndrome on left side  - Continue heparin drip  - Vascular on board for possible thrombectomy, will  let us know if this can occur in house  - Monitor labs  - No family history of clots  - SP thrombectomy with recurrence of DVT  -Repeat thrombectomy, see above US    # CORNELIO  - following angiojet  - Continue IVF    # UTI  - Rocephin x 3 days    # DVT prophylaxis  - Heparin

## 2023-08-11 NOTE — PROGRESS NOTE ADULT - SUBJECTIVE AND OBJECTIVE BOX
SURGERY DAILY PROGRESS NOTE:     Subjective:  Patient seen and examined at bedside during am rounds. Reports pressure and swelling in the arm similar to postop but denies pain.   Arm wrapped in ace wrap.   AVSS. Denies any fevers, chills, n/v/d, chest pain or shortness of breath    Objective:      Vital Signs Last 24 Hrs  T(C): 36.7 (10 Aug 2023 22:02), Max: 37.1 (10 Aug 2023 08:42)  T(F): 98 (10 Aug 2023 22:02), Max: 98.7 (10 Aug 2023 08:42)  HR: 50 (10 Aug 2023 22:02) (49 - 69)  BP: 115/56 (10 Aug 2023 22:02) (104/64 - 121/80)  BP(mean): 69 (10 Aug 2023 22:02) (69 - 73)  RR: 16 (10 Aug 2023 22:02) (16 - 19)  SpO2: 98% (10 Aug 2023 22:02) (98% - 100%)    Parameters below as of 10 Aug 2023 22:02  Patient On (Oxygen Delivery Method): room air      PHYSICAL EXAM   Gen: well-appearing, in no acute distress  CV: pulse regularly present   Resp: airway patent, non-labored breathing  Extremities: left hand swollen, with mild erythema compared to the right, non tender, capillary refil <2 sec, full ROM intact         LABS: pending

## 2023-08-11 NOTE — PROGRESS NOTE ADULT - ASSESSMENT
27yoM presenting with LUE Axillary vein and subclavian vein DVT  Thoracic outlet syndrome   s/p LUE thrombectomy on 8/9   Post op course complicated by recurrence of DVT in the axillary subclavian and basilic veins in the immediate postop   s/p angiojet thrombectomy of LUE 8/10  CORNLEIO postop with black urine, clearing up now  Recieved 1 L bolus overnight   PTT therapeutic x 1    Plan:    Continue AC with hep drip, trend to therapeutic levels   Continue Arm elevation and ACE wrap  US duplex today of the LUE   1st rib resection on 8/17 by cardiothoracic surgery   Medical management as per primary team    Plan discussed with Dr. Kim

## 2023-08-11 NOTE — PROGRESS NOTE ADULT - SUBJECTIVE AND OBJECTIVE BOX
HOSPITALIST ATTENDING PROGRESS NOTE    Chart and meds reviewed.  Patient seen and examined.    CC: DVT    Subjective: Still with some hematuria    All other systems reviewed and found to be negative with the exception of what has been described above.    MEDICATIONS  (STANDING):  heparin  Infusion. 900 Unit(s)/Hr (9 mL/Hr) IV Continuous <Continuous>  sodium chloride 0.9% Bolus 1000 milliLiter(s) IV Bolus once  sodium chloride 0.9%. 1000 milliLiter(s) (125 mL/Hr) IV Continuous <Continuous>    MEDICATIONS  (PRN):  acetaminophen     Tablet .. 650 milliGRAM(s) Oral every 6 hours PRN Mild Pain (1 - 3)  aluminum hydroxide/magnesium hydroxide/simethicone Suspension 30 milliLiter(s) Oral every 4 hours PRN Dyspepsia  heparin   Injectable 6500 Unit(s) IV Push every 6 hours PRN For aPTT less than 40  heparin   Injectable 3000 Unit(s) IV Push every 6 hours PRN For aPTT between 40 - 57  melatonin 3 milliGRAM(s) Oral at bedtime PRN Insomnia  ondansetron Injectable 4 milliGRAM(s) IV Push every 8 hours PRN Nausea and/or Vomiting      VITALS:  T(F): 98.8 (08-11-23 @ 09:19), Max: 98.8 (08-11-23 @ 09:19)  HR: 87 (08-11-23 @ 09:19) (50 - 87)  BP: 114/51 (08-11-23 @ 09:19) (114/51 - 115/62)  RR: 18 (08-11-23 @ 09:19) (16 - 18)  SpO2: 99% (08-11-23 @ 09:19) (98% - 99%)  Wt(kg): --    I&O's Summary    10 Aug 2023 07:01  -  11 Aug 2023 07:00  --------------------------------------------------------  IN: 1000 mL / OUT: 0 mL / NET: 1000 mL      PHYSICAL EXAM:  GEN: NAD  HEENT:  pupils equal and reactive, EOMI, no oropharyngeal lesions, erythema, exudates, oral thrush  NECK:   supple, no carotid bruits, no palpable lymph nodes, no thyromegaly  CV:  +S1, +S2, regular, no murmurs or rubs  RESP:   lungs clear to auscultation bilaterally, no wheezing, rales, rhonchi, good air entry bilaterally  BREAST:  not examined  GI:  abdomen soft, non-tender, non-distended, normal BS, no bruits, no abdominal masses, no palpable masses  RECTAL:  not examined  :  not examined  MSK:   normal muscle tone, no atrophy, no rigidity, no contractions  EXT:  Left UE swelling  VASCULAR:  pulses equal and symmetric in the upper and lower extremities  NEURO:  AAOX3, no focal neurological deficits, follows all commands, able to move extremities spontaneously  SKIN:  no ulcers, lesions or rashes    LABS:                            13.8   17.09 )-----------( 191      ( 11 Aug 2023 07:21 )             40.0     08-11    140  |  111<H>  |  30<H>  ----------------------------<  129<H>  4.5   |  24  |  2.66<H>    Ca    9.2      11 Aug 2023 07:21  Phos  3.9     08-11  Mg     2.4     08-11      PT/INR - ( 10 Aug 2023 18:16 )   PT: 13.0 sec;   INR: 1.16 ratio    PTT - ( 11 Aug 2023 07:55 )  PTT:50.3 sec    Urinalysis Basic - ( 11 Aug 2023 07:21 )  Color: x / Appearance: x / SG: x / pH: x  Gluc: 129 mg/dL / Ketone: x  / Bili: x / Urobili: x   Blood: x / Protein: x / Nitrite: x   Leuk Esterase: x / RBC: x / WBC x   Sq Epi: x / Non Sq Epi: x / Bacteria: x      < from: US Duplex Venous Upper Ext Ltd, Left (08.11.23 @ 10:53) >  IMPRESSION:    Medial left subclavian vein is partially imaged due to rib/clavicle   shadowing artifact, however demonstrates prominent thrombus, image   1.2814. Patent portions of the left mid and lateral subclavian vein   demonstrate decreased phasicity suggestive of central venous clot.   Consider chest CT with IV contrast for further evaluation.    Partially occlusive clot oflateral left subclavian vein and axillary   vein is markedly improved since 8/9/2023. Partially occlusive left   basilic superficial vein clot noted as well.    --- End of Report ---

## 2023-08-12 LAB
ALBUMIN SERPL ELPH-MCNC: 3.2 G/DL — LOW (ref 3.3–5)
ALP SERPL-CCNC: 50 U/L — SIGNIFICANT CHANGE UP (ref 40–120)
ALT FLD-CCNC: 103 U/L — HIGH (ref 12–78)
ANION GAP SERPL CALC-SCNC: 3 MMOL/L — LOW (ref 5–17)
ANION GAP SERPL CALC-SCNC: 4 MMOL/L — LOW (ref 5–17)
ANION GAP SERPL CALC-SCNC: 6 MMOL/L — SIGNIFICANT CHANGE UP (ref 5–17)
APTT BLD: 77.6 SEC — HIGH (ref 24.5–35.6)
APTT BLD: 80.8 SEC — HIGH (ref 24.5–35.6)
APTT BLD: 91.7 SEC — HIGH (ref 24.5–35.6)
AST SERPL-CCNC: 95 U/L — HIGH (ref 15–37)
BASOPHILS # BLD AUTO: 0.01 K/UL — SIGNIFICANT CHANGE UP (ref 0–0.2)
BASOPHILS NFR BLD AUTO: 0.1 % — SIGNIFICANT CHANGE UP (ref 0–2)
BILIRUB SERPL-MCNC: 0.3 MG/DL — SIGNIFICANT CHANGE UP (ref 0.2–1.2)
BUN SERPL-MCNC: 32 MG/DL — HIGH (ref 7–23)
BUN SERPL-MCNC: 34 MG/DL — HIGH (ref 7–23)
BUN SERPL-MCNC: 36 MG/DL — HIGH (ref 7–23)
CALCIUM SERPL-MCNC: 7.8 MG/DL — LOW (ref 8.5–10.1)
CALCIUM SERPL-MCNC: 8.5 MG/DL — SIGNIFICANT CHANGE UP (ref 8.5–10.1)
CALCIUM SERPL-MCNC: 8.5 MG/DL — SIGNIFICANT CHANGE UP (ref 8.5–10.1)
CHLORIDE SERPL-SCNC: 112 MMOL/L — HIGH (ref 96–108)
CHLORIDE SERPL-SCNC: 113 MMOL/L — HIGH (ref 96–108)
CHLORIDE SERPL-SCNC: 118 MMOL/L — HIGH (ref 96–108)
CK SERPL-CCNC: 68 U/L — SIGNIFICANT CHANGE UP (ref 26–308)
CK SERPL-CCNC: 88 U/L — SIGNIFICANT CHANGE UP (ref 26–308)
CO2 SERPL-SCNC: 24 MMOL/L — SIGNIFICANT CHANGE UP (ref 22–31)
CO2 SERPL-SCNC: 25 MMOL/L — SIGNIFICANT CHANGE UP (ref 22–31)
CO2 SERPL-SCNC: 25 MMOL/L — SIGNIFICANT CHANGE UP (ref 22–31)
CREAT SERPL-MCNC: 3.29 MG/DL — HIGH (ref 0.5–1.3)
CREAT SERPL-MCNC: 3.32 MG/DL — HIGH (ref 0.5–1.3)
CREAT SERPL-MCNC: 3.45 MG/DL — HIGH (ref 0.5–1.3)
EGFR: 24 ML/MIN/1.73M2 — LOW
EGFR: 25 ML/MIN/1.73M2 — LOW
EGFR: 26 ML/MIN/1.73M2 — LOW
EOSINOPHIL # BLD AUTO: 0.02 K/UL — SIGNIFICANT CHANGE UP (ref 0–0.5)
EOSINOPHIL NFR BLD AUTO: 0.2 % — SIGNIFICANT CHANGE UP (ref 0–6)
GLUCOSE SERPL-MCNC: 106 MG/DL — HIGH (ref 70–99)
GLUCOSE SERPL-MCNC: 85 MG/DL — SIGNIFICANT CHANGE UP (ref 70–99)
GLUCOSE SERPL-MCNC: 95 MG/DL — SIGNIFICANT CHANGE UP (ref 70–99)
HCT VFR BLD CALC: 35.2 % — LOW (ref 39–50)
HGB BLD-MCNC: 12 G/DL — LOW (ref 13–17)
IMM GRANULOCYTES NFR BLD AUTO: 0.4 % — SIGNIFICANT CHANGE UP (ref 0–0.9)
LYMPHOCYTES # BLD AUTO: 1.32 K/UL — SIGNIFICANT CHANGE UP (ref 1–3.3)
LYMPHOCYTES # BLD AUTO: 13.7 % — SIGNIFICANT CHANGE UP (ref 13–44)
MAGNESIUM SERPL-MCNC: 2.3 MG/DL — SIGNIFICANT CHANGE UP (ref 1.6–2.6)
MCHC RBC-ENTMCNC: 29.9 PG — SIGNIFICANT CHANGE UP (ref 27–34)
MCHC RBC-ENTMCNC: 34.1 GM/DL — SIGNIFICANT CHANGE UP (ref 32–36)
MCV RBC AUTO: 87.6 FL — SIGNIFICANT CHANGE UP (ref 80–100)
MONOCYTES # BLD AUTO: 1.13 K/UL — HIGH (ref 0–0.9)
MONOCYTES NFR BLD AUTO: 11.7 % — SIGNIFICANT CHANGE UP (ref 2–14)
NEUTROPHILS # BLD AUTO: 7.13 K/UL — SIGNIFICANT CHANGE UP (ref 1.8–7.4)
NEUTROPHILS NFR BLD AUTO: 73.9 % — SIGNIFICANT CHANGE UP (ref 43–77)
PHOSPHATE SERPL-MCNC: 4.2 MG/DL — SIGNIFICANT CHANGE UP (ref 2.5–4.5)
PLATELET # BLD AUTO: 157 K/UL — SIGNIFICANT CHANGE UP (ref 150–400)
POTASSIUM SERPL-MCNC: 3.8 MMOL/L — SIGNIFICANT CHANGE UP (ref 3.5–5.3)
POTASSIUM SERPL-MCNC: 4.2 MMOL/L — SIGNIFICANT CHANGE UP (ref 3.5–5.3)
POTASSIUM SERPL-MCNC: 4.2 MMOL/L — SIGNIFICANT CHANGE UP (ref 3.5–5.3)
POTASSIUM SERPL-SCNC: 3.8 MMOL/L — SIGNIFICANT CHANGE UP (ref 3.5–5.3)
POTASSIUM SERPL-SCNC: 4.2 MMOL/L — SIGNIFICANT CHANGE UP (ref 3.5–5.3)
POTASSIUM SERPL-SCNC: 4.2 MMOL/L — SIGNIFICANT CHANGE UP (ref 3.5–5.3)
PROT SERPL-MCNC: 6.2 GM/DL — SIGNIFICANT CHANGE UP (ref 6–8.3)
RBC # BLD: 4.02 M/UL — LOW (ref 4.2–5.8)
RBC # FLD: 12.2 % — SIGNIFICANT CHANGE UP (ref 10.3–14.5)
SODIUM SERPL-SCNC: 141 MMOL/L — SIGNIFICANT CHANGE UP (ref 135–145)
SODIUM SERPL-SCNC: 143 MMOL/L — SIGNIFICANT CHANGE UP (ref 135–145)
SODIUM SERPL-SCNC: 146 MMOL/L — HIGH (ref 135–145)
SODIUM UR-SCNC: 31 MMOL/L — SIGNIFICANT CHANGE UP
WBC # BLD: 9.65 K/UL — SIGNIFICANT CHANGE UP (ref 3.8–10.5)
WBC # FLD AUTO: 9.65 K/UL — SIGNIFICANT CHANGE UP (ref 3.8–10.5)

## 2023-08-12 PROCEDURE — 99233 SBSQ HOSP IP/OBS HIGH 50: CPT

## 2023-08-12 PROCEDURE — 99232 SBSQ HOSP IP/OBS MODERATE 35: CPT

## 2023-08-12 PROCEDURE — 93975 VASCULAR STUDY: CPT | Mod: 26

## 2023-08-12 RX ORDER — ACETAMINOPHEN 500 MG
975 TABLET ORAL EVERY 8 HOURS
Refills: 0 | Status: DISCONTINUED | OUTPATIENT
Start: 2023-08-12 | End: 2023-08-20

## 2023-08-12 RX ORDER — OXYCODONE HYDROCHLORIDE 5 MG/1
5 TABLET ORAL EVERY 4 HOURS
Refills: 0 | Status: DISCONTINUED | OUTPATIENT
Start: 2023-08-12 | End: 2023-08-14

## 2023-08-12 RX ADMIN — CEFTRIAXONE 1000 MILLIGRAM(S): 500 INJECTION, POWDER, FOR SOLUTION INTRAMUSCULAR; INTRAVENOUS at 17:33

## 2023-08-12 RX ADMIN — ONDANSETRON 4 MILLIGRAM(S): 8 TABLET, FILM COATED ORAL at 06:35

## 2023-08-12 RX ADMIN — SODIUM CHLORIDE 125 MILLILITER(S): 9 INJECTION INTRAMUSCULAR; INTRAVENOUS; SUBCUTANEOUS at 00:47

## 2023-08-12 RX ADMIN — ONDANSETRON 4 MILLIGRAM(S): 8 TABLET, FILM COATED ORAL at 15:34

## 2023-08-12 RX ADMIN — SODIUM CHLORIDE 175 MILLILITER(S): 9 INJECTION INTRAMUSCULAR; INTRAVENOUS; SUBCUTANEOUS at 13:07

## 2023-08-12 RX ADMIN — HEPARIN SODIUM 1300 UNIT(S)/HR: 5000 INJECTION INTRAVENOUS; SUBCUTANEOUS at 07:52

## 2023-08-12 RX ADMIN — Medication 975 MILLIGRAM(S): at 19:35

## 2023-08-12 RX ADMIN — Medication 650 MILLIGRAM(S): at 06:35

## 2023-08-12 RX ADMIN — Medication 975 MILLIGRAM(S): at 19:08

## 2023-08-12 RX ADMIN — SODIUM CHLORIDE 125 MILLILITER(S): 9 INJECTION INTRAMUSCULAR; INTRAVENOUS; SUBCUTANEOUS at 05:42

## 2023-08-12 RX ADMIN — HEPARIN SODIUM 1300 UNIT(S)/HR: 5000 INJECTION INTRAVENOUS; SUBCUTANEOUS at 07:24

## 2023-08-12 RX ADMIN — HEPARIN SODIUM 1300 UNIT(S)/HR: 5000 INJECTION INTRAVENOUS; SUBCUTANEOUS at 00:29

## 2023-08-12 RX ADMIN — HEPARIN SODIUM 1300 UNIT(S)/HR: 5000 INJECTION INTRAVENOUS; SUBCUTANEOUS at 19:56

## 2023-08-12 RX ADMIN — SENNA PLUS 2 TABLET(S): 8.6 TABLET ORAL at 22:45

## 2023-08-12 RX ADMIN — Medication 650 MILLIGRAM(S): at 07:05

## 2023-08-12 NOTE — PROGRESS NOTE ADULT - ASSESSMENT
27y Male  presenting with LUE Axillary vein and subclavian vein DVT with Thoracic outlet syndrome. s/p LUE thrombectomy on 8/9 in IR. Post op course complicated by recurrence of DVT in the axillary subclavian and basilic veins in the immediate postop period. S/p angiojet thrombectomy of LUE 8/10. Now on heparin gtt. Called for evaluation for 1st rib resection.      Imp/Plan    1) Thoracic outlet syndrome with recurrent LUE DVT: Now on FD Heparin infusion. Awaiting 1st rib resection this week. Developed CORNELIO, likely related to thrombolysis and/OR IV Contrst.  Cont IV Heparin. Timing of surgery will be based on renal recovery. Creat appears to be plateauing now. WIll continue Aggressive hydration. Avoid and nephrotoxic agents. F/U BUN/Creat. F/U Renal ultrasound results to exclude any intrinsic/Obstructive pathology.  As per Nephrology    2) CORNELIO: as noted above        LAZARO Lozoya

## 2023-08-12 NOTE — PROGRESS NOTE ADULT - ASSESSMENT
25 yo male with no past medical hx presenting with acute LUE swelling found with acute subclavian clot and possible thoracic outlet syndrome    CORNELIO  - mutlifactorial    Heme pigment CORNELIO  +/- contrast nephropathy    post large clot thrombolysis load and multiple contrast loads    IVF aggressive hydration to promote  - 200 /hour - NS 1 liter bolus given , continue 125/     urine myoglobin, urine heme pigment   renal vein doppler r/o RVT to rule out clot migration / emboli    avoid or minimize further contrast load   if further clot lysis required will need aggressive hydration    trend Cr levels after IVF daily    strict I/o   no NSAID     d/w pt and family at bedside ( Christen LORENZ RN )   d/w 1 N RN at length     Thank you for the courtesy of this consult. We will follow this patient with you.   Management is subject to change if new information becomes available or patient condition changes.    8/12  CORNELIO  L arm DVT  On heparin   creat plateauing

## 2023-08-12 NOTE — PROGRESS NOTE ADULT - ASSESSMENT
Problem List:  1) LUE DVT  2) CORNELIO  3) thoracic outlet obstruction     Heparin gtt for A/C  IVF for CORNELIO and to washout contrast. CORNELIO most likely from contrast induced nephropathy from venograms  Pain control  miguel a op abx  Thoracic following for 1st rib removal next week either tu or Th

## 2023-08-12 NOTE — PROVIDER CONTACT NOTE (OTHER) - SITUATION
pts heparin gtt started @ 12ml/hr overnight. pts aPTT came back @ 116.7. while following the nomogram the patients gtt should have been continued @ 12ml/hr. Upon scanning, drug would not scan stating
Office closed.  Please fax discharge papers to [phone].

## 2023-08-12 NOTE — PROGRESS NOTE ADULT - SUBJECTIVE AND OBJECTIVE BOX
27 year old with no past medical hx of clots presenting w acute LUE swelling found w  Acute Thrombis LUE and MRI suspicious for thoracic outlet syndrome   pt required acute clot thrombolysis w high clot load ~ 280 cc of clot , pt states she was urinating dark after procedure . pt states he is now urinating clear urine.  Denies any flank pains. pt also had multiple contrast load  with CTA chest and  venograms     8/12  Seen at bedside  discussed w pts parents      PAST MEDICAL & SURGICAL HISTORY:  No pertinent past medical history    No significant past surgical history    Home Medications:   none       MEDICATIONS  (STANDING):  cefTRIAXone Injectable. 1000 milliGRAM(s) IV Push every 24 hours  heparin  Infusion. 900 Unit(s)/Hr (9 mL/Hr) IV Continuous <Continuous>  senna 2 Tablet(s) Oral at bedtime  sodium chloride 0.9%. 1000 milliLiter(s) (175 mL/Hr) IV Continuous <Continuous>    MEDICATIONS  (PRN):  acetaminophen     Tablet .. 975 milliGRAM(s) Oral every 8 hours PRN Temp greater or equal to 38C (100.4F), Mild Pain (1 - 3)  aluminum hydroxide/magnesium hydroxide/simethicone Suspension 30 milliLiter(s) Oral every 4 hours PRN Dyspepsia  heparin   Injectable 6500 Unit(s) IV Push every 6 hours PRN For aPTT less than 40  heparin   Injectable 3000 Unit(s) IV Push every 6 hours PRN For aPTT between 40 - 57  melatonin 3 milliGRAM(s) Oral at bedtime PRN Insomnia  ondansetron Injectable 4 milliGRAM(s) IV Push every 8 hours PRN Nausea and/or Vomiting  oxyCODONE    IR 5 milliGRAM(s) Oral every 4 hours PRN Moderate Pain (4 - 6)    Allergies    No Known Allergies    Intolerances        SOCIAL HISTORY:  Denies ETOh,Smoking,     FAMILY HISTORY:  No pertinent family history in first degree relatives      ICU Vital Signs Last 24 Hrs  T(C): 36.9 (12 Aug 2023 14:39), Max: 36.9 (11 Aug 2023 19:28)  T(F): 98.5 (12 Aug 2023 14:39), Max: 98.5 (11 Aug 2023 19:28)  HR: 67 (12 Aug 2023 16:00) (55 - 77)  BP: 109/62 (12 Aug 2023 16:00) (107/64 - 121/73)  BP(mean): 77 (12 Aug 2023 16:00) (77 - 85)  ABP: --  ABP(mean): --  RR: 11 (12 Aug 2023 16:00) (8 - 24)  SpO2: 100% (12 Aug 2023 16:00) (97% - 100%)    O2 Parameters below as of 12 Aug 2023 08:00  Patient On (Oxygen Delivery Method): room air      REVIEW OF SYSTEMS:    CONSTITUTIONAL: No weakness, fevers or chills  EYES/ENT: No visual changes;  No vertigo or throat pain   NECK: No pain or stiffness  RESPIRATORY: No cough, wheezing, hemoptysis; No shortness of breath  CARDIOVASCULAR: No chest pain or palpitations  GASTROINTESTINAL: No abdominal or epigastric pain. No nausea, vomiting, or hematemesis; No diarrhea or constipation. No melena or hematochezia.  GENITOURINARY: No dysuria, frequency or hematuria  NEUROLOGICAL: No numbness or weakness  SKIN: No itching, burning, rashes, or lesions   All other review of systems is negative unless indicated above.                PHYSICAL EXAM:    Constitutional: NAD  HEENT: PERRLA, EOMI,  MMM  Neck: No LAD, No JVD  Respiratory: CTAB  Cardiovascular: S1 and S2  Gastrointestinal: BS+, soft, NT/ND, no cva tenderness   Extremities: No peripheral edema, LUE edema mild , move all ext   Neurological: A/O x 3, no focal deficits  : No Damico  Skin: No rashes  Access: Not applicable    LABS:                          12.0   9.65  )-----------( 157      ( 12 Aug 2023 06:24 )             35.2                                      13.8   17.09 )-----------( 191      ( 11 Aug 2023 07:21 )             40.0                         13.9   10.57 )-----------( 184      ( 10 Aug 2023 17:08 )             40.0     08-12    143  |  112<H>  |  34<H>  ----------------------------<  95  4.2   |  25  |  3.45<H>    Ca    8.5      12 Aug 2023 06:24  Phos  4.2     08-12  Mg     2.3     08-12    TPro  6.2  /  Alb  3.2<L>  /  TBili  0.3  /  DBili  x   /  AST  95<H>  /  ALT  103<H>  /  AlkPhos  50  08-12      140    |  111    |  30     ----------------------------<  129       11 Aug 2023 07:21  4.5     |  24     |  2.66     136    |  106    |  19     ----------------------------<  142       10 Aug 2023 18:16  4.8     |  26     |  1.75     139    |  110    |  12     ----------------------------<  99        10 Aug 2023 07:23  4.2     |  25     |  0.97     Ca    9.2        11 Aug 2023 07:21  Ca    9.1        10 Aug 2023 18:16    Phos  3.9       11 Aug 2023 07:21  Phos  3.3       10 Aug 2023 07:23    Mg     2.4       11 Aug 2023 07:21  Mg     2.2       10 Aug 2023 07:23        Urine Microscopic-Add On (NC) (08.10.23 @ 17:46)   Red Blood Cell - Urine: 6-10 /HPF  White Blood Cell - Urine: 0-2 /HPF  Bacteria: Moderate  Comment - Urine: moderate hyphae-like cells seen  Squamous Epithelial Cells: Negative    Urinalysis (08.10.23 @ 17:46)   Glucose Qualitative, Urine: Negative  Blood, Urine: Large  pH Urine: 8.0  Color: Brown  Urine Appearance: Clear  Bilirubin: Negative  Ketone - Urine: Trace  Specific Gravity: 1.010  Protein, Urine: 100  Urobilinogen: Negative  Nitrite: Positive  Leukocyte Esterase Concentration: Trace          RADIOLOGY & ADDITIONAL STUDIES:

## 2023-08-12 NOTE — PROGRESS NOTE ADULT - SUBJECTIVE AND OBJECTIVE BOX
Brief Hospital Course:  27y Male  presenting with LUE Axillary vein and subclavian vein DVT with Thoracic outlet syndrome. s/p LUE thrombectomy on 8/9 in IR. Post op course complicated by recurrence of DVT in the axillary subclavian and basilic veins in the immediate postop period. S/p angiojet thrombectomy of LUE 8/10. Now on heparin gtt. Called for evaluation for 1st rib resection.      Awake and comfortable resting in bed now.         Vital Signs:  Vital Signs Last 24 Hrs  T(C): 36.9 (08-12-23 @ 09:53), Max: 36.9 (08-11-23 @ 19:28)  T(F): 98.5 (08-12-23 @ 09:53), Max: 98.5 (08-11-23 @ 19:28)  HR: 60 (08-12-23 @ 10:00) (55 - 72)  BP: 107/64 (08-12-23 @ 10:00) (107/64 - 135/70)  RR: 15 (08-12-23 @ 10:00) (12 - 24)  SpO2: 100% (08-12-23 @ 10:00) (97% - 100%) on (O2)    Telemetry/Alarms:    Relevant labs, radiology and Medications reviewed                        12.0   9.65  )-----------( 157      ( 12 Aug 2023 06:24 )             35.2     08-12    143  |  112<H>  |  34<H>  ----------------------------<  95  4.2   |  25  |  3.45<H>    Ca    8.5      12 Aug 2023 06:24  Phos  4.2     08-12  Mg     2.3     08-12    TPro  6.2  /  Alb  3.2<L>  /  TBili  0.3  /  DBili  x   /  AST  95<H>  /  ALT  103<H>  /  AlkPhos  50  08-12    PT/INR - ( 10 Aug 2023 18:16 )   PT: 13.0 sec;   INR: 1.16 ratio         PTT - ( 12 Aug 2023 06:24 )  PTT:80.8 sec    MEDICATIONS  (STANDING):  cefTRIAXone Injectable. 1000 milliGRAM(s) IV Push every 24 hours  heparin  Infusion. 900 Unit(s)/Hr (9 mL/Hr) IV Continuous <Continuous>  senna 2 Tablet(s) Oral at bedtime  sodium chloride 0.9%. 1000 milliLiter(s) (175 mL/Hr) IV Continuous <Continuous>    MEDICATIONS  (PRN):  acetaminophen     Tablet .. 650 milliGRAM(s) Oral every 6 hours PRN Mild Pain (1 - 3)  aluminum hydroxide/magnesium hydroxide/simethicone Suspension 30 milliLiter(s) Oral every 4 hours PRN Dyspepsia  heparin   Injectable 6500 Unit(s) IV Push every 6 hours PRN For aPTT less than 40  heparin   Injectable 3000 Unit(s) IV Push every 6 hours PRN For aPTT between 40 - 57  melatonin 3 milliGRAM(s) Oral at bedtime PRN Insomnia  ondansetron Injectable 4 milliGRAM(s) IV Push every 8 hours PRN Nausea and/or Vomiting    I&O's Summary    11 Aug 2023 07:01  -  12 Aug 2023 07:00  --------------------------------------------------------  IN: 1518 mL / OUT: 2000 mL / NET: -482 mL    12 Aug 2023 07:01  -  12 Aug 2023 11:50  --------------------------------------------------------  IN: 0 mL / OUT: 400 mL / NET: -400 mL        Physical Exam:  General: Awake, resposiveabnd appropriate, breathing comfortably,  ENT: PERRLA, EOMI, No JVD  Chest; B/L Bs, decreased at bases, no signifigant sputum production   CVS: S1,S2, Regular, No Murmurs noted   Abd: BS+Soft Non-tender, No rebound or localized pain, No Masses,   Ext: Warm and perfused, No Edema   Neuro: A+O x 3, Moving all extremities, non-focal         Assessment  27y Male  w/ PAST MEDICAL & SURGICAL HISTORY:  No pertinent past medical history      No significant past surgical history      admitted with complaints of Patient is a 27y old  Male who presents with a chief complaint of Acute Left Upper Ext DVT (12 Aug 2023 05:42)  .  On (Date), patient underwent Thrombectomy of axillary or subclavian vein    . Postoperative course/issues:

## 2023-08-12 NOTE — PROGRESS NOTE ADULT - SUBJECTIVE AND OBJECTIVE BOX
SURGERY DAILY PROGRESS NOTE:     Subjective:  Patient seen and examined at bedside during am rounds. Denies any active issues. hydrating himself PO.   AVSS. Denies any fevers, chills, n/v/d, chest pain or shortness of breath    Objective:    MEDICATIONS  (STANDING):  cefTRIAXone Injectable. 1000 milliGRAM(s) IV Push every 24 hours  heparin  Infusion. 900 Unit(s)/Hr (9 mL/Hr) IV Continuous <Continuous>  senna 2 Tablet(s) Oral at bedtime  sodium chloride 0.9%. 1000 milliLiter(s) (125 mL/Hr) IV Continuous <Continuous>    MEDICATIONS  (PRN):  acetaminophen     Tablet .. 650 milliGRAM(s) Oral every 6 hours PRN Mild Pain (1 - 3)  aluminum hydroxide/magnesium hydroxide/simethicone Suspension 30 milliLiter(s) Oral every 4 hours PRN Dyspepsia  heparin   Injectable 6500 Unit(s) IV Push every 6 hours PRN For aPTT less than 40  heparin   Injectable 3000 Unit(s) IV Push every 6 hours PRN For aPTT between 40 - 57  melatonin 3 milliGRAM(s) Oral at bedtime PRN Insomnia  ondansetron Injectable 4 milliGRAM(s) IV Push every 8 hours PRN Nausea and/or Vomiting      Vital Signs Last 24 Hrs  T(C): 36.8 (11 Aug 2023 20:51), Max: 37.1 (11 Aug 2023 09:19)  T(F): 98.2 (11 Aug 2023 20:51), Max: 98.8 (11 Aug 2023 09:19)  HR: 65 (12 Aug 2023 04:00) (55 - 87)  BP: 114/70 (12 Aug 2023 04:00) (114/51 - 135/70)  BP(mean): 83 (12 Aug 2023 04:00) (63 - 85)  RR: 17 (12 Aug 2023 04:00) (12 - 24)  SpO2: 98% (12 Aug 2023 04:00) (97% - 100%)    Parameters below as of 11 Aug 2023 20:00  Patient On (Oxygen Delivery Method): room air          PHYSICAL EXAM   Gen: well-appearing, in no acute distress  CV: pulse regularly present   Resp: airway patent, non-labored breathing  Extremities: left hand swelling improved, minimal erythema, non tender, capillary refil <2 sec, full ROM intact       I&O's Detail    10 Aug 2023 07:01  -  11 Aug 2023 07:00  --------------------------------------------------------  IN:    Lactated Ringers: 200 mL    Other (mL): 800 mL  Total IN: 1000 mL    OUT:  Total OUT: 0 mL    Total NET: 1000 mL      11 Aug 2023 07:01  -  12 Aug 2023 05:42  --------------------------------------------------------  IN:  Total IN: 0 mL    OUT:    Voided (mL): 400 mL  Total OUT: 400 mL    Total NET: -400 mL          Daily     Daily     LABS:                        13.8   17.09 )-----------( 191      ( 11 Aug 2023 07:21 )             40.0     08-11    141  |  113<H>  |  36<H>  ----------------------------<  106<H>  4.2   |  25  |  3.32<H>    Ca    8.5      11 Aug 2023 23:42  Phos  3.9     08-11  Mg     2.4     08-11      PT/INR - ( 10 Aug 2023 18:16 )   PT: 13.0 sec;   INR: 1.16 ratio         PTT - ( 11 Aug 2023 23:42 )  PTT:91.7 sec  Urinalysis Basic - ( 11 Aug 2023 23:42 )    Color: x / Appearance: x / SG: x / pH: x  Gluc: 106 mg/dL / Ketone: x  / Bili: x / Urobili: x   Blood: x / Protein: x / Nitrite: x   Leuk Esterase: x / RBC: x / WBC x   Sq Epi: x / Non Sq Epi: x / Bacteria: x

## 2023-08-12 NOTE — PROGRESS NOTE ADULT - ASSESSMENT
27yoM presenting with LUE Axillary vein and subclavian vein DVT  Thoracic outlet syndrome   s/p LUE thrombectomy on 8/9   Post op course complicated by recurrence of DVT in the axillary subclavian and basilic veins in the immediate postop   s/p angiojet thrombectomy of LUE 8/10  CORNELIO, Cr uptrending  PTT therapeutic x 1    Plan:    Continue AC with hep drip, trend to therapeutic levels   Continue Arm elevation  1st rib resection on 8/17 by cardiothoracic surgery   Rest of management as per ICU attending      Will discuss plan with Dr. Bobo

## 2023-08-12 NOTE — PROGRESS NOTE ADULT - SUBJECTIVE AND OBJECTIVE BOX
Patient is a 27y old  Male who presents with a chief complaint of Acute Left Upper Ext DVT       BRIEF HOSPITAL COURSE: *28y/o M with no sig PMH presents with LUE swelling. Found to have clot in left subclavian vein and underwent thrombectomy x2. Now iwth Sav from contrast load.     Events last 24 hours: patient seen and examined at the bedside. he is lying in bed, feels better. Still with some LUE swelling but overall improved. making clear urine.     8/12: Continues on UFH, Cr seems to be plateauing             PAST MEDICAL & SURGICAL HISTORY:  No pertinent past medical history      No significant past surgical history          FAMILY HISTORY:  No pertinent family history in first degree relatives        Social Hx:    Allergies    No Known Allergies    Intolerances            ICU Vital Signs Last 24 Hrs  T(C): 36.9 (12 Aug 2023 09:53), Max: 36.9 (11 Aug 2023 19:28)  T(F): 98.5 (12 Aug 2023 09:53), Max: 98.5 (11 Aug 2023 19:28)  HR: 77 (12 Aug 2023 14:00) (55 - 77)  BP: 109/65 (12 Aug 2023 14:00) (107/64 - 135/70)  BP(mean): 77 (12 Aug 2023 14:00) (77 - 85)  ABP: --  ABP(mean): --  RR: 21 (12 Aug 2023 14:00) (8 - 24)  SpO2: 100% (12 Aug 2023 14:00) (97% - 100%)    O2 Parameters below as of 12 Aug 2023 08:00  Patient On (Oxygen Delivery Method): room air                I&O's Summary    11 Aug 2023 07:01  -  12 Aug 2023 07:00  --------------------------------------------------------  IN: 1518 mL / OUT: 2000 mL / NET: -482 mL    12 Aug 2023 07:01  -  12 Aug 2023 14:37  --------------------------------------------------------  IN: 300 mL / OUT: 800 mL / NET: -500 mL                              12.0   9.65  )-----------( 157      ( 12 Aug 2023 06:24 )             35.2       08-12    143  |  112<H>  |  34<H>  ----------------------------<  95  4.2   |  25  |  3.45<H>    Ca    8.5      12 Aug 2023 06:24  Phos  4.2     08-12  Mg     2.3     08-12    TPro  6.2  /  Alb  3.2<L>  /  TBili  0.3  /  DBili  x   /  AST  95<H>  /  ALT  103<H>  /  AlkPhos  50  08-12      CARDIAC MARKERS ( 12 Aug 2023 06:24 )  x     / x     / 68 U/L / x     / x      CARDIAC MARKERS ( 11 Aug 2023 23:42 )  x     / x     / 88 U/L / x     / x                Urinalysis Basic - ( 12 Aug 2023 06:24 )    Color: x / Appearance: x / SG: x / pH: x  Gluc: 95 mg/dL / Ketone: x  / Bili: x / Urobili: x   Blood: x / Protein: x / Nitrite: x   Leuk Esterase: x / RBC: x / WBC x   Sq Epi: x / Non Sq Epi: x / Bacteria: x        MEDICATIONS  (STANDING):  cefTRIAXone Injectable. 1000 milliGRAM(s) IV Push every 24 hours  heparin  Infusion. 900 Unit(s)/Hr (9 mL/Hr) IV Continuous <Continuous>  senna 2 Tablet(s) Oral at bedtime  sodium chloride 0.9%. 1000 milliLiter(s) (175 mL/Hr) IV Continuous <Continuous>    MEDICATIONS  (PRN):  acetaminophen     Tablet .. 975 milliGRAM(s) Oral every 8 hours PRN Temp greater or equal to 38C (100.4F), Mild Pain (1 - 3)  aluminum hydroxide/magnesium hydroxide/simethicone Suspension 30 milliLiter(s) Oral every 4 hours PRN Dyspepsia  heparin   Injectable 6500 Unit(s) IV Push every 6 hours PRN For aPTT less than 40  heparin   Injectable 3000 Unit(s) IV Push every 6 hours PRN For aPTT between 40 - 57  melatonin 3 milliGRAM(s) Oral at bedtime PRN Insomnia  ondansetron Injectable 4 milliGRAM(s) IV Push every 8 hours PRN Nausea and/or Vomiting  oxyCODONE    IR 5 milliGRAM(s) Oral every 4 hours PRN Moderate Pain (4 - 6)      DVT Prophylaxis: UFH    Advanced Directives:  Discussed with:    Visit Information:    ** Time is exclusive of billed procedures and/or teaching and/or routine family updates.

## 2023-08-13 LAB
ANION GAP SERPL CALC-SCNC: 5 MMOL/L — SIGNIFICANT CHANGE UP (ref 5–17)
ANION GAP SERPL CALC-SCNC: 6 MMOL/L — SIGNIFICANT CHANGE UP (ref 5–17)
APTT BLD: 71.6 SEC — HIGH (ref 24.5–35.6)
BASOPHILS # BLD AUTO: 0.01 K/UL — SIGNIFICANT CHANGE UP (ref 0–0.2)
BASOPHILS NFR BLD AUTO: 0.1 % — SIGNIFICANT CHANGE UP (ref 0–2)
BUN SERPL-MCNC: 29 MG/DL — HIGH (ref 7–23)
BUN SERPL-MCNC: 30 MG/DL — HIGH (ref 7–23)
CALCIUM SERPL-MCNC: 8.3 MG/DL — LOW (ref 8.5–10.1)
CALCIUM SERPL-MCNC: 8.8 MG/DL — SIGNIFICANT CHANGE UP (ref 8.5–10.1)
CHLORIDE SERPL-SCNC: 112 MMOL/L — HIGH (ref 96–108)
CHLORIDE SERPL-SCNC: 117 MMOL/L — HIGH (ref 96–108)
CO2 SERPL-SCNC: 21 MMOL/L — LOW (ref 22–31)
CO2 SERPL-SCNC: 24 MMOL/L — SIGNIFICANT CHANGE UP (ref 22–31)
CREAT SERPL-MCNC: 3.46 MG/DL — HIGH (ref 0.5–1.3)
CREAT SERPL-MCNC: 3.46 MG/DL — HIGH (ref 0.5–1.3)
EGFR: 24 ML/MIN/1.73M2 — LOW
EGFR: 24 ML/MIN/1.73M2 — LOW
EOSINOPHIL # BLD AUTO: 0.04 K/UL — SIGNIFICANT CHANGE UP (ref 0–0.5)
EOSINOPHIL NFR BLD AUTO: 0.4 % — SIGNIFICANT CHANGE UP (ref 0–6)
GLUCOSE SERPL-MCNC: 88 MG/DL — SIGNIFICANT CHANGE UP (ref 70–99)
GLUCOSE SERPL-MCNC: 94 MG/DL — SIGNIFICANT CHANGE UP (ref 70–99)
HCT VFR BLD CALC: 35.9 % — LOW (ref 39–50)
HGB BLD-MCNC: 11.9 G/DL — LOW (ref 13–17)
IMM GRANULOCYTES NFR BLD AUTO: 0.4 % — SIGNIFICANT CHANGE UP (ref 0–0.9)
LYMPHOCYTES # BLD AUTO: 0.97 K/UL — LOW (ref 1–3.3)
LYMPHOCYTES # BLD AUTO: 10.8 % — LOW (ref 13–44)
MAGNESIUM SERPL-MCNC: 2.2 MG/DL — SIGNIFICANT CHANGE UP (ref 1.6–2.6)
MCHC RBC-ENTMCNC: 29 PG — SIGNIFICANT CHANGE UP (ref 27–34)
MCHC RBC-ENTMCNC: 33.1 GM/DL — SIGNIFICANT CHANGE UP (ref 32–36)
MCV RBC AUTO: 87.6 FL — SIGNIFICANT CHANGE UP (ref 80–100)
MONOCYTES # BLD AUTO: 1.14 K/UL — HIGH (ref 0–0.9)
MONOCYTES NFR BLD AUTO: 12.7 % — SIGNIFICANT CHANGE UP (ref 2–14)
NEUTROPHILS # BLD AUTO: 6.79 K/UL — SIGNIFICANT CHANGE UP (ref 1.8–7.4)
NEUTROPHILS NFR BLD AUTO: 75.6 % — SIGNIFICANT CHANGE UP (ref 43–77)
PHOSPHATE SERPL-MCNC: 4 MG/DL — SIGNIFICANT CHANGE UP (ref 2.5–4.5)
PLATELET # BLD AUTO: 145 K/UL — LOW (ref 150–400)
POTASSIUM SERPL-MCNC: 4.1 MMOL/L — SIGNIFICANT CHANGE UP (ref 3.5–5.3)
POTASSIUM SERPL-MCNC: 4.3 MMOL/L — SIGNIFICANT CHANGE UP (ref 3.5–5.3)
POTASSIUM SERPL-SCNC: 4.1 MMOL/L — SIGNIFICANT CHANGE UP (ref 3.5–5.3)
POTASSIUM SERPL-SCNC: 4.3 MMOL/L — SIGNIFICANT CHANGE UP (ref 3.5–5.3)
RBC # BLD: 4.1 M/UL — LOW (ref 4.2–5.8)
RBC # FLD: 12.2 % — SIGNIFICANT CHANGE UP (ref 10.3–14.5)
SODIUM SERPL-SCNC: 141 MMOL/L — SIGNIFICANT CHANGE UP (ref 135–145)
SODIUM SERPL-SCNC: 144 MMOL/L — SIGNIFICANT CHANGE UP (ref 135–145)
WBC # BLD: 8.99 K/UL — SIGNIFICANT CHANGE UP (ref 3.8–10.5)
WBC # FLD AUTO: 8.99 K/UL — SIGNIFICANT CHANGE UP (ref 3.8–10.5)

## 2023-08-13 PROCEDURE — 99232 SBSQ HOSP IP/OBS MODERATE 35: CPT

## 2023-08-13 RX ORDER — SODIUM CHLORIDE 9 MG/ML
1000 INJECTION, SOLUTION INTRAVENOUS
Refills: 0 | Status: DISCONTINUED | OUTPATIENT
Start: 2023-08-13 | End: 2023-08-19

## 2023-08-13 RX ORDER — POLYETHYLENE GLYCOL 3350 17 G/17G
17 POWDER, FOR SOLUTION ORAL DAILY
Refills: 0 | Status: DISCONTINUED | OUTPATIENT
Start: 2023-08-13 | End: 2023-08-14

## 2023-08-13 RX ADMIN — SODIUM CHLORIDE 175 MILLILITER(S): 9 INJECTION INTRAMUSCULAR; INTRAVENOUS; SUBCUTANEOUS at 00:10

## 2023-08-13 RX ADMIN — ONDANSETRON 4 MILLIGRAM(S): 8 TABLET, FILM COATED ORAL at 06:42

## 2023-08-13 RX ADMIN — Medication 975 MILLIGRAM(S): at 07:10

## 2023-08-13 RX ADMIN — ONDANSETRON 4 MILLIGRAM(S): 8 TABLET, FILM COATED ORAL at 17:54

## 2023-08-13 RX ADMIN — HEPARIN SODIUM 1300 UNIT(S)/HR: 5000 INJECTION INTRAVENOUS; SUBCUTANEOUS at 00:10

## 2023-08-13 RX ADMIN — SODIUM CHLORIDE 175 MILLILITER(S): 9 INJECTION INTRAMUSCULAR; INTRAVENOUS; SUBCUTANEOUS at 06:01

## 2023-08-13 RX ADMIN — HEPARIN SODIUM 1300 UNIT(S)/HR: 5000 INJECTION INTRAVENOUS; SUBCUTANEOUS at 07:57

## 2023-08-13 RX ADMIN — HEPARIN SODIUM 1300 UNIT(S)/HR: 5000 INJECTION INTRAVENOUS; SUBCUTANEOUS at 19:53

## 2023-08-13 RX ADMIN — CEFTRIAXONE 1000 MILLIGRAM(S): 500 INJECTION, POWDER, FOR SOLUTION INTRAMUSCULAR; INTRAVENOUS at 18:00

## 2023-08-13 RX ADMIN — HEPARIN SODIUM 1300 UNIT(S)/HR: 5000 INJECTION INTRAVENOUS; SUBCUTANEOUS at 17:47

## 2023-08-13 RX ADMIN — SODIUM CHLORIDE 125 MILLILITER(S): 9 INJECTION, SOLUTION INTRAVENOUS at 22:49

## 2023-08-13 RX ADMIN — Medication 975 MILLIGRAM(S): at 06:42

## 2023-08-13 RX ADMIN — POLYETHYLENE GLYCOL 3350 17 GRAM(S): 17 POWDER, FOR SOLUTION ORAL at 12:51

## 2023-08-13 RX ADMIN — SODIUM CHLORIDE 125 MILLILITER(S): 9 INJECTION, SOLUTION INTRAVENOUS at 14:05

## 2023-08-13 NOTE — PROGRESS NOTE ADULT - ASSESSMENT
27 yo male with no past medical hx presenting with acute LUE swelling found with acute subclavian clot and possible thoracic outlet syndrome    CORNELIO  - mutlifactorial    Heme pigment CORNELIO  +/- contrast nephropathy    post large clot thrombolysis load and multiple contrast loads    IVF aggressive hydration to promote  - 200 /hour - NS 1 liter bolus given , continue 125/     urine myoglobin, urine heme pigment   renal vein doppler r/o RVT to rule out clot migration / emboli    avoid or minimize further contrast load   if further clot lysis required will need aggressive hydration    trend Cr levels after IVF daily    strict I/o   no NSAID     d/w pt and family at bedside ( Christen LORENZ RN )   d/w 1 N RN at length     Thank you for the courtesy of this consult. We will follow this patient with you.   Management is subject to change if new information becomes available or patient condition changes.    8/12  CORNELIO  L arm DVT  On heparin   creat plateauing     8/13  Creat plateaued 3.4 range   hopefully will start to trend down  Hypernatremic on NS   will switch to 1/2 NS  d/w Pt and his mother

## 2023-08-13 NOTE — PROGRESS NOTE ADULT - SUBJECTIVE AND OBJECTIVE BOX
SURGERY DAILY PROGRESS NOTE:     Subjective:  Patient seen and examined at bedside during am rounds. Continues to have discomfort of LUE but not worsening. hydrating himself PO.   AVSS. Denies any fevers, chills, n/v/d, chest pain or shortness of breath    Objective:      Vitals:  T(C): 36.9 ( @ 14:39), Max: 36.9 ( @ 09:53)  HR: 60 ( @ 04:00) (58 - 77)  BP: 91/78 ( @ 04:00) (91/78 - 120/69)  RR: 16 ( @ 04:00) (8 - 22)  SpO2: 100% ( @ 04:00) (96% - 100%)     @ 07:01  -   @ 07:00  --------------------------------------------------------  IN:    Heparin Infusion: 143 mL    sodium chloride 0.9%: 1375 mL  Total IN: 1518 mL    OUT:    Voided (mL): 2000 mL  Total OUT: 2000 mL    Total NET: -482 mL       @ 07:01  -   @ 06:03  --------------------------------------------------------  IN:    Heparin Infusion: 143 mL    IV PiggyBack: 10 mL    Oral Fluid: 540 mL    sodium chloride 0.9%: 1625 mL  Total IN: 2318 mL    OUT:    Voided (mL): 2400 mL  Total OUT: 2400 mL    Total NET: -82 mL                PHYSICAL EXAM   Gen: well-appearing, in no acute distress  CV: pulse regularly present   Resp: airway patent, non-labored breathing  Extremities: left hand swelling improved, minimal erythema, non tender, capillary refil <2 sec, full ROM intact          @ 16:58                    -  CBC: ->)-------(<-                     -                 146 | 118 | 32    CMP:  ----------------------< 85               3.8 | 24 | 3.29                      Ca:7.8  Phos:-  Mg:-               -|      |-        LFTs:  ------|-|-----             -|      |-  08-12 @ 06:24                    12.0  CBC: 9.65>)-------(<157                     35.2                 143 | 112 | 34    CMP:  ----------------------< 95               4.2 | 25 | 3.45                      Ca:8.5  Phos:4.2  M.3               0.3|      |95        LFTs:  ------|50|-----             -|      |-   @ 23:42                    -  CBC: ->)-------(<-                     -                 141 | 113 | 36    CMP:  ----------------------< 106               4.2 | 25 | 3.32                      Ca:8.5  Phos:-  Mg:-               -|      |-        LFTs:  ------|-|-----             -|      |-      Culture - Urine (collected 08-10-23 @ 17:46)  Source: Clean Catch None  Final Report (23 @ 22:19):    No growth      Current Inpatient Medications:  acetaminophen     Tablet .. 975 milliGRAM(s) Oral every 8 hours PRN  aluminum hydroxide/magnesium hydroxide/simethicone Suspension 30 milliLiter(s) Oral every 4 hours PRN  cefTRIAXone Injectable. 1000 milliGRAM(s) IV Push every 24 hours  heparin   Injectable 6500 Unit(s) IV Push every 6 hours PRN  heparin   Injectable 3000 Unit(s) IV Push every 6 hours PRN  heparin  Infusion. 900 Unit(s)/Hr (9 mL/Hr) IV Continuous <Continuous>  melatonin 3 milliGRAM(s) Oral at bedtime PRN  ondansetron Injectable 4 milliGRAM(s) IV Push every 8 hours PRN  oxyCODONE    IR 5 milliGRAM(s) Oral every 4 hours PRN  senna 2 Tablet(s) Oral at bedtime  sodium chloride 0.9%. 1000 milliLiter(s) (175 mL/Hr) IV Continuous <Continuous>

## 2023-08-13 NOTE — PROGRESS NOTE ADULT - SUBJECTIVE AND OBJECTIVE BOX
27y Male  presenting with LUE Axillary vein and subclavian vein DVT with Thoracic outlet syndrome. s/p LUE thrombectomy on 8/9 in IR. Post op course complicated by recurrence of DVT in the axillary subclavian and basilic veins in the immediate postop period. S/p angiojet thrombectomy of LUE 8/10. Now on heparin gtt. Called for evaluation for 1st rib resection.      Awake and comfortable resting in bed now.  C/O of less arm pain and swelling       Vital Signs:  Vital Signs Last 24 Hrs  T(C): 36.8 (08-13-23 @ 14:28), Max: 37.1 (08-13-23 @ 08:26)  T(F): 98.3 (08-13-23 @ 14:28), Max: 98.8 (08-13-23 @ 08:26)  HR: 56 (08-13-23 @ 10:00) (56 - 85)  BP: 121/65 (08-13-23 @ 14:06) (91/78 - 125/70)  RR: 19 (08-13-23 @ 10:00) (11 - 22)  SpO2: 99% (08-13-23 @ 14:06) (96% - 100%) on (O2)    Telemetry/Alarms:    Relevant labs, radiology and Medications reviewed                        11.9   8.99  )-----------( 145      ( 13 Aug 2023 06:37 )             35.9     08-13    144  |  117<H>  |  30<H>  ----------------------------<  88  4.3   |  21<L>  |  3.46<H>    Ca    8.3<L>      13 Aug 2023 06:37  Phos  4.0     08-13  Mg     2.2     08-13    TPro  6.2  /  Alb  3.2<L>  /  TBili  0.3  /  DBili  x   /  AST  95<H>  /  ALT  103<H>  /  AlkPhos  50  08-12    PTT - ( 13 Aug 2023 06:37 )  PTT:71.6 sec    MEDICATIONS  (STANDING):  cefTRIAXone Injectable. 1000 milliGRAM(s) IV Push every 24 hours  heparin  Infusion. 900 Unit(s)/Hr (9 mL/Hr) IV Continuous <Continuous>  polyethylene glycol 3350 17 Gram(s) Oral daily  senna 2 Tablet(s) Oral at bedtime  sodium chloride 0.45%. 1000 milliLiter(s) (125 mL/Hr) IV Continuous <Continuous>    MEDICATIONS  (PRN):  acetaminophen     Tablet .. 975 milliGRAM(s) Oral every 8 hours PRN Temp greater or equal to 38C (100.4F), Mild Pain (1 - 3)  aluminum hydroxide/magnesium hydroxide/simethicone Suspension 30 milliLiter(s) Oral every 4 hours PRN Dyspepsia  heparin   Injectable 6500 Unit(s) IV Push every 6 hours PRN For aPTT less than 40  heparin   Injectable 3000 Unit(s) IV Push every 6 hours PRN For aPTT between 40 - 57  melatonin 3 milliGRAM(s) Oral at bedtime PRN Insomnia  ondansetron Injectable 4 milliGRAM(s) IV Push every 8 hours PRN Nausea and/or Vomiting  oxyCODONE    IR 5 milliGRAM(s) Oral every 4 hours PRN Moderate Pain (4 - 6)    I&O's Summary    12 Aug 2023 07:01  -  13 Aug 2023 07:00  --------------------------------------------------------  IN: 4386 mL / OUT: 4550 mL / NET: -164 mL    13 Aug 2023 07:01  -  13 Aug 2023 15:10  --------------------------------------------------------  IN: 1050 mL / OUT: 0 mL / NET: 1050 mL        Physical Exam:  General: Awake, resposiveabnd appropriate, breathing comfortably,  ENT: PERRLA, EOMI, No JVD  Chest; B/L Bs, decreased at bases, no signifigant sputum production   CVS: S1,S2, Regular, No Murmurs noted   Abd: BS+Soft Non-tender, No rebound or localized pain, No Masses,   Ext: Warm and perfused, No Edema   Neuro: A+O x 3, Moving all extremities, non-focal

## 2023-08-13 NOTE — PROGRESS NOTE ADULT - ASSESSMENT
27yoM presenting with LUE Axillary vein and subclavian vein DVT  Thoracic outlet syndrome   s/p LUE thrombectomy on 8/9   Post op course complicated by recurrence of DVT in the axillary subclavian and basilic veins in the immediate postop   s/p angiojet thrombectomy of LUE 8/10  CORNELIO, Cr uptrending  PTT therapeutic x 1    Plan:  Pain control PRN  Continue AC with hep drip, trend to therapeutic levels   Continue Arm elevation; ACE wrap PRN  1st rib resection on 8/17 by cardiothoracic surgery   Nephrology on board evaluation of CORNELIO  ICU team on board for medical management  Daily labs    Will discuss plan with Dr. Bobo

## 2023-08-13 NOTE — PROGRESS NOTE ADULT - SUBJECTIVE AND OBJECTIVE BOX
27 year old with no past medical hx of clots presenting w acute LUE swelling found w  Acute Thrombis LUE and MRI suspicious for thoracic outlet syndrome   pt required acute clot thrombolysis w high clot load ~ 280 cc of clot , pt states she was urinating dark after procedure . pt states he is now urinating clear urine.  Denies any flank pains. pt also had multiple contrast load  with CTA chest and  venograms     8/12  Seen at bedside  discussed w pts parents    8/13  OOB no complaints      PAST MEDICAL & SURGICAL HISTORY:  No pertinent past medical history    No significant past surgical history    Home Medications:   none     MEDICATIONS  (STANDING):  cefTRIAXone Injectable. 1000 milliGRAM(s) IV Push every 24 hours  heparin  Infusion. 900 Unit(s)/Hr (9 mL/Hr) IV Continuous <Continuous>  polyethylene glycol 3350 17 Gram(s) Oral daily  senna 2 Tablet(s) Oral at bedtime  sodium chloride 0.45%. 1000 milliLiter(s) (125 mL/Hr) IV Continuous <Continuous>    MEDICATIONS  (PRN):  acetaminophen     Tablet .. 975 milliGRAM(s) Oral every 8 hours PRN Temp greater or equal to 38C (100.4F), Mild Pain (1 - 3)  aluminum hydroxide/magnesium hydroxide/simethicone Suspension 30 milliLiter(s) Oral every 4 hours PRN Dyspepsia  heparin   Injectable 3000 Unit(s) IV Push every 6 hours PRN For aPTT between 40 - 57  heparin   Injectable 6500 Unit(s) IV Push every 6 hours PRN For aPTT less than 40  melatonin 3 milliGRAM(s) Oral at bedtime PRN Insomnia  ondansetron Injectable 4 milliGRAM(s) IV Push every 8 hours PRN Nausea and/or Vomiting  oxyCODONE    IR 5 milliGRAM(s) Oral every 4 hours PRN Moderate Pain (4 - 6)      Allergies    No Known Allergies    Intolerances        SOCIAL HISTORY:  Denies ETOh,Smoking,     FAMILY HISTORY:  No pertinent family history in first degree relatives      I&O's Detail    12 Aug 2023 07:01  -  13 Aug 2023 07:00  --------------------------------------------------------  IN:    Heparin Infusion: 286 mL    IV PiggyBack: 10 mL    Oral Fluid: 540 mL    sodium chloride 0.9%: 3550 mL  Total IN: 4386 mL    OUT:    Voided (mL): 4550 mL  Total OUT: 4550 mL    Total NET: -164 mL      13 Aug 2023 07:01  -  13 Aug 2023 18:54  --------------------------------------------------------  IN:    Heparin Infusion: 156 mL    IV PiggyBack: 10 mL    Oral Fluid: 760 mL    sodium chloride 0.45%: 750 mL    sodium chloride 0.9%: 1050 mL  Total IN: 2726 mL    OUT:    Voided (mL): 1500 mL  Total OUT: 1500 mL    Total NET: 1226 mL    ICU Vital Signs Last 24 Hrs  T(C): 37 (13 Aug 2023 16:19), Max: 37.1 (13 Aug 2023 08:26)  T(F): 98.6 (13 Aug 2023 16:19), Max: 98.8 (13 Aug 2023 08:26)  HR: 67 (13 Aug 2023 18:00) (56 - 85)  BP: 131/81 (13 Aug 2023 18:00) (91/78 - 134/71)  BP(mean): 95 (13 Aug 2023 18:00) (75 - 95)  ABP: --  ABP(mean): --  RR: 16 (13 Aug 2023 18:00) (16 - 20)  SpO2: 99% (13 Aug 2023 18:00) (96% - 100%)    O2 Parameters below as of 13 Aug 2023 08:00  Patient On (Oxygen Delivery Method): room air    REVIEW OF SYSTEMS:    CONSTITUTIONAL: No weakness, fevers or chills  EYES/ENT: No visual changes;  No vertigo or throat pain   NECK: No pain or stiffness  RESPIRATORY: No cough, wheezing, hemoptysis; No shortness of breath  CARDIOVASCULAR: No chest pain or palpitations  GASTROINTESTINAL: No abdominal or epigastric pain. No nausea, vomiting, or hematemesis; No diarrhea or constipation. No melena or hematochezia.  GENITOURINARY: No dysuria, frequency or hematuria  NEUROLOGICAL: No numbness or weakness  SKIN: No itching, burning, rashes, or lesions   All other review of systems is negative unless indicated above.      PHYSICAL EXAM:    Constitutional: NAD  HEENT: PERRLA, EOMI,  MMM  Neck: No LAD, No JVD  Respiratory: CTAB  Cardiovascular: S1 and S2  Gastrointestinal: BS+, soft, NT/ND, no cva tenderness   Extremities: No peripheral edema, LUE edema mild , move all ext   Neurological: A/O x 3, no focal deficits  : No Damico  Skin: No rashes  Access: Not applicable    LABS:                          11.9   8.99  )-----------( 145      ( 13 Aug 2023 06:37 )             35.9                             12.0   9.65  )-----------( 157      ( 12 Aug 2023 06:24 )             35.2                                      13.8   17.09 )-----------( 191      ( 11 Aug 2023 07:21 )             40.0                         13.9   10.57 )-----------( 184      ( 10 Aug 2023 17:08 )             40.0     08-13    144  |  117<H>  |  30<H>  ----------------------------<  88  4.3   |  21<L>  |  3.46<H>    Ca    8.3<L>      13 Aug 2023 06:37  Phos  4.0     08-13  Mg     2.2     08-13    TPro  6.2  /  Alb  3.2<L>  /  TBili  0.3  /  DBili  x   /  AST  95<H>  /  ALT  103<H>  /  AlkPhos  50  08-12      08-12    143  |  112<H>  |  34<H>  ----------------------------<  95  4.2   |  25  |  3.45<H>    Ca    8.5      12 Aug 2023 06:24  Phos  4.2     08-12  Mg     2.3     08-12    TPro  6.2  /  Alb  3.2<L>  /  TBili  0.3  /  DBili  x   /  AST  95<H>  /  ALT  103<H>  /  AlkPhos  50  08-12      140    |  111    |  30     ----------------------------<  129       11 Aug 2023 07:21  4.5     |  24     |  2.66     136    |  106    |  19     ----------------------------<  142       10 Aug 2023 18:16  4.8     |  26     |  1.75     139    |  110    |  12     ----------------------------<  99        10 Aug 2023 07:23  4.2     |  25     |  0.97     Ca    9.2        11 Aug 2023 07:21  Ca    9.1        10 Aug 2023 18:16    Phos  3.9       11 Aug 2023 07:21  Phos  3.3       10 Aug 2023 07:23    Mg     2.4       11 Aug 2023 07:21  Mg     2.2       10 Aug 2023 07:23        Urine Microscopic-Add On (NC) (08.10.23 @ 17:46)   Red Blood Cell - Urine: 6-10 /HPF  White Blood Cell - Urine: 0-2 /HPF  Bacteria: Moderate  Comment - Urine: moderate hyphae-like cells seen  Squamous Epithelial Cells: Negative    Urinalysis (08.10.23 @ 17:46)   Glucose Qualitative, Urine: Negative  Blood, Urine: Large  pH Urine: 8.0  Color: Brown  Urine Appearance: Clear  Bilirubin: Negative  Ketone - Urine: Trace  Specific Gravity: 1.010  Protein, Urine: 100  Urobilinogen: Negative  Nitrite: Positive  Leukocyte Esterase Concentration: Trace          RADIOLOGY & ADDITIONAL STUDIES:

## 2023-08-13 NOTE — PROGRESS NOTE ADULT - ASSESSMENT
27y Male  presenting with LUE Axillary vein and subclavian vein DVT with Thoracic outlet syndrome. s/p LUE thrombectomy on 8/9 in IR. Post op course complicated by recurrence of DVT in the axillary subclavian and basilic veins in the immediate postop period. S/p angiojet thrombectomy of LUE 8/10. Now on heparin gtt. Called for evaluation for 1st rib resection.      Imp/Plan    1) Thoracic outlet syndrome with recurrent LUE DVT: Now on FD Heparin infusion. Awaiting 1st rib resection this week. Developed CORNELIO, likely related to thrombolysis and/OR IV Contrst.  Cont IV Heparin. Timing of surgery to be discussed tomorrow. Possible transfer to [facility] for surgery on Tuesday, vs remain at  and go to OR on Thursday.. Creat  plateaued now. Will continue Aggressive hydration. Avoid and nephrotoxic agents. F/U BUN/Creat. F/U Renal ultrasound is negative for pathology.  As per Nephrology    2) CORNELIO: as noted above        LAZARO Lozoya

## 2023-08-14 LAB
ADD ON TEST-SPECIMEN IN LAB: SIGNIFICANT CHANGE UP
ADD ON TEST-SPECIMEN IN LAB: SIGNIFICANT CHANGE UP
ALBUMIN SERPL ELPH-MCNC: 2.9 G/DL — LOW (ref 3.3–5)
ALP SERPL-CCNC: 48 U/L — SIGNIFICANT CHANGE UP (ref 40–120)
ALT FLD-CCNC: 100 U/L — HIGH (ref 12–78)
AMYLASE P1 CFR SERPL: 36 U/L — SIGNIFICANT CHANGE UP (ref 25–115)
ANION GAP SERPL CALC-SCNC: 6 MMOL/L — SIGNIFICANT CHANGE UP (ref 5–17)
APTT BLD: 57.1 SEC — HIGH (ref 24.5–35.6)
APTT BLD: 67.4 SEC — HIGH (ref 24.5–35.6)
AST SERPL-CCNC: 48 U/L — HIGH (ref 15–37)
BASOPHILS # BLD AUTO: 0.02 K/UL — SIGNIFICANT CHANGE UP (ref 0–0.2)
BASOPHILS NFR BLD AUTO: 0.2 % — SIGNIFICANT CHANGE UP (ref 0–2)
BILIRUB DIRECT SERPL-MCNC: 0.1 MG/DL — SIGNIFICANT CHANGE UP (ref 0–0.3)
BILIRUB INDIRECT FLD-MCNC: 0.3 MG/DL — SIGNIFICANT CHANGE UP (ref 0.2–1)
BILIRUB SERPL-MCNC: 0.4 MG/DL — SIGNIFICANT CHANGE UP (ref 0.2–1.2)
BUN SERPL-MCNC: 27 MG/DL — HIGH (ref 7–23)
CALCIUM SERPL-MCNC: 8.3 MG/DL — LOW (ref 8.5–10.1)
CHLORIDE SERPL-SCNC: 113 MMOL/L — HIGH (ref 96–108)
CO2 SERPL-SCNC: 23 MMOL/L — SIGNIFICANT CHANGE UP (ref 22–31)
CREAT SERPL-MCNC: 3.32 MG/DL — HIGH (ref 0.5–1.3)
EGFR: 25 ML/MIN/1.73M2 — LOW
EOSINOPHIL # BLD AUTO: 0.05 K/UL — SIGNIFICANT CHANGE UP (ref 0–0.5)
EOSINOPHIL NFR BLD AUTO: 0.6 % — SIGNIFICANT CHANGE UP (ref 0–6)
GLUCOSE SERPL-MCNC: 93 MG/DL — SIGNIFICANT CHANGE UP (ref 70–99)
HCT VFR BLD CALC: 33.1 % — LOW (ref 39–50)
HGB BLD-MCNC: 11.4 G/DL — LOW (ref 13–17)
IMM GRANULOCYTES NFR BLD AUTO: 0.6 % — SIGNIFICANT CHANGE UP (ref 0–0.9)
INR BLD: 1.16 RATIO — SIGNIFICANT CHANGE UP (ref 0.85–1.18)
LIDOCAIN IGE QN: 65 U/L — LOW (ref 73–393)
LIDOCAIN IGE QN: 69 U/L — LOW (ref 73–393)
LYMPHOCYTES # BLD AUTO: 0.98 K/UL — LOW (ref 1–3.3)
LYMPHOCYTES # BLD AUTO: 11.3 % — LOW (ref 13–44)
MAGNESIUM SERPL-MCNC: 2.1 MG/DL — SIGNIFICANT CHANGE UP (ref 1.6–2.6)
MCHC RBC-ENTMCNC: 29.6 PG — SIGNIFICANT CHANGE UP (ref 27–34)
MCHC RBC-ENTMCNC: 34.4 GM/DL — SIGNIFICANT CHANGE UP (ref 32–36)
MCV RBC AUTO: 86 FL — SIGNIFICANT CHANGE UP (ref 80–100)
MONOCYTES # BLD AUTO: 1 K/UL — HIGH (ref 0–0.9)
MONOCYTES NFR BLD AUTO: 11.5 % — SIGNIFICANT CHANGE UP (ref 2–14)
NEUTROPHILS # BLD AUTO: 6.57 K/UL — SIGNIFICANT CHANGE UP (ref 1.8–7.4)
NEUTROPHILS NFR BLD AUTO: 75.8 % — SIGNIFICANT CHANGE UP (ref 43–77)
PHOSPHATE SERPL-MCNC: 3.7 MG/DL — SIGNIFICANT CHANGE UP (ref 2.5–4.5)
PLATELET # BLD AUTO: 148 K/UL — LOW (ref 150–400)
POTASSIUM SERPL-MCNC: 4.2 MMOL/L — SIGNIFICANT CHANGE UP (ref 3.5–5.3)
POTASSIUM SERPL-SCNC: 4.2 MMOL/L — SIGNIFICANT CHANGE UP (ref 3.5–5.3)
PROT SERPL-MCNC: 6.1 GM/DL — SIGNIFICANT CHANGE UP (ref 6–8.3)
PROTHROM AB SERPL-ACNC: 13.1 SEC — HIGH (ref 9.5–13)
RBC # BLD: 3.85 M/UL — LOW (ref 4.2–5.8)
RBC # FLD: 11.9 % — SIGNIFICANT CHANGE UP (ref 10.3–14.5)
SODIUM SERPL-SCNC: 142 MMOL/L — SIGNIFICANT CHANGE UP (ref 135–145)
WBC # BLD: 8.67 K/UL — SIGNIFICANT CHANGE UP (ref 3.8–10.5)
WBC # FLD AUTO: 8.67 K/UL — SIGNIFICANT CHANGE UP (ref 3.8–10.5)

## 2023-08-14 PROCEDURE — 76700 US EXAM ABDOM COMPLETE: CPT | Mod: 26,59

## 2023-08-14 PROCEDURE — 74018 RADEX ABDOMEN 1 VIEW: CPT | Mod: 26

## 2023-08-14 PROCEDURE — 99232 SBSQ HOSP IP/OBS MODERATE 35: CPT

## 2023-08-14 PROCEDURE — 76705 ECHO EXAM OF ABDOMEN: CPT | Mod: 26

## 2023-08-14 PROCEDURE — 99231 SBSQ HOSP IP/OBS SF/LOW 25: CPT

## 2023-08-14 PROCEDURE — 93975 VASCULAR STUDY: CPT | Mod: 26

## 2023-08-14 RX ORDER — PROCHLORPERAZINE MALEATE 5 MG
5 TABLET ORAL EVERY 6 HOURS
Refills: 0 | Status: DISCONTINUED | OUTPATIENT
Start: 2023-08-14 | End: 2023-08-17

## 2023-08-14 RX ORDER — ACETAMINOPHEN 500 MG
1000 TABLET ORAL ONCE
Refills: 0 | Status: COMPLETED | OUTPATIENT
Start: 2023-08-14 | End: 2023-08-14

## 2023-08-14 RX ORDER — PANTOPRAZOLE SODIUM 20 MG/1
40 TABLET, DELAYED RELEASE ORAL DAILY
Refills: 0 | Status: DISCONTINUED | OUTPATIENT
Start: 2023-08-15 | End: 2023-08-17

## 2023-08-14 RX ORDER — HEPARIN SODIUM 5000 [USP'U]/ML
1500 INJECTION INTRAVENOUS; SUBCUTANEOUS
Qty: 25000 | Refills: 0 | Status: DISCONTINUED | OUTPATIENT
Start: 2023-08-14 | End: 2023-08-17

## 2023-08-14 RX ORDER — METOCLOPRAMIDE HCL 10 MG
10 TABLET ORAL ONCE
Refills: 0 | Status: COMPLETED | OUTPATIENT
Start: 2023-08-14 | End: 2023-08-14

## 2023-08-14 RX ORDER — ACETAMINOPHEN 500 MG
1000 TABLET ORAL ONCE
Refills: 0 | Status: COMPLETED | OUTPATIENT
Start: 2023-08-14

## 2023-08-14 RX ORDER — PANTOPRAZOLE SODIUM 20 MG/1
40 TABLET, DELAYED RELEASE ORAL ONCE
Refills: 0 | Status: COMPLETED | OUTPATIENT
Start: 2023-08-14 | End: 2023-08-14

## 2023-08-14 RX ADMIN — Medication 3 MILLIGRAM(S): at 21:12

## 2023-08-14 RX ADMIN — PANTOPRAZOLE SODIUM 40 MILLIGRAM(S): 20 TABLET, DELAYED RELEASE ORAL at 08:48

## 2023-08-14 RX ADMIN — OXYCODONE HYDROCHLORIDE 5 MILLIGRAM(S): 5 TABLET ORAL at 06:33

## 2023-08-14 RX ADMIN — HEPARIN SODIUM 1500 UNIT(S)/HR: 5000 INJECTION INTRAVENOUS; SUBCUTANEOUS at 20:33

## 2023-08-14 RX ADMIN — Medication 10 MILLIGRAM(S): at 13:38

## 2023-08-14 RX ADMIN — OXYCODONE HYDROCHLORIDE 5 MILLIGRAM(S): 5 TABLET ORAL at 07:00

## 2023-08-14 RX ADMIN — SODIUM CHLORIDE 125 MILLILITER(S): 9 INJECTION, SOLUTION INTRAVENOUS at 06:34

## 2023-08-14 RX ADMIN — ONDANSETRON 4 MILLIGRAM(S): 8 TABLET, FILM COATED ORAL at 03:58

## 2023-08-14 RX ADMIN — SODIUM CHLORIDE 150 MILLILITER(S): 9 INJECTION, SOLUTION INTRAVENOUS at 20:51

## 2023-08-14 RX ADMIN — Medication 5 MILLIGRAM(S): at 21:11

## 2023-08-14 RX ADMIN — SODIUM CHLORIDE 125 MILLILITER(S): 9 INJECTION, SOLUTION INTRAVENOUS at 15:04

## 2023-08-14 RX ADMIN — Medication 1000 MILLIGRAM(S): at 19:23

## 2023-08-14 RX ADMIN — HEPARIN SODIUM 1300 UNIT(S)/HR: 5000 INJECTION INTRAVENOUS; SUBCUTANEOUS at 13:32

## 2023-08-14 RX ADMIN — HEPARIN SODIUM 1300 UNIT(S)/HR: 5000 INJECTION INTRAVENOUS; SUBCUTANEOUS at 12:37

## 2023-08-14 RX ADMIN — Medication 400 MILLIGRAM(S): at 19:05

## 2023-08-14 RX ADMIN — SENNA PLUS 2 TABLET(S): 8.6 TABLET ORAL at 21:11

## 2023-08-14 RX ADMIN — HEPARIN SODIUM 1300 UNIT(S)/HR: 5000 INJECTION INTRAVENOUS; SUBCUTANEOUS at 07:22

## 2023-08-14 NOTE — PROGRESS NOTE ADULT - SUBJECTIVE AND OBJECTIVE BOX
Subjective:  Pt seen, c/o GI upset, Nausea, discomfort. Also c/o left arm stiffness. Creatinine 3.32 today.    Vital Signs:  Vital Signs Last 24 Hrs  T(C): 36.7 (08-14-23 @ 08:55), Max: 37 (08-13-23 @ 16:19)  T(F): 98.1 (08-14-23 @ 08:55), Max: 98.6 (08-13-23 @ 16:19)  HR: 56 (08-14-23 @ 08:00) (56 - 75)  BP: 120/68 (08-14-23 @ 08:00) (117/64 - 139/80)  RR: 16 (08-13-23 @ 18:00) (16 - 19)  SpO2: 98% (08-14-23 @ 08:00) (96% - 100%) on (O2)    Telemetry/Alarms:    Relevant labs, radiology and Medications reviewed                        11.4   8.67  )-----------( 148      ( 14 Aug 2023 05:45 )             33.1     08-14    142  |  113<H>  |  27<H>  ----------------------------<  93  4.2   |  23  |  3.32<H>    Ca    8.3<L>      14 Aug 2023 05:45  Phos  3.7     08-14  Mg     2.1     08-14      PT/INR - ( 14 Aug 2023 05:45 )   PT: 13.1 sec;   INR: 1.16 ratio         PTT - ( 14 Aug 2023 05:45 )  PTT:67.4 sec  MEDICATIONS  (STANDING):  heparin  Infusion. 900 Unit(s)/Hr (9 mL/Hr) IV Continuous <Continuous>  pantoprazole  Injectable 40 milliGRAM(s) IV Push daily  polyethylene glycol 3350 17 Gram(s) Oral daily  senna 2 Tablet(s) Oral at bedtime  sodium chloride 0.45%. 1000 milliLiter(s) (125 mL/Hr) IV Continuous <Continuous>    MEDICATIONS  (PRN):  acetaminophen     Tablet .. 975 milliGRAM(s) Oral every 8 hours PRN Temp greater or equal to 38C (100.4F), Mild Pain (1 - 3)  aluminum hydroxide/magnesium hydroxide/simethicone Suspension 30 milliLiter(s) Oral every 4 hours PRN Dyspepsia  heparin   Injectable 3000 Unit(s) IV Push every 6 hours PRN For aPTT between 40 - 57  heparin   Injectable 6500 Unit(s) IV Push every 6 hours PRN For aPTT less than 40  melatonin 3 milliGRAM(s) Oral at bedtime PRN Insomnia  ondansetron Injectable 4 milliGRAM(s) IV Push every 8 hours PRN Nausea and/or Vomiting  oxyCODONE    IR 5 milliGRAM(s) Oral every 4 hours PRN Moderate Pain (4 - 6)  prochlorperazine   Injectable 5 milliGRAM(s) IV Push every 6 hours PRN nausea/vomiting      Physical exam  General: NAD                                                         Neuro: A+O x 3, non-focal, speech clear and intact                     ENT: Normal exam of nasal/oral mucosa with absence of cyanosis.   Neck: supple, no JVD, trachea midline   Chest:  equal expansion bilaterally,  normal excursion, no accessory muscle use note  CV: RRR,   GI: soft, NT, ND,   Extremities: warm, LLE swelling arm, + radial pulse  SKIN: warm, dry, intact     I&O's Summary    13 Aug 2023 07:01  -  14 Aug 2023 07:00  --------------------------------------------------------  IN: 4245 mL / OUT: 3850 mL / NET: 395 mL    14 Aug 2023 07:01  -  14 Aug 2023 09:40  --------------------------------------------------------  IN: 100 mL / OUT: 300 mL / NET: -200 mL        Assessment  27y Male  w/ PAST MEDICAL & SURGICAL HISTORY:  No pertinent past medical history      No significant past surgical history      admitted with complaints of Patient is a 27y old  Male who presents with a chief complaint of Acute Left Upper Ext DVT (14 Aug 2023 04:59)  .  27y Male  presenting with LUE Axillary vein and subclavian vein DVT with Thoracic outlet syndrome. s/p LUE thrombectomy on 8/9 in IR. Post op course complicated by recurrence of DVT in the axillary subclavian and basilic veins in the immediate postop period. S/p angiojet thrombectomy of LUE 8/10. Now on heparin gtt. Called for evaluation for 1st rib resection.    First rib resection scheduled for Thursday, 8/17. Decided to stay at  for Thursday procedure  cont full dose anticoagulation  protonix ordered for GI upset  tylenol and oxy IR as needed for discomfort  d/w pt and mom at bedside  OOB  trend renal function, cont fluids as per renal    Discussed with Cardiothoracic Team at AM rounds.

## 2023-08-14 NOTE — PROCEDURE NOTE - ADDITIONAL PROCEDURE DETAILS
Dx/indication: Difficult vascular access, on heparin drips and multiple IV medications  Procedure: US guided 20g 60mm PIV    Done with full sterile technique    Dynamic US used  1st stick  Image obtained  No complications  Good blood return and easy, pain free flushing

## 2023-08-14 NOTE — PROGRESS NOTE ADULT - ASSESSMENT
27yoM presenting with LUE Axillary vein and subclavian vein DVT  Thoracic outlet syndrome   s/p LUE thrombectomy on 8/9   Post op course complicated by recurrence of DVT in the axillary subclavian and basilic veins in the immediate postop   s/p angiojet thrombectomy of LUE 8/10  CORNELIO, Cr uptrending. Nephrology on board  PTT therapeutic x 1    Plan:  Pain control PRN  Continue AC with hep drip, therapeutic  Continue Arm elevation; ACE wrap PRN  1st rib resection on 8/17 by cardiothoracic surgery vs possible transfer. Pending discussion with family today  Nephrology on board evaluation of CORNELIO  ICU team on board for medical management  Daily labs    Will discuss plan with Dr. Kim   27yoM presenting with LUE Axillary vein and subclavian vein DVT  Thoracic outlet syndrome   s/p LUE thrombectomy on 8/9   Post op course complicated by recurrence of DVT in the axillary subclavian and basilic veins in the immediate postop   s/p angiojet thrombectomy of LUE 8/10  CORNELIO, Cr uptrending. Nephrology on board  PTT therapeutic     Plan:  Pain control PRN  Continue AC with hep drip, therapeutic  Continue Arm elevation; ACE wrap PRN  1st rib resection on 8/17 by cardiothoracic surgery vs possible transfer. Pending discussion with family today  Nephrology on board evaluation of CORNELIO  ICU team on board for medical management  Daily labs    Will discuss plan with Dr. Kim

## 2023-08-14 NOTE — PROCEDURE NOTE - NSPROCDETAILS_GEN_ALL_CORE
blood seen on insertion/flushes easily/secured in place
blood seen on insertion/dressing applied/flushes easily/secured in place
location identified, draped/prepped, sterile technique used/blood seen on insertion/dressing applied/flushes easily/secured in place/sterile technique, catheter placed

## 2023-08-14 NOTE — PROCEDURE NOTE - NSSITEPREP_SKIN_A_CORE
alcohol/Adherence to aseptic technique: hand hygiene prior to donning barriers (gown, gloves), don cap and mask, sterile drape over patient
alcohol
chlorhexidine

## 2023-08-14 NOTE — PROGRESS NOTE ADULT - ASSESSMENT
ASSESSMENT  26 yo Male with no PMHx presented c/o left arm swelling and pain with purple discoloration that started yesterday. No fever or chills, No ho trauma. No recent travel. As per EMR pt working out a lot, very active lifestyle, works as .     US doppler revealing L axillary vein thrombosis. CTA neg for PE  MR chest with segmental acute thrombosis subclavian and axillary veins with evidence L sided thoracic outlet syndrome    s/p thrombectomy of subclavian and axillary vein 8/9  Post op course b/l recurrence of LUE clot. and worsening CORNELIO    s/p thrombectomy of subclavian to basilic vein 8/10    PLAN  - mentation intact. pain control prn . no NSAIDs. IV tylenol, dilaudid  - on room air.   - PO diet as tolerated. pt did not have BM for days. finally had one yesterday- described hard stools. will readjust bowel regimen prn. added dulcolax   -  ASSESSMENT  28 yo Male with no PMHx presented c/o left arm swelling and pain with purple discoloration that started yesterday. No fever or chills, No ho trauma. No recent travel. As per EMR pt working out a lot, very active lifestyle, works as .     US doppler revealing L axillary vein thrombosis. CTA neg for PE  MR chest with segmental acute thrombosis subclavian and axillary veins with evidence L sided thoracic outlet syndrome    s/p thrombectomy of subclavian and axillary vein 8/9  Post op course b/l recurrence of LUE clot. and worsening CORNELIO    s/p thrombectomy of subclavian to basilic vein 8/10    PLAN  Neuro:  mentation intact. pain control prn . no NSAIDs. IV tylenol, dilaudid  Pulm: on room air.   GI: PO diet as tolerated. pt did not have BM for days. finally had one yesterday- described hard stools. will readjust bowel regimen prn. added dulcolax. pt c/o frequent nausea. has been receiving IV zofran with temporary relief. will try reglan . may be related to opioids vs constipation vs gastritis.   Neprho: CORNELIO likely contrast induced and poor PO intake. Cr only slightly improved 3.4 -> 3.3. cont 1/2 NS @ 125 and encourage more PO intake. US kidney doppler neg for renal vein thrombosis.   ID:  no abx indicated at this time. urine cx neg. afebrile. moniotr temps  Heme: cont IV heparin gtt. elevated LUE. SCDs. plan for 1st rib removal for thoracic outlet syndrome on 8/17    Dispo: Trend renal fxn. IV fluids. IV heparin gtt. plan for 1st rib removal for thoracic outlet syndrome on 8/17    WIll discuss with Dr. Harrell

## 2023-08-14 NOTE — PROGRESS NOTE ADULT - SUBJECTIVE AND OBJECTIVE BOX
Patient is a 27y old  Male who presents with a chief complaint of Acute Left Upper Ext DVT (14 Aug 2023 09:40)    BRIEF HOSPITAL COURSE: 28 yo Male with no PMHx presented c/o left arm swelling and pain with purple discoloration that started yesterday. No fever or chills, No ho trauma. No recent travel. As per EMR pt working out a lot, very active lifestyle, works as .   Found to have L axillary vein thrombosis  CTA neg for PE  MR chest with segmental acute thrombosis subclavian and axillary veins with evidence L sided thoracic outlet syndrome    s/p thrombectomy of subclavian and axillary vein 8/9  Post op course b/l recurrence of LUE clot. and worsening CORNELIO    s/p thrombectomy of subclavian to basilic vein 8/10    8/14 c/o feeling nauseous still with constipation. Poor PO intake. states abd pain is in LUQ, non tender to palpation. denies dizziness. last BM yesterday - described as hard stools. states he is voiding.     PAST MEDICAL & SURGICAL HISTORY:  No pertinent past medical history  No significant past surgical history      Medications:  acetaminophen     Tablet .. 975 milliGRAM(s) Oral every 8 hours PRN  melatonin 3 milliGRAM(s) Oral at bedtime PRN  metoclopramide Injectable 10 milliGRAM(s) IV Push once PRN  oxyCODONE    IR 5 milliGRAM(s) Oral every 4 hours PRN  prochlorperazine   Injectable 5 milliGRAM(s) IV Push every 6 hours PRN  heparin   Injectable 6500 Unit(s) IV Push every 6 hours PRN  heparin   Injectable 3000 Unit(s) IV Push every 6 hours PRN  heparin  Infusion. 900 Unit(s)/Hr IV Continuous <Continuous>  aluminum hydroxide/magnesium hydroxide/simethicone Suspension 30 milliLiter(s) Oral every 4 hours PRN  bisacodyl 5 milliGRAM(s) Oral every 12 hours PRN  pantoprazole  Injectable 40 milliGRAM(s) IV Push daily  senna 2 Tablet(s) Oral at bedtime  sodium chloride 0.45%. 1000 milliLiter(s) IV Continuous <Continuous>      ICU Vital Signs Last 24 Hrs  T(C): 36.7 (14 Aug 2023 08:55), Max: 37 (13 Aug 2023 16:19)  T(F): 98.1 (14 Aug 2023 08:55), Max: 98.6 (13 Aug 2023 16:19)  HR: 56 (14 Aug 2023 08:00) (56 - 75)  BP: 120/68 (14 Aug 2023 08:00) (120/68 - 139/80)  BP(mean): 82 (14 Aug 2023 08:00) (81 - 96)  ABP: --  ABP(mean): --  RR: 16 (13 Aug 2023 18:00) (16 - 16)  SpO2: 98% (14 Aug 2023 08:00) (96% - 100%)    O2 Parameters below as of 14 Aug 2023 08:00  Patient On (Oxygen Delivery Method): room       I&O's Detail    13 Aug 2023 07:01  -  14 Aug 2023 07:00  --------------------------------------------------------  IN:    Heparin Infusion: 300 mL    IV PiggyBack: 10 mL    Oral Fluid: 760 mL    sodium chloride 0.45%: 2125 mL    sodium chloride 0.9%: 1050 mL  Total IN: 4245 mL    OUT:    Voided (mL): 3850 mL  Total OUT: 3850 mL    Total NET: 395 mL      14 Aug 2023 07:01  -  14 Aug 2023 12:07  --------------------------------------------------------  IN:    Oral Fluid: 100 mL  Total IN: 100 mL    OUT:    Voided (mL): 300 mL  Total OUT: 300 mL    Total NET: -200 mL            LABS:                        11.4   8.67  )-----------( 148      ( 14 Aug 2023 05:45 )             33.1     08-14    142  |  113<H>  |  27<H>  ----------------------------<  93  4.2   |  23  |  3.32<H>    Ca    8.3<L>      14 Aug 2023 05:45  Phos  3.7     08-14  Mg     2.1     08-14      CAPILLARY BLOOD GLUCOSE  PT/INR - ( 14 Aug 2023 05:45 )   PT: 13.1 sec;   INR: 1.16 ratio    PTT - ( 14 Aug 2023 05:45 )  PTT:67.4 sec  Urinalysis Basic - ( 14 Aug 2023 05:45 )    Color: x / Appearance: x / SG: x / pH: x  Gluc: 93 mg/dL / Ketone: x  / Bili: x / Urobili: x   Blood: x / Protein: x / Nitrite: x   Leuk Esterase: x / RBC: x / WBC x   Sq Epi: x / Non Sq Epi: x / Bacteria: x      CULTURES:  Culture Results:   No growth (08-10 @ 17:46)      Physical Examination:    General: No acute distress.    HEENT: Pupils equal, reactive to light.  Symmetric.  PULM: Clear to ausculttion bilaterally  CVS: Regular rate and rhythm, no murmurs  ABD: Soft, nondistended, nontender, normoactive bowel sound  EXT: LUE with mild edema, non tender  SKIN: Warm and well perfused, no rashes noted.  NEURO: Alert, oriented, interactive    DEVICES:     RADIOLOGY:   < from: US Duplex Venous Upper Ext Ltd, Left (08.09.23 @ 18:56) >  IMPRESSION:  Acute, occlusive deep venous thrombosis in the left subclavian and   axillary veins.  Acute, occlusive superficial venous thrombosis in the left basilic vein.  I discussed the findings with  on 08/09/2023 at 7:05 PM.  Read   back verification was obtained.    < end of copied text >      < from: US Duplex Kidneys (08.12.23 @ 08:28) >  IMPRESSION:    No evidence of renal vein thrombosis.    < end of copied text >    < from: US Kidney and Bladder (08.10.23 @ 16:57) >    IMPRESSION:  No hydronephrosis or renal calculus.    Underdistended urinary bladder, limiting evaluation with wall thickening   which may be secondary to underdistention.    < end of copied text >

## 2023-08-14 NOTE — PROGRESS NOTE ADULT - SUBJECTIVE AND OBJECTIVE BOX
27 year old with no past medical hx of clots presenting w acute LUE swelling found w  Acute Thrombis LUE and MRI suspicious for thoracic outlet syndrome   pt required acute clot thrombolysis w high clot load ~ 280 cc of clot , pt states she was urinating dark after procedure . pt states he is now urinating clear urine.  Denies any flank pains. pt also had multiple contrast load  with CTA chest and  venograms     8/14    seen w parents at bedside   pt c/o abd pains diffuse, nonspecific    assoc w nausea and vomiting   no sob or cp    urinating well          PAST MEDICAL & SURGICAL HISTORY:  No pertinent past medical history    No significant past surgical history    Home Medications:   none     MEDICATIONS  (STANDING):  heparin  Infusion. 900 Unit(s)/Hr (9 mL/Hr) IV Continuous <Continuous>  pantoprazole  Injectable 40 milliGRAM(s) IV Push daily  senna 2 Tablet(s) Oral at bedtime  sodium chloride 0.45%. 1000 milliLiter(s) (125 mL/Hr) IV Continuous <Continuous>        Allergies    No Known Allergies    Intolerances    Vital Signs Last 24 Hrs  T(C): 37 (14 Aug 2023 17:54), Max: 37 (14 Aug 2023 17:54)  T(F): 98.6 (14 Aug 2023 17:54), Max: 98.6 (14 Aug 2023 17:54)  HR: 60 (14 Aug 2023 17:00) (56 - 81)  BP: 132/75 (14 Aug 2023 14:00) (120/63 - 139/80)  BP(mean): 91 (14 Aug 2023 14:00) (79 - 96)  RR: --  SpO2: 100% (14 Aug 2023 17:00) (96% - 100%)    Parameters below as of 14 Aug 2023 08:00  Patient On (Oxygen Delivery Method): room air    I&O's Detail    13 Aug 2023 07:01  -  14 Aug 2023 07:00  --------------------------------------------------------  IN:    Heparin Infusion: 300 mL    IV PiggyBack: 10 mL    Oral Fluid: 760 mL    sodium chloride 0.45%: 2125 mL    sodium chloride 0.9%: 1050 mL  Total IN: 4245 mL    OUT:    Voided (mL): 3850 mL  Total OUT: 3850 mL    Total NET: 395 mL      14 Aug 2023 07:01  -  14 Aug 2023 19:36  --------------------------------------------------------  IN:    Heparin Infusion: 164 mL    Oral Fluid: 100 mL    sodium chloride 0.45%: 1375 mL  Total IN: 1639 mL    OUT:    Voided (mL): 1400 mL  Total OUT: 1400 mL    Total NET: 239 mL        PHYSICAL EXAM:    Constitutional: NAD  HEENT: , EOMI,  MM  Neck: No LAD, No JVD  Respiratory: CTAB  Cardiovascular: S1 and S2  Gastrointestinal: BS+, soft, NT/ND, no cva tenderness - no guarding or rebound   Extremities: No peripheral edema, LUE edema mild , move all ext   Neurological: A/O x 3, no focal deficits  : No Damico  Skin: No rashes  Access: Not applicable    LABS:                            11.4   8.67  )-----------( 148      ( 14 Aug 2023 05:45 )             33.1                         11.9   8.99  )-----------( 145      ( 13 Aug 2023 06:37 )             35.9                    142    |  113    |  27     ----------------------------<  93        14 Aug 2023 05:45  4.2     |  23     |  3.32     141    |  112    |  29     ----------------------------<  94        13 Aug 2023 19:41  4.1     |  24     |  3.46     144    |  117    |  30     ----------------------------<  88        13 Aug 2023 06:37  4.3     |  21     |  3.46     Ca    8.3        14 Aug 2023 05:45  Ca    8.8        13 Aug 2023 19:41    Phos  3.7       14 Aug 2023 05:45  Phos  4.0       13 Aug 2023 06:37    Mg     2.1       14 Aug 2023 05:45  Mg     2.2       13 Aug 2023 06:37    TPro  6.1    /  Alb  2.9    /  TBili  0.4    /        14 Aug 2023 05:45  DBili  0.1    /  AST  48     /  ALT  100    /  AlkPhos  48       TPro  6.2    /  Alb  3.2    /  TBili  0.3    /        12 Aug 2023 06:24  DBili  x      /  AST  95     /  ALT  103    /  AlkPhos  50             140    |  111    |  30     ----------------------------<  129       11 Aug 2023 07:21  4.5     |  24     |  2.66     136    |  106    |  19     ----------------------------<  142       10 Aug 2023 18:16  4.8     |  26     |  1.75     139    |  110    |  12     ----------------------------<  99        10 Aug 2023 07:23  4.2     |  25     |  0.97     Ca    9.2        11 Aug 2023 07:21  Ca    9.1        10 Aug 2023 18:16    Phos  3.9       11 Aug 2023 07:21  Phos  3.3       10 Aug 2023 07:23    Mg     2.4       11 Aug 2023 07:21  Mg     2.2       10 Aug 2023 07:23        Urine Microscopic-Add On (NC) (08.10.23 @ 17:46)   Red Blood Cell - Urine: 6-10 /HPF  White Blood Cell - Urine: 0-2 /HPF  Bacteria: Moderate  Comment - Urine: moderate hyphae-like cells seen  Squamous Epithelial Cells: Negative    Urinalysis (08.10.23 @ 17:46)   Glucose Qualitative, Urine: Negative  Blood, Urine: Large  pH Urine: 8.0  Color: Brown  Urine Appearance: Clear  Bilirubin: Negative  Ketone - Urine: Trace  Specific Gravity: 1.010  Protein, Urine: 100  Urobilinogen: Negative  Nitrite: Positive  Leukocyte Esterase Concentration: Trace          RADIOLOGY & ADDITIONAL STUDIES:

## 2023-08-14 NOTE — PROGRESS NOTE ADULT - ASSESSMENT
27 yo male with no past medical hx presenting with acute LUE swelling found with acute subclavian clot and possible thoracic outlet syndrome    CORNELIO  - mutlifactorial    Heme pigment CORNELIO  +/- contrast nephropathy    post large clot thrombolysis load and multiple contrast loads    IVF aggressive hydration to promote  - 200 /hour - NS 1 liter bolus given , continue 1/2 NS - 125/ hr    Cr appears hopefully peaked    urine myoglobin, urine heme pigment to be sent    renal vein doppler r/o RVT to rule out clot migration / emboli  was neg    avoid or minimize further contrast load   if further clot lysis required will need aggressive hydration    trend Cr levels after IVF daily    strict I/o   no NSAID     No clear etio for N, V and abd pains      d/w pt and family at bedside (mother -  Christen LORENZ RN )     d/w Ila Mcfarland and RN at length

## 2023-08-14 NOTE — PROGRESS NOTE ADULT - SUBJECTIVE AND OBJECTIVE BOX
SURGERY DAILY PROGRESS NOTE:     Subjective:  Patient seen and examined at bedside during am rounds. Continues to have discomfort of LUE but improving. Hydrating himself PO. Mentionned one episode of vomiting yesterday after he was given miralax and had nausea afterward which now resolved.   AVSS. Denies any fevers, chills, n/v/d, chest pain or shortness of breath    Objective:      Vitals:  Vital Signs Last 24 Hrs  T(C): 37 (13 Aug 2023 16:19), Max: 37.1 (13 Aug 2023 08:26)  T(F): 98.6 (13 Aug 2023 16:19), Max: 98.8 (13 Aug 2023 08:26)  HR: 75 (14 Aug 2023 04:00) (56 - 85)  BP: 136/73 (14 Aug 2023 04:00) (109/66 - 139/80)  BP(mean): 91 (14 Aug 2023 04:00) (79 - 96)  RR: 16 (13 Aug 2023 18:00) (16 - 19)  SpO2: 98% (14 Aug 2023 04:00) (96% - 100%)    Parameters below as of 13 Aug 2023 08:00  Patient On (Oxygen Delivery Method): room air      --------------------------------------------------------  IN:    Heparin Infusion: 143 mL    sodium chloride 0.9%: 1375 mL  Total IN: 1518 mL    OUT:    Voided (mL): 2000 mL  Total OUT: 2000 mL    Total NET: -482 mL      08 @ 07: @ 06:03  --------------------------------------------------------  IN:    Heparin Infusion: 143 mL    IV PiggyBack: 10 mL    Oral Fluid: 540 mL    sodium chloride 0.9%: 1625 mL  Total IN: 2318 mL    OUT:    Voided (mL): 2400 mL  Total OUT: 2400 mL    Total NET: -82 mL                PHYSICAL EXAM   Gen: well-appearing, in no acute distress  CV: pulse regularly present   Resp: airway patent, non-labored breathing  Extremities: left hand swelling improved, minimal erythema, mild ecchymosis in antecubital fossa non tender, capillary refill <2 sec, full ROM intact. Radial pulse +2         @ 16:58                    -  CBC: ->)-------(<-                     -                 146 | 118 | 32    CMP:  ----------------------< 85               3.8 | 24 | 3.29                      Ca:7.8  Phos:-  Mg:-               -|      |-        LFTs:  ------|-|-----             -|      |-   @ 06:24                    12.0  CBC: 9.65>)-------(<157                     35.2                 143 | 112 | 34    CMP:  ----------------------< 95               4.2 | 25 | 3.45                      Ca:8.5  Phos:4.2  M.3               0.3|      |95        LFTs:  ------|50|-----             -|      |-   @ 23:42                    -  CBC: ->)-------(<-                     -                 141 | 113 | 36    CMP:  ----------------------< 106               4.2 | 25 | 3.32                      Ca:8.5  Phos:-  Mg:-               -|      |-        LFTs:  ------|-|-----             -|      |-      Culture - Urine (collected 08-10-23 @ 17:46)  Source: Clean Catch None  Final Report (23 @ 22:19):    No growth      Current Inpatient Medications:  acetaminophen     Tablet .. 975 milliGRAM(s) Oral every 8 hours PRN  aluminum hydroxide/magnesium hydroxide/simethicone Suspension 30 milliLiter(s) Oral every 4 hours PRN  cefTRIAXone Injectable. 1000 milliGRAM(s) IV Push every 24 hours  heparin   Injectable 6500 Unit(s) IV Push every 6 hours PRN  heparin   Injectable 3000 Unit(s) IV Push every 6 hours PRN  heparin  Infusion. 900 Unit(s)/Hr (9 mL/Hr) IV Continuous <Continuous>  melatonin 3 milliGRAM(s) Oral at bedtime PRN  ondansetron Injectable 4 milliGRAM(s) IV Push every 8 hours PRN  oxyCODONE    IR 5 milliGRAM(s) Oral every 4 hours PRN  senna 2 Tablet(s) Oral at bedtime  sodium chloride 0.9%. 1000 milliLiter(s) (175 mL/Hr) IV Continuous <Continuous>

## 2023-08-15 LAB
ADD ON TEST-SPECIMEN IN LAB: SIGNIFICANT CHANGE UP
ALBUMIN SERPL ELPH-MCNC: 3.1 G/DL — LOW (ref 3.3–5)
ALP SERPL-CCNC: 52 U/L — SIGNIFICANT CHANGE UP (ref 40–120)
ALT FLD-CCNC: 94 U/L — HIGH (ref 12–78)
ANION GAP SERPL CALC-SCNC: 6 MMOL/L — SIGNIFICANT CHANGE UP (ref 5–17)
APTT BLD: 73.7 SEC — HIGH (ref 24.5–35.6)
APTT BLD: 73.9 SEC — HIGH (ref 24.5–35.6)
APTT BLD: 89.9 SEC — HIGH (ref 24.5–35.6)
AST SERPL-CCNC: 53 U/L — HIGH (ref 15–37)
BASOPHILS # BLD AUTO: 0.02 K/UL — SIGNIFICANT CHANGE UP (ref 0–0.2)
BASOPHILS NFR BLD AUTO: 0.3 % — SIGNIFICANT CHANGE UP (ref 0–2)
BILIRUB DIRECT SERPL-MCNC: 0.1 MG/DL — SIGNIFICANT CHANGE UP (ref 0–0.3)
BILIRUB INDIRECT FLD-MCNC: 0.4 MG/DL — SIGNIFICANT CHANGE UP (ref 0.2–1)
BILIRUB SERPL-MCNC: 0.5 MG/DL — SIGNIFICANT CHANGE UP (ref 0.2–1.2)
BUN SERPL-MCNC: 25 MG/DL — HIGH (ref 7–23)
CALCIUM SERPL-MCNC: 8.6 MG/DL — SIGNIFICANT CHANGE UP (ref 8.5–10.1)
CHLORIDE SERPL-SCNC: 113 MMOL/L — HIGH (ref 96–108)
CO2 SERPL-SCNC: 22 MMOL/L — SIGNIFICANT CHANGE UP (ref 22–31)
CREAT SERPL-MCNC: 3.31 MG/DL — HIGH (ref 0.5–1.3)
EGFR: 25 ML/MIN/1.73M2 — LOW
EOSINOPHIL # BLD AUTO: 0.07 K/UL — SIGNIFICANT CHANGE UP (ref 0–0.5)
EOSINOPHIL NFR BLD AUTO: 1 % — SIGNIFICANT CHANGE UP (ref 0–6)
GLUCOSE SERPL-MCNC: 95 MG/DL — SIGNIFICANT CHANGE UP (ref 70–99)
HCT VFR BLD CALC: 31.7 % — LOW (ref 39–50)
HCT VFR BLD CALC: 35 % — LOW (ref 39–50)
HGB BLD-MCNC: 11 G/DL — LOW (ref 13–17)
HGB BLD-MCNC: 11.8 G/DL — LOW (ref 13–17)
IMM GRANULOCYTES NFR BLD AUTO: 0.5 % — SIGNIFICANT CHANGE UP (ref 0–0.9)
INR BLD: 1.11 RATIO — SIGNIFICANT CHANGE UP (ref 0.85–1.18)
INR BLD: 1.13 RATIO — SIGNIFICANT CHANGE UP (ref 0.85–1.18)
INR BLD: 1.2 RATIO — HIGH (ref 0.85–1.18)
LYMPHOCYTES # BLD AUTO: 0.89 K/UL — LOW (ref 1–3.3)
LYMPHOCYTES # BLD AUTO: 12.2 % — LOW (ref 13–44)
MAGNESIUM SERPL-MCNC: 2.3 MG/DL — SIGNIFICANT CHANGE UP (ref 1.6–2.6)
MCHC RBC-ENTMCNC: 29 PG — SIGNIFICANT CHANGE UP (ref 27–34)
MCHC RBC-ENTMCNC: 29.6 PG — SIGNIFICANT CHANGE UP (ref 27–34)
MCHC RBC-ENTMCNC: 33.7 GM/DL — SIGNIFICANT CHANGE UP (ref 32–36)
MCHC RBC-ENTMCNC: 34.7 GM/DL — SIGNIFICANT CHANGE UP (ref 32–36)
MCV RBC AUTO: 85.4 FL — SIGNIFICANT CHANGE UP (ref 80–100)
MCV RBC AUTO: 86 FL — SIGNIFICANT CHANGE UP (ref 80–100)
MONOCYTES # BLD AUTO: 0.93 K/UL — HIGH (ref 0–0.9)
MONOCYTES NFR BLD AUTO: 12.7 % — SIGNIFICANT CHANGE UP (ref 2–14)
NEUTROPHILS # BLD AUTO: 5.37 K/UL — SIGNIFICANT CHANGE UP (ref 1.8–7.4)
NEUTROPHILS NFR BLD AUTO: 73.3 % — SIGNIFICANT CHANGE UP (ref 43–77)
PHOSPHATE SERPL-MCNC: 3.9 MG/DL — SIGNIFICANT CHANGE UP (ref 2.5–4.5)
PLATELET # BLD AUTO: 133 K/UL — LOW (ref 150–400)
PLATELET # BLD AUTO: 152 K/UL — SIGNIFICANT CHANGE UP (ref 150–400)
POTASSIUM SERPL-MCNC: 4.3 MMOL/L — SIGNIFICANT CHANGE UP (ref 3.5–5.3)
POTASSIUM SERPL-SCNC: 4.3 MMOL/L — SIGNIFICANT CHANGE UP (ref 3.5–5.3)
PROT SERPL-MCNC: 6.3 GM/DL — SIGNIFICANT CHANGE UP (ref 6–8.3)
PROTHROM AB SERPL-ACNC: 12.5 SEC — SIGNIFICANT CHANGE UP (ref 9.5–13)
PROTHROM AB SERPL-ACNC: 12.7 SEC — SIGNIFICANT CHANGE UP (ref 9.5–13)
PROTHROM AB SERPL-ACNC: 13.5 SEC — HIGH (ref 9.5–13)
RBC # BLD: 3.71 M/UL — LOW (ref 4.2–5.8)
RBC # BLD: 4.07 M/UL — LOW (ref 4.2–5.8)
RBC # FLD: 11.9 % — SIGNIFICANT CHANGE UP (ref 10.3–14.5)
RBC # FLD: 11.9 % — SIGNIFICANT CHANGE UP (ref 10.3–14.5)
SODIUM SERPL-SCNC: 141 MMOL/L — SIGNIFICANT CHANGE UP (ref 135–145)
WBC # BLD: 7.26 K/UL — SIGNIFICANT CHANGE UP (ref 3.8–10.5)
WBC # BLD: 7.32 K/UL — SIGNIFICANT CHANGE UP (ref 3.8–10.5)
WBC # FLD AUTO: 7.26 K/UL — SIGNIFICANT CHANGE UP (ref 3.8–10.5)
WBC # FLD AUTO: 7.32 K/UL — SIGNIFICANT CHANGE UP (ref 3.8–10.5)

## 2023-08-15 PROCEDURE — 99231 SBSQ HOSP IP/OBS SF/LOW 25: CPT

## 2023-08-15 PROCEDURE — 99232 SBSQ HOSP IP/OBS MODERATE 35: CPT

## 2023-08-15 RX ORDER — ACETAMINOPHEN 500 MG
1000 TABLET ORAL ONCE
Refills: 0 | Status: COMPLETED | OUTPATIENT
Start: 2023-08-15 | End: 2023-08-15

## 2023-08-15 RX ADMIN — Medication 1000 MILLIGRAM(S): at 01:24

## 2023-08-15 RX ADMIN — Medication 1000 MILLIGRAM(S): at 18:20

## 2023-08-15 RX ADMIN — SODIUM CHLORIDE 150 MILLILITER(S): 9 INJECTION, SOLUTION INTRAVENOUS at 06:11

## 2023-08-15 RX ADMIN — HEPARIN SODIUM 1500 UNIT(S)/HR: 5000 INJECTION INTRAVENOUS; SUBCUTANEOUS at 07:32

## 2023-08-15 RX ADMIN — Medication 5 MILLIGRAM(S): at 17:08

## 2023-08-15 RX ADMIN — Medication 400 MILLIGRAM(S): at 17:27

## 2023-08-15 RX ADMIN — PANTOPRAZOLE SODIUM 40 MILLIGRAM(S): 20 TABLET, DELAYED RELEASE ORAL at 08:56

## 2023-08-15 RX ADMIN — HEPARIN SODIUM 1500 UNIT(S)/HR: 5000 INJECTION INTRAVENOUS; SUBCUTANEOUS at 02:32

## 2023-08-15 RX ADMIN — Medication 400 MILLIGRAM(S): at 08:58

## 2023-08-15 RX ADMIN — HEPARIN SODIUM 1500 UNIT(S)/HR: 5000 INJECTION INTRAVENOUS; SUBCUTANEOUS at 06:11

## 2023-08-15 RX ADMIN — Medication 400 MILLIGRAM(S): at 01:05

## 2023-08-15 RX ADMIN — HEPARIN SODIUM 1500 UNIT(S)/HR: 5000 INJECTION INTRAVENOUS; SUBCUTANEOUS at 19:22

## 2023-08-15 RX ADMIN — HEPARIN SODIUM 1500 UNIT(S)/HR: 5000 INJECTION INTRAVENOUS; SUBCUTANEOUS at 08:59

## 2023-08-15 RX ADMIN — Medication 1000 MILLIGRAM(S): at 09:20

## 2023-08-15 RX ADMIN — HEPARIN SODIUM 1500 UNIT(S)/HR: 5000 INJECTION INTRAVENOUS; SUBCUTANEOUS at 23:09

## 2023-08-15 NOTE — PROGRESS NOTE ADULT - ASSESSMENT
25 yo male with no past medical hx presenting with acute LUE swelling found with acute subclavian clot and possible thoracic outlet syndrome    CORNELIO  - mutlifactorial    Heme pigment CORNELIO  +/- contrast nephropathy    post large clot thrombolysis load and multiple contrast loads    Cr appears to platueaed - awaiting recovery    good UOP    IVF aggressive hydration to promote  - 200 /hour - continue 1/2 NS - 125/ hr    urine myoglobin, urine heme pigment pending   UA neg RBC, 600 mg protein    renal vein doppler  neg for thriombus/emboli  was neg    liver doppler neg for portal vein thrombus    avoid or minimize further contrast load   if further clot lysis required will need aggressive pre hydration    strict I/o   no NSAID    Hopeful renal recovery soon   No absolute renal CI for planned rib resection 8/17 if Cr remains stable     d/w pt and family at bedside (mother -  Christen LORENZ RN ) at length     d/w Ila Mcfarland and RN at length

## 2023-08-15 NOTE — PROGRESS NOTE ADULT - SUBJECTIVE AND OBJECTIVE BOX
SURGERY DAILY PROGRESS NOTE:     Subjective:  Patient seen and examined at bedside during am rounds. Continues to have discomfort of LUE but improving. Swelling is slighlty worst than yesterday. Hydrating himself PO. Mentioned one episode of vomiting in the afternoon and had nausea afterward which now resolved.   AVSS. Denies any fevers, chills, n/v/d, chest pain or shortness of breath    Objective:      ICU Vital Signs Last 24 Hrs  T(C): 36.9 (14 Aug 2023 20:42), Max: 37 (14 Aug 2023 17:54)  T(F): 98.4 (14 Aug 2023 20:42), Max: 98.6 (14 Aug 2023 17:54)  HR: 62 (15 Aug 2023 04:00) (56 - 81)  BP: 136/73 (15 Aug 2023 02:00) (120/63 - 140/68)  BP(mean): 92 (15 Aug 2023 02:00) (79 - 92)  ABP: --  ABP(mean): --  RR: --  SpO2: 96% (15 Aug 2023 04:00) (94% - 100%)    O2 Parameters below as of 14 Aug 2023 08:00  Patient On (Oxygen Delivery Method): room air        --------------------------------------------------------  I&O's Summary    13 Aug 2023 07:01  -  14 Aug 2023 07:00  --------------------------------------------------------  IN: 4245 mL / OUT: 3850 mL / NET: 395 mL    14 Aug 2023 07:01  -  15 Aug 2023 05:53  --------------------------------------------------------  IN: 1639 mL / OUT: 2500 mL / NET: -861 mL                    PHYSICAL EXAM   Gen: well-appearing, in no acute distress  CV: pulse regularly present   Resp: airway patent, non-labored breathing  Abd: soft, non tender, non distened  Extremities: left hand swelling improved, minimal erythema, mild ecchymosis in antecubital fossa non tender, capillary refill <2 sec, full ROM intact. Radial pulse +2         @ 16:58                    -  CBC: ->)-------(<-                     -                 146 | 118 | 32    CMP:  ----------------------< 85               3.8 | 24 | 3.29                      Ca:7.8  Phos:-  Mg:-               -|      |-        LFTs:  ------|-|-----             -|      |-   @ 06:24                    12.0  CBC: 9.65>)-------(<157                     35.2                 143 | 112 | 34    CMP:  ----------------------< 95               4.2 | 25 | 3.45                      Ca:8.5  Phos:4.2  M.3               0.3|      |95        LFTs:  ------|50|-----             -|      |-   @ 23:42                    -  CBC: ->)-------(<-                     -                 141 | 113 | 36    CMP:  ----------------------< 106               4.2 | 25 | 3.32                      Ca:8.5  Phos:-  Mg:-               -|      |-        LFTs:  ------|-|-----             -|      |-      Culture - Urine (collected 08-10-23 @ 17:46)  Source: Clean Catch None  Final Report (23 @ 22:19):    No growth      Current Inpatient Medications:  acetaminophen     Tablet .. 975 milliGRAM(s) Oral every 8 hours PRN  aluminum hydroxide/magnesium hydroxide/simethicone Suspension 30 milliLiter(s) Oral every 4 hours PRN  cefTRIAXone Injectable. 1000 milliGRAM(s) IV Push every 24 hours  heparin   Injectable 6500 Unit(s) IV Push every 6 hours PRN  heparin   Injectable 3000 Unit(s) IV Push every 6 hours PRN  heparin  Infusion. 900 Unit(s)/Hr (9 mL/Hr) IV Continuous <Continuous>  melatonin 3 milliGRAM(s) Oral at bedtime PRN  ondansetron Injectable 4 milliGRAM(s) IV Push every 8 hours PRN  oxyCODONE    IR 5 milliGRAM(s) Oral every 4 hours PRN  senna 2 Tablet(s) Oral at bedtime  sodium chloride 0.9%. 1000 milliLiter(s) (175 mL/Hr) IV Continuous <Continuous>

## 2023-08-15 NOTE — PROGRESS NOTE ADULT - SUBJECTIVE AND OBJECTIVE BOX
Subjective:  Pt seen, c/o nausea but abd discomfort improved.    Vital Signs:  Vital Signs Last 24 Hrs  T(C): 36.8 (08-15-23 @ 09:02), Max: 37.3 (08-15-23 @ 06:00)  T(F): 98.3 (08-15-23 @ 09:02), Max: 99.2 (08-15-23 @ 06:00)  HR: 74 (08-15-23 @ 10:25) (57 - 81)  BP: 129/62 (08-15-23 @ 10:25) (129/62 - 147/70)  RR: 15 (08-15-23 @ 10:25) (15 - 15)  SpO2: 99% (08-15-23 @ 10:25) (94% - 100%) on (O2)    Telemetry/Alarms:    Relevant labs, radiology and Medications reviewed                        11.8   7.32  )-----------( 152      ( 15 Aug 2023 07:48 )             35.0     08-15    141  |  113<H>  |  25<H>  ----------------------------<  95  4.3   |  22  |  3.31<H>    Ca    8.6      15 Aug 2023 07:48  Phos  3.9     08-15  Mg     2.3     08-15    TPro  6.3  /  Alb  3.1<L>  /  TBili  0.5  /  DBili  0.1  /  AST  53<H>  /  ALT  94<H>  /  AlkPhos  52  08-15    PT/INR - ( 15 Aug 2023 07:48 )   PT: 12.5 sec;   INR: 1.11 ratio         PTT - ( 15 Aug 2023 07:48 )  PTT:73.9 sec  MEDICATIONS  (STANDING):  heparin  Infusion. 1500 Unit(s)/Hr (15 mL/Hr) IV Continuous <Continuous>  pantoprazole  Injectable 40 milliGRAM(s) IV Push daily  senna 2 Tablet(s) Oral at bedtime  sodium chloride 0.45%. 1000 milliLiter(s) (150 mL/Hr) IV Continuous <Continuous>    MEDICATIONS  (PRN):  acetaminophen     Tablet .. 975 milliGRAM(s) Oral every 8 hours PRN Temp greater or equal to 38C (100.4F), Mild Pain (1 - 3)  acetaminophen   IVPB .. 1000 milliGRAM(s) IV Intermittent once PRN Mild Pain (1 - 3)  aluminum hydroxide/magnesium hydroxide/simethicone Suspension 30 milliLiter(s) Oral every 4 hours PRN Dyspepsia  bisacodyl 5 milliGRAM(s) Oral every 12 hours PRN Constipation  melatonin 3 milliGRAM(s) Oral at bedtime PRN Insomnia  oxyCODONE    IR 5 milliGRAM(s) Oral every 4 hours PRN Moderate Pain (4 - 6)  prochlorperazine   Injectable 5 milliGRAM(s) IV Push every 6 hours PRN nausea/vomiting      Physical exam  General: NAD                                                         Neuro: A+O x 3, non-focal, speech clear and intact                     ENT: Normal exam of nasal/oral mucosa with absence of cyanosis.   Neck: supple, no JVD, trachea midline   Chest:  equal expansion bilaterally,  normal excursion, no accessory muscle use note  CV: RRR,   GI: soft, NT, ND,   Extremities: warm, LLE swelling arm, + radial pulse  SKIN: warm, dry, intact     I&O's Summary    14 Aug 2023 07:01  -  15 Aug 2023 07:00  --------------------------------------------------------  IN: 1639 mL / OUT: 3200 mL / NET: -1561 mL        Assessment  27y Male  w/ PAST MEDICAL & SURGICAL HISTORY:  No pertinent past medical history      No significant past surgical history      admitted with complaints of Patient is a 27y old  Male who presents with a chief complaint of Acute Left Upper Ext DVT (15 Aug 2023 10:35)  .  27y Male  presenting with LUE Axillary vein and subclavian vein DVT with Thoracic outlet syndrome. s/p LUE thrombectomy on 8/9 in IR. Post op course complicated by recurrence of DVT in the axillary subclavian and basilic veins in the immediate postop period. S/p angiojet thrombectomy of LUE 8/10. Now on heparin gtt. Called for evaluation for 1st rib resection.    First rib resection scheduled for Thursday, 8/17.   cont full dose anticoagulation  tylenol and oxy IR as needed for discomfort  d/w pt and mom at bedside  OOB  trend renal function, cont fluids as per renal  preop labs ordered for AM    Discussed with Cardiothoracic Team at AM rounds.

## 2023-08-15 NOTE — PROGRESS NOTE ADULT - ASSESSMENT
ASSESSMENT  26 yo Male with no PMHx presented c/o left arm swelling and pain with purple discoloration that started yesterday. No fever or chills, No ho trauma. No recent travel. As per EMR pt working out a lot, very active lifestyle, works as .     US doppler revealing L axillary vein thrombosis. CTA neg for PE  MR chest with segmental acute thrombosis subclavian and axillary veins with evidence L sided thoracic outlet syndrome    s/p thrombectomy of subclavian and axillary vein 8/9  Post op course b/l recurrence of LUE clot. and worsening CORNELIO    s/p thrombectomy of subclavian to basilic vein 8/10    PLAN  Neuro: mentation intact. pain control prn. no NSAIDs. IV tylenol, dilaudid   Pulm: on room air.   GI: PO diet as tolerated. cont bowel regimen prn. added dulcolax and PPI  Neprho: CORNELIO likely contrast induced and poor PO intake. Cr unchanged from yesterday. will cont to trend. cont 1/2 NS @ 125 and encourage more PO intake. US kidney doppler neg for renal vein thrombosis. US abd with no acute pathology  ID:  no abx indicated at this time. urine cx neg. afebrile. moniotr temps  Heme: cont IV heparin gtt. elevate LUE. SCDs. plan for 1st rib removal for thoracic outlet syndrome on 8/17    Dispo: Trend renal fxn. IV fluids. IV heparin gtt. plan for 1st rib removal for thoracic outlet syndrome on 8/17    WIll discuss with Dr. Harrell

## 2023-08-15 NOTE — PROGRESS NOTE ADULT - SUBJECTIVE AND OBJECTIVE BOX
27 year old with no past medical hx of clots presenting w acute LUE swelling found w  Acute Thrombis LUE and MRI suspicious for thoracic outlet syndrome   pt required acute clot thrombolysis w high clot load ~ 280 cc of clot , pt states she was urinating dark after procedure . pt states he is now urinating clear urine.  Denies any flank pains. pt also had multiple contrast load  with CTA chest and  venograms     8/15   feeling better - less nausea    no sob or cp    attempting to eat light meals today    urinating well        8/14    seen w parents at bedside   pt c/o abd pains diffuse, nonspecific    assoc w nausea and vomiting   no sob or cp    urinating well          PAST MEDICAL & SURGICAL HISTORY:  No pertinent past medical history    No significant past surgical history    MEDICATIONS  (STANDING):  heparin  Infusion. 1500 Unit(s)/Hr (15 mL/Hr) IV Continuous <Continuous>  pantoprazole  Injectable 40 milliGRAM(s) IV Push daily  senna 2 Tablet(s) Oral at bedtime  sodium chloride 0.45%. 1000 milliLiter(s) (150 mL/Hr) IV Continuous <Continuous>      Allergies    No Known Allergies    Intolerances      Vital Signs Last 24 Hrs  T(C): 36.8 (15 Aug 2023 09:02), Max: 37.3 (15 Aug 2023 06:00)  T(F): 98.3 (15 Aug 2023 09:02), Max: 99.2 (15 Aug 2023 06:00)  HR: 71 (15 Aug 2023 13:20) (57 - 81)  BP: 137/77 (15 Aug 2023 13:20) (129/62 - 147/70)  BP(mean): 94 (15 Aug 2023 13:20) (82 - 94)  RR: 15 (15 Aug 2023 10:25) (15 - 15)  SpO2: 100% (15 Aug 2023 13:20) (94% - 100%)    Parameters below as of 15 Aug 2023 13:20  Patient On (Oxygen Delivery Method): room air    I&O's Detail    14 Aug 2023 07:01  -  15 Aug 2023 07:00  --------------------------------------------------------  IN:    Heparin Infusion: 164 mL    Oral Fluid: 100 mL    sodium chloride 0.45%: 1375 mL  Total IN: 1639 mL    OUT:    Voided (mL): 3200 mL  Total OUT: 3200 mL    Total NET: -1561 mL      PHYSICAL EXAM:    Constitutional: NAD  HEENT: , EOMI,  MM  Neck: No LAD, No JVD  Respiratory: CTAB  Cardiovascular: S1 and S2  Gastrointestinal: BS+, soft, NT/ND   Extremities: No peripheral edema, LUE edema mild , move all ext   Neurological: A/O x 3, no focal deficits  : No Damico  Skin: No rashes  Access: Not applicable    LABS:                          11.8   7.32  )-----------( 152      ( 15 Aug 2023 07:48 )             35.0                         11.0   7.26  )-----------( 133      ( 15 Aug 2023 01:57 )             31.7         141    |  113    |  25     ----------------------------<  95        15 Aug 2023 07:48  4.3     |  22     |  3.31     142    |  113    |  27     ----------------------------<  93        14 Aug 2023 05:45  4.2     |  23     |  3.32     141    |  112    |  29     ----------------------------<  94        13 Aug 2023 19:41  4.1     |  24     |  3.46     Ca    8.6        15 Aug 2023 07:48  Ca    8.3        14 Aug 2023 05:45    Phos  3.9       15 Aug 2023 07:48  Phos  3.7       14 Aug 2023 05:45    Mg     2.3       15 Aug 2023 07:48  Mg     2.1       14 Aug 2023 05:45    TPro  6.3    /  Alb  3.1    /  TBili  0.5    /        15 Aug 2023 07:48  DBili  0.1    /  AST  53     /  ALT  94     /  AlkPhos  52       TPro  6.1    /  Alb  2.9    /  TBili  0.4    /        14 Aug 2023 05:45  DBili  0.1    /  AST  48     /  ALT  100    /  AlkPhos  48             Urine Microscopic-Add On (NC) (08.10.23 @ 17:46)   Red Blood Cell - Urine: 6-10 /HPF  White Blood Cell - Urine: 0-2 /HPF  Bacteria: Moderate  Comment - Urine: moderate hyphae-like cells seen  Squamous Epithelial Cells: Negative    Urinalysis (08.10.23 @ 17:46)   Glucose Qualitative, Urine: Negative  Blood, Urine: Large  pH Urine: 8.0  Color: Brown  Urine Appearance: Clear  Bilirubin: Negative  Ketone - Urine: Trace  Specific Gravity: 1.010  Protein, Urine: 100  Urobilinogen: Negative  Nitrite: Positive  Leukocyte Esterase Concentration: Trace          RADIOLOGY & ADDITIONAL STUDIES:

## 2023-08-15 NOTE — PROGRESS NOTE ADULT - ASSESSMENT
27yoM presenting with LUE Axillary vein and subclavian vein DVT. Thoracic outlet syndrome   s/p LUE thrombectomy on 8/9 . Post op course complicated by recurrence of DVT in the axillary subclavian and basilic veins in the immediate postop s/p angiojet thrombectomy of LUE 8/10  CORNELIO, Cr uptrending. Nephrology on board  US abd yesterday for abdominal pain showed ascites and b/L pleural eff and US doppler: neg. KUB done to r/ ileus, read pending        Plan:  Pain control PRN  Continue AC with hep drip, currently at 15 ml/h therapeutic x1  Continue Arm elevation; ACE wrap PRN  1st rib resection on 8/17 by cardiothoracic surgery vs possible transfer. Pending discussion with family today  Started on PPI  Nephrology on board evaluation of CORNELIO  C/w bowel regimen, no BM for 2 days  ICU team on board for medical management  Daily labs    Will discuss plan with Dr. Kim

## 2023-08-15 NOTE — PROGRESS NOTE ADULT - SUBJECTIVE AND OBJECTIVE BOX
Patient is a 27y old  Male who presents with a chief complaint of Acute Left Upper Ext DVT (14 Aug 2023 09:40)    BRIEF HOSPITAL COURSE: 28 yo Male with no PMHx presented c/o left arm swelling and pain with purple discoloration that started yesterday. No fever or chills, No ho trauma. No recent travel. As per EMR pt working out a lot, very active lifestyle, works as .   Found to have L axillary vein thrombosis  CTA neg for PE  MR chest with segmental acute thrombosis subclavian and axillary veins with evidence L sided thoracic outlet syndrome    s/p thrombectomy of subclavian and axillary vein 8/9  Post op course b/l recurrence of LUE clot. and worsening CORNELIO    s/p thrombectomy of subclavian to basilic vein 8/10    8/14 c/o feeling nauseous still with constipation. Poor PO intake. states abd pain is in LUQ, non tender to palpation. denies dizziness. last BM yesterday - described as hard stools. states he is voiding.   8/15 had BM this am, nausea better today. ambulated in hallway.     PAST MEDICAL & SURGICAL HISTORY:  No pertinent past medical history  No significant past surgical history      MEDICATIONS  (STANDING):  heparin  Infusion. 1500 Unit(s)/Hr (15 mL/Hr) IV Continuous <Continuous>  pantoprazole  Injectable 40 milliGRAM(s) IV Push daily  senna 2 Tablet(s) Oral at bedtime  sodium chloride 0.45%. 1000 milliLiter(s) (150 mL/Hr) IV Continuous <Continuous>      Vital Signs Last 24 Hrs  T(C): 36.8 (15 Aug 2023 09:02), Max: 37.3 (15 Aug 2023 06:00)  T(F): 98.3 (15 Aug 2023 09:02), Max: 99.2 (15 Aug 2023 06:00)  HR: 74 (15 Aug 2023 10:25) (57 - 81)  BP: 129/62 (15 Aug 2023 10:25) (129/62 - 147/70)  BP(mean): 82 (15 Aug 2023 10:25) (82 - 92)  RR: 15 (15 Aug 2023 10:25) (15 - 15)  SpO2: 99% (15 Aug 2023 10:25) (94% - 100%)    Parameters below as of 15 Aug 2023 10:25  Patient On (Oxygen Delivery Method): room air      I&O's Detail    14 Aug 2023 07:01  -  15 Aug 2023 07:00  --------------------------------------------------------  IN:    Heparin Infusion: 164 mL    Oral Fluid: 100 mL    sodium chloride 0.45%: 1375 mL  Total IN: 1639 mL    OUT:    Voided (mL): 3200 mL  Total OUT: 3200 mL    Total NET: -1561 mL      LABS:                        11.8   7.32  )-----------( 152      ( 15 Aug 2023 07:48 )             35.0     08-15    141  |  113<H>  |  25<H>  ----------------------------<  95  4.3   |  22  |  3.31<H>    Ca    8.6      15 Aug 2023 07:48  Phos  3.9     08-15  Mg     2.3     08-15    TPro  6.3  /  Alb  3.1<L>  /  TBili  0.5  /  DBili  0.1  /  AST  53<H>  /  ALT  94<H>  /  AlkPhos  52  08-15    LIVER FUNCTIONS - ( 15 Aug 2023 07:48 )  Alb: 3.1 g/dL / Pro: 6.3 gm/dL / ALK PHOS: 52 U/L / ALT: 94 U/L / AST: 53 U/L / GGT: x           PT/INR - ( 15 Aug 2023 07:48 )   PT: 12.5 sec;   INR: 1.11 ratio      PTT - ( 15 Aug 2023 07:48 )  PTT:73.9 sec  Urinalysis Basic - ( 15 Aug 2023 07:48 )    Color: x / Appearance: x / SG: x / pH: x  Gluc: 95 mg/dL / Ketone: x  / Bili: x / Urobili: x   Blood: x / Protein: x / Nitrite: x   Leuk Esterase: x / RBC: x / WBC x   Sq Epi: x / Non Sq Epi: x / Bacteria: x      CULTURES:  Culture Results:   No growth (08-10 @ 17:46)      Physical Examination:    General: No acute distress.    PULM: Clear to auscultion bilaterally  CVS: Regular rate and rhythm, no murmurs  ABD: Soft, nondistended, nontender, normoactive bowel sound  EXT: LUE with mild edema, non tender  SKIN: Warm and well perfused, no rashes noted.  NEURO: Alert, oriented, interactive    DEVICES:   RUE midline    RADIOLOGY:   < from: US Duplex Venous Upper Ext Ltd, Left (08.09.23 @ 18:56) >  IMPRESSION:  Acute, occlusive deep venous thrombosis in the left subclavian and   axillary veins.  Acute, occlusive superficial venous thrombosis in the left basilic vein.  I discussed the findings with  on 08/09/2023 at 7:05 PM.  Read   back verification was obtained.    < end of copied text >      < from: US Duplex Kidneys (08.12.23 @ 08:28) >  IMPRESSION:    No evidence of renal vein thrombosis.    < end of copied text >    < from: US Kidney and Bladder (08.10.23 @ 16:57) >    IMPRESSION:  No hydronephrosis or renal calculus.    Underdistended urinary bladder, limiting evaluation with wall thickening   which may be secondary to underdistention.    < end of copied text >

## 2023-08-16 LAB
ANION GAP SERPL CALC-SCNC: 4 MMOL/L — LOW (ref 5–17)
APTT BLD: 72.1 SEC — HIGH (ref 24.5–35.6)
BLD GP AB SCN SERPL QL: SIGNIFICANT CHANGE UP
BUN SERPL-MCNC: 24 MG/DL — HIGH (ref 7–23)
CALCIUM SERPL-MCNC: 9.1 MG/DL — SIGNIFICANT CHANGE UP (ref 8.5–10.1)
CHLORIDE SERPL-SCNC: 113 MMOL/L — HIGH (ref 96–108)
CO2 SERPL-SCNC: 24 MMOL/L — SIGNIFICANT CHANGE UP (ref 22–31)
CREAT SERPL-MCNC: 3.24 MG/DL — HIGH (ref 0.5–1.3)
EGFR: 26 ML/MIN/1.73M2 — LOW
GLUCOSE SERPL-MCNC: 91 MG/DL — SIGNIFICANT CHANGE UP (ref 70–99)
HCT VFR BLD CALC: 35.8 % — LOW (ref 39–50)
HGB BLD-MCNC: 12.4 G/DL — LOW (ref 13–17)
INR BLD: 1.08 RATIO — SIGNIFICANT CHANGE UP (ref 0.85–1.18)
MAGNESIUM SERPL-MCNC: 2.3 MG/DL — SIGNIFICANT CHANGE UP (ref 1.6–2.6)
MCHC RBC-ENTMCNC: 29.7 PG — SIGNIFICANT CHANGE UP (ref 27–34)
MCHC RBC-ENTMCNC: 34.6 GM/DL — SIGNIFICANT CHANGE UP (ref 32–36)
MCV RBC AUTO: 85.9 FL — SIGNIFICANT CHANGE UP (ref 80–100)
PHOSPHATE SERPL-MCNC: 4 MG/DL — SIGNIFICANT CHANGE UP (ref 2.5–4.5)
PLATELET # BLD AUTO: 158 K/UL — SIGNIFICANT CHANGE UP (ref 150–400)
POTASSIUM SERPL-MCNC: 4.1 MMOL/L — SIGNIFICANT CHANGE UP (ref 3.5–5.3)
POTASSIUM SERPL-SCNC: 4.1 MMOL/L — SIGNIFICANT CHANGE UP (ref 3.5–5.3)
PROTHROM AB SERPL-ACNC: 12.2 SEC — SIGNIFICANT CHANGE UP (ref 9.5–13)
RBC # BLD: 4.17 M/UL — LOW (ref 4.2–5.8)
RBC # FLD: 11.9 % — SIGNIFICANT CHANGE UP (ref 10.3–14.5)
SODIUM SERPL-SCNC: 141 MMOL/L — SIGNIFICANT CHANGE UP (ref 135–145)
WBC # BLD: 7.25 K/UL — SIGNIFICANT CHANGE UP (ref 3.8–10.5)
WBC # FLD AUTO: 7.25 K/UL — SIGNIFICANT CHANGE UP (ref 3.8–10.5)

## 2023-08-16 PROCEDURE — 99231 SBSQ HOSP IP/OBS SF/LOW 25: CPT

## 2023-08-16 PROCEDURE — 99232 SBSQ HOSP IP/OBS MODERATE 35: CPT

## 2023-08-16 RX ORDER — ACETAMINOPHEN 500 MG
1000 TABLET ORAL ONCE
Refills: 0 | Status: COMPLETED | OUTPATIENT
Start: 2023-08-16 | End: 2023-08-16

## 2023-08-16 RX ORDER — ACETAMINOPHEN 500 MG
1000 TABLET ORAL ONCE
Refills: 0 | Status: DISCONTINUED | OUTPATIENT
Start: 2023-08-16 | End: 2023-08-20

## 2023-08-16 RX ADMIN — Medication 5 MILLIGRAM(S): at 00:52

## 2023-08-16 RX ADMIN — Medication 400 MILLIGRAM(S): at 01:18

## 2023-08-16 RX ADMIN — Medication 5 MILLIGRAM(S): at 06:42

## 2023-08-16 RX ADMIN — HEPARIN SODIUM 1500 UNIT(S)/HR: 5000 INJECTION INTRAVENOUS; SUBCUTANEOUS at 19:25

## 2023-08-16 RX ADMIN — Medication 975 MILLIGRAM(S): at 19:32

## 2023-08-16 RX ADMIN — Medication 1000 MILLIGRAM(S): at 01:33

## 2023-08-16 RX ADMIN — Medication 975 MILLIGRAM(S): at 18:42

## 2023-08-16 RX ADMIN — HEPARIN SODIUM 1500 UNIT(S)/HR: 5000 INJECTION INTRAVENOUS; SUBCUTANEOUS at 15:25

## 2023-08-16 RX ADMIN — PANTOPRAZOLE SODIUM 40 MILLIGRAM(S): 20 TABLET, DELAYED RELEASE ORAL at 08:59

## 2023-08-16 RX ADMIN — Medication 5 MILLIGRAM(S): at 18:50

## 2023-08-16 RX ADMIN — HEPARIN SODIUM 1500 UNIT(S)/HR: 5000 INJECTION INTRAVENOUS; SUBCUTANEOUS at 07:17

## 2023-08-16 NOTE — PROGRESS NOTE ADULT - ASSESSMENT
ASSESSMENT  28 yo Male with no PMHx presented c/o left arm swelling and pain with purple discoloration that started yesterday. No fever or chills, No ho trauma. No recent travel. As per EMR pt working out a lot, very active lifestyle, works as .     US doppler revealing L axillary vein thrombosis. CTA neg for PE  MR chest with segmental acute thrombosis subclavian and axillary veins with evidence L sided thoracic outlet syndrome    s/p thrombectomy of subclavian and axillary vein 8/9  Post op course b/l recurrence of LUE clot. and worsening CORNELIO    s/p thrombectomy of subclavian to basilic vein 8/10    PLAN  Neuro: mentation intact. pain control prn. no NSAIDs. IV tylenol, dilaudid   Pulm: on room air.   GI: PO diet as tolerated. cont bowel regimen prn. NPO after MN  Nephro: CORNELIO likely contrast induced and poor PO intake. Cr slight improvement from yesterday. cont 1/2 NS @ 125 and encourage more PO intake.   ID: no abx indicated at this time. urine cx neg. afebrile. moniotr temps  Heme: cont IV heparin gtt. elevate LUE. SCDs. plan for 1st rib removal for thoracic outlet syndrome on 8/17 11am, plan to stop heparin gtt 6am tomorrow.     Dispo: Trend renal fxn. IV fluids. IV heparin gtt. plan for 1st rib removal for thoracic outlet syndrome on 8/17    discussed with Dr. Harrell and nephrology

## 2023-08-16 NOTE — PROGRESS NOTE ADULT - SUBJECTIVE AND OBJECTIVE BOX
27 year old with no past medical hx of clots presenting w acute LUE swelling found w  Acute Thrombis LUE and MRI suspicious for thoracic outlet syndrome   pt required acute clot thrombolysis w high clot load ~ 280 cc of clot , pt states she was urinating dark after procedure . pt states he is now urinating clear urine.  Denies any flank pains. pt also had multiple contrast load  with CTA chest and  venograms      feeling better - less nausea    no sob or cp    attempting to eat light meals today    urinating well        PAST MEDICAL & SURGICAL HISTORY:  No pertinent past medical history    No significant past surgical history    MEDICATIONS  (STANDING):  MEDICATIONS  (STANDING):  heparin  Infusion. 1500 Unit(s)/Hr (15 mL/Hr) IV Continuous <Continuous>  pantoprazole  Injectable 40 milliGRAM(s) IV Push daily  senna 2 Tablet(s) Oral at bedtime  sodium chloride 0.45%. 1000 milliLiter(s) (150 mL/Hr) IV Continuous <Continuous>      Allergies    No Known Allergies    Intolerances      MEDICATIONS  (STANDING):  heparin  Infusion. 1500 Unit(s)/Hr (15 mL/Hr) IV Continuous <Continuous>  pantoprazole  Injectable 40 milliGRAM(s) IV Push daily  senna 2 Tablet(s) Oral at bedtime  sodium chloride 0.45%. 1000 milliLiter(s) (150 mL/Hr) IV Continuous <Continuous>    Vital Signs Last 24 Hrs  T(C): 36.7 (16 Aug 2023 21:30), Max: 37.4 (16 Aug 2023 18:12)  T(F): 98.1 (16 Aug 2023 21:30), Max: 99.3 (16 Aug 2023 18:12)  HR: 53 (16 Aug 2023 20:00) (49 - 90)  BP: 132/70 (16 Aug 2023 20:00) (132/70 - 150/79)  BP(mean): 88 (16 Aug 2023 20:00) (87 - 100)  RR: --  SpO2: 98% (16 Aug 2023 20:00) (97% - 100%)    Parameters below as of 16 Aug 2023 20:00  Patient On (Oxygen Delivery Method): room air      I&O's Detail    15 Aug 2023 07:01  -  16 Aug 2023 07:00  --------------------------------------------------------  IN:  Total IN: 0 mL    OUT:    Voided (mL): 3700 mL  Total OUT: 3700 mL    Total NET: -3700 mL      16 Aug 2023 07:01  -  16 Aug 2023 22:18  --------------------------------------------------------  IN:  Total IN: 0 mL    OUT:    Voided (mL): 1925 mL  Total OUT: 1925 mL    Total NET: -1925 mL          PHYSICAL EXAM:    Constitutional: NAD  HEENT: , EOMI,  MM  Neck: No LAD, No JVD  Respiratory: CTAB  Cardiovascular: S1 and S2  Gastrointestinal: BS+, soft, NT/ND   Extremities: No peripheral edema, LUE edema mild , move all ext   Neurological: A/O x 3, no focal deficits  : No Damico  Skin: No rashes  Access: Not applicable    LABS:                                            12.4   7.25  )-----------( 158      ( 16 Aug 2023 06:02 )             35.8                         11.8   7.32  )-----------( 152      ( 15 Aug 2023 07:48 )             35.0       141    |  113    |  24     ----------------------------<  91        16 Aug 2023 06:02  4.1     |  24     |  3.24     141    |  113    |  25     ----------------------------<  95        15 Aug 2023 07:48  4.3     |  22     |  3.31     142    |  113    |  27     ----------------------------<  93        14 Aug 2023 05:45  4.2     |  23     |  3.32     Ca    9.1        16 Aug 2023 06:02  Ca    8.6        15 Aug 2023 07:48    Phos  4.0       16 Aug 2023 06:02  Phos  3.9       15 Aug 2023 07:48    Mg     2.3       16 Aug 2023 06:02  Mg     2.3       15 Aug 2023 07:48    TPro  6.3    /  Alb  3.1    /  TBili  0.5    /        15 Aug 2023 07:48  DBili  0.1    /  AST  53     /  ALT  94     /  AlkPhos  52       TPro  6.1    /  Alb  2.9    /  TBili  0.4    /        14 Aug 2023 05:45  DBili  0.1    /  AST  48     /  ALT  100    /  AlkPhos  48                  Urine Microscopic-Add On (NC) (08.10.23 @ 17:46)   Red Blood Cell - Urine: 6-10 /HPF  White Blood Cell - Urine: 0-2 /HPF  Bacteria: Moderate  Comment - Urine: moderate hyphae-like cells seen  Squamous Epithelial Cells: Negative    Urinalysis (08.10.23 @ 17:46)   Glucose Qualitative, Urine: Negative  Blood, Urine: Large  pH Urine: 8.0  Color: Brown  Urine Appearance: Clear  Bilirubin: Negative  Ketone - Urine: Trace  Specific Gravity: 1.010  Protein, Urine: 100  Urobilinogen: Negative  Nitrite: Positive  Leukocyte Esterase Concentration: Trace          RADIOLOGY & ADDITIONAL STUDIES:

## 2023-08-16 NOTE — PROGRESS NOTE ADULT - ASSESSMENT
27yoM presenting with LUE Axillary vein and subclavian vein DVT. Thoracic outlet syndrome   s/p LUE thrombectomy on 8/9 . Post op course complicated by recurrence of DVT in the axillary subclavian and basilic veins in the immediate postop s/p angiojet thrombectomy of LUE 8/10  CORNELIO, Cr uptrending. Nephrology on board  US abd for abdominal pain showed ascites and b/L pleural eff and US doppler: neg. KUB done to r/ ileus, read pending        Plan:  Pain control PRN  Continue AC with hep drip, currently at 15 ml/h therapeutic x3  Continue Arm elevation; ACE wrap PRN  1st rib resection on 8/17 by cardiothoracic surgery  Started on PPI  Nephrology on board evaluation of CORNELIO  C/w bowel regimen, had one BM yesterday  ICU team on board for medical management  Daily labs    Will discuss plan with Dr. Kim

## 2023-08-16 NOTE — PROGRESS NOTE ADULT - SUBJECTIVE AND OBJECTIVE BOX
Patient is a 27y old  Male who presents with a chief complaint of Acute Left Upper Ext DVT (14 Aug 2023 09:40)    BRIEF HOSPITAL COURSE: 26 yo Male with no PMHx presented c/o left arm swelling and pain with purple discoloration that started yesterday. No fever or chills, No ho trauma. No recent travel. As per EMR pt working out a lot, very active lifestyle, works as .   Found to have L axillary vein thrombosis  CTA neg for PE  MR chest with segmental acute thrombosis subclavian and axillary veins with evidence L sided thoracic outlet syndrome    s/p thrombectomy of subclavian and axillary vein 8/9  Post op course b/l recurrence of LUE clot. and worsening CORNELIO    s/p thrombectomy of subclavian to basilic vein 8/10    8/14 c/o feeling nauseous still with constipation. Poor PO intake. states abd pain is in LUQ, non tender to palpation. denies dizziness. last BM yesterday - described as hard stools. states he is voiding.   8/15 had BM this am, nausea better today. ambulated in hallway.   8/16 pt c/o pain at IV sites on RUE. ambulating. nausea improving    PAST MEDICAL & SURGICAL HISTORY:  No pertinent past medical history  No significant past surgical history      MEDICATIONS  (STANDING):  heparin  Infusion. 1500 Unit(s)/Hr (15 mL/Hr) IV Continuous <Continuous>  pantoprazole  Injectable 40 milliGRAM(s) IV Push daily  senna 2 Tablet(s) Oral at bedtime  sodium chloride 0.45%. 1000 milliLiter(s) (150 mL/Hr) IV Continuous <Continuous>      Vital Signs Last 24 Hrs  T(C): 37.1 (16 Aug 2023 08:17), Max: 37.1 (15 Aug 2023 15:27)  T(F): 98.8 (16 Aug 2023 08:17), Max: 98.8 (16 Aug 2023 08:17)  HR: 52 (16 Aug 2023 10:00) (49 - 90)  BP: 138/70 (16 Aug 2023 10:00) (134/80 - 150/79)  BP(mean): 87 (16 Aug 2023 10:00) (87 - 106)  RR: 18 (15 Aug 2023 18:23) (18 - 18)  SpO2: 100% (16 Aug 2023 10:00) (97% - 100%)    Parameters below as of 16 Aug 2023 08:14  Patient On (Oxygen Delivery Method): room air      I&O's Detail    15 Aug 2023 07:01  -  16 Aug 2023 07:00  --------------------------------------------------------  IN:  Total IN: 0 mL    OUT:    Voided (mL): 3700 mL  Total OUT: 3700 mL    Total NET: -3700 mL      LABS:                        12.4   7.25  )-----------( 158      ( 16 Aug 2023 06:02 )             35.8     08-16    141  |  113<H>  |  24<H>  ----------------------------<  91  4.1   |  24  |  3.24<H>    Ca    9.1      16 Aug 2023 06:02  Phos  4.0     08-16  Mg     2.3     08-16    TPro  6.3  /  Alb  3.1<L>  /  TBili  0.5  /  DBili  0.1  /  AST  53<H>  /  ALT  94<H>  /  AlkPhos  52  08-15    LIVER FUNCTIONS - ( 15 Aug 2023 07:48 )  Alb: 3.1 g/dL / Pro: 6.3 gm/dL / ALK PHOS: 52 U/L / ALT: 94 U/L / AST: 53 U/L / GGT: x           PT/INR - ( 16 Aug 2023 06:02 )   PT: 12.2 sec;   INR: 1.08 ratio    PTT - ( 16 Aug 2023 06:02 )  PTT:72.1 sec  Urinalysis Basic - ( 16 Aug 2023 06:02 )    Color: x / Appearance: x / SG: x / pH: x  Gluc: 91 mg/dL / Ketone: x  / Bili: x / Urobili: x   Blood: x / Protein: x / Nitrite: x   Leuk Esterase: x / RBC: x / WBC x   Sq Epi: x / Non Sq Epi: x / Bacteria: x      CULTURES:  Culture Results:   No growth (08-10 @ 17:46)      Physical Examination:    General: No acute distress.    PULM: Clear to auscultion bilaterally  CVS: Regular rate and rhythm, no murmurs  ABD: Soft, nondistended, nontender, normoactive bowel sound  EXT: LUE with mild edema, non tender  SKIN: Warm and well perfused, no rashes noted.  NEURO: Alert, oriented, interactive    DEVICES:   RUE midline    RADIOLOGY:   < from: US Duplex Venous Upper Ext Ltd, Left (08.09.23 @ 18:56) >  IMPRESSION:  Acute, occlusive deep venous thrombosis in the left subclavian and   axillary veins.  Acute, occlusive superficial venous thrombosis in the left basilic vein.  I discussed the findings with  on 08/09/2023 at 7:05 PM.  Read   back verification was obtained.    < end of copied text >      < from: US Duplex Kidneys (08.12.23 @ 08:28) >  IMPRESSION:    No evidence of renal vein thrombosis.    < end of copied text >    < from: US Kidney and Bladder (08.10.23 @ 16:57) >    IMPRESSION:  No hydronephrosis or renal calculus.    Underdistended urinary bladder, limiting evaluation with wall thickening   which may be secondary to underdistention.    < end of copied text >

## 2023-08-16 NOTE — PROGRESS NOTE ADULT - SUBJECTIVE AND OBJECTIVE BOX
Subjective:  Pt seen, no new complaints. Continued numbness of middle right finger.     Vital Signs:  Vital Signs Last 24 Hrs  T(C): 37.1 (08-16-23 @ 08:17), Max: 37.1 (08-15-23 @ 15:27)  T(F): 98.8 (08-16-23 @ 08:17), Max: 98.8 (08-16-23 @ 08:17)  HR: 64 (08-16-23 @ 08:14) (49 - 90)  BP: 138/81 (08-16-23 @ 08:14) (129/62 - 150/79)  RR: 18 (08-15-23 @ 18:23) (15 - 18)  SpO2: 100% (08-16-23 @ 08:14) (97% - 100%) on (O2)    Telemetry/Alarms:    Relevant labs, radiology and Medications reviewed                        12.4   7.25  )-----------( 158      ( 16 Aug 2023 06:02 )             35.8     08-16    141  |  113<H>  |  24<H>  ----------------------------<  91  4.1   |  24  |  3.24<H>    Ca    9.1      16 Aug 2023 06:02  Phos  4.0     08-16  Mg     2.3     08-16    TPro  6.3  /  Alb  3.1<L>  /  TBili  0.5  /  DBili  0.1  /  AST  53<H>  /  ALT  94<H>  /  AlkPhos  52  08-15    PT/INR - ( 16 Aug 2023 06:02 )   PT: 12.2 sec;   INR: 1.08 ratio         PTT - ( 16 Aug 2023 06:02 )  PTT:72.1 sec  MEDICATIONS  (STANDING):  heparin  Infusion. 1500 Unit(s)/Hr (15 mL/Hr) IV Continuous <Continuous>  pantoprazole  Injectable 40 milliGRAM(s) IV Push daily  senna 2 Tablet(s) Oral at bedtime  sodium chloride 0.45%. 1000 milliLiter(s) (150 mL/Hr) IV Continuous <Continuous>    MEDICATIONS  (PRN):  acetaminophen     Tablet .. 975 milliGRAM(s) Oral every 8 hours PRN Temp greater or equal to 38C (100.4F), Mild Pain (1 - 3)  aluminum hydroxide/magnesium hydroxide/simethicone Suspension 30 milliLiter(s) Oral every 4 hours PRN Dyspepsia  bisacodyl 5 milliGRAM(s) Oral every 12 hours PRN Constipation  melatonin 3 milliGRAM(s) Oral at bedtime PRN Insomnia  oxyCODONE    IR 5 milliGRAM(s) Oral every 4 hours PRN Moderate Pain (4 - 6)  prochlorperazine   Injectable 5 milliGRAM(s) IV Push every 6 hours PRN nausea/vomiting      Physical exam  General: NAD                                                         Neuro: A+O x 3, non-focal, speech clear and intact                     ENT: Normal exam of nasal/oral mucosa with absence of cyanosis.   Neck: supple, no JVD, trachea midline   Chest:  equal expansion bilaterally,  normal excursion, no accessory muscle use note  CV: RRR,   GI: soft, NT, ND,   Extremities: warm, LLE swelling arm improved, + radial pulse  SKIN: warm, dry, intact     I&O's Summary    15 Aug 2023 07:01  -  16 Aug 2023 07:00  --------------------------------------------------------  IN: 0 mL / OUT: 3700 mL / NET: -3700 mL        Assessment  27y Male  w/ PAST MEDICAL & SURGICAL HISTORY:  No pertinent past medical history      No significant past surgical history      admitted with complaints of Patient is a 27y old  Male who presents with a chief complaint of Acute Left Upper Ext DVT (16 Aug 2023 01:09)    27y Male  presenting with LUE Axillary vein and subclavian vein DVT with Thoracic outlet syndrome. s/p LUE thrombectomy on 8/9 in IR. Post op course complicated by recurrence of DVT in the axillary subclavian and basilic veins in the immediate postop period. S/p angiojet thrombectomy of LUE 8/10. Now on heparin gtt. Called for evaluation for 1st rib resection.    First rib resection scheduled for Thursday, 8/17, tomorrow  cont full dose anticoagulation until 6am on 8/17. Stop heparin gtt 8/17 6am ordered  tylenol and oxy IR as needed for discomfort  d/w pt and mom at bedside  OOB  trend renal function, cont fluids as per renal, creatinine ~3  preop labs done  NPO p MN    Discussed with Cardiothoracic Team at AM rounds.

## 2023-08-16 NOTE — PROGRESS NOTE ADULT - SUBJECTIVE AND OBJECTIVE BOX
SURGERY DAILY PROGRESS NOTE:     Subjective:  Patient seen and examined at bedside during am rounds. Denies any LUE discomfort at this time. Swelling improved. Hydrating himself PO. Mentioned one episode of vomiting in the afternoon again but no more nausea after that episode. Complaining of R middle finger tingling since IV line was placed.   AVSS. Denies any fevers, chills, n/v/d, chest pain or shortness of breath    Objective:    ICU Vital Signs Last 24 Hrs  T(C): 36.9 (15 Aug 2023 21:27), Max: 37.3 (15 Aug 2023 06:00)  T(F): 98.5 (15 Aug 2023 21:27), Max: 99.2 (15 Aug 2023 06:00)  HR: 56 (16 Aug 2023 00:00) (56 - 74)  BP: 134/80 (16 Aug 2023 00:00) (129/62 - 147/70)  BP(mean): 95 (16 Aug 2023 00:00) (82 - 106)  ABP: --  ABP(mean): --  RR: 18 (15 Aug 2023 18:23) (15 - 18)  SpO2: 98% (16 Aug 2023 00:00) (96% - 100%)    O2 Parameters below as of 15 Aug 2023 18:23  Patient On (Oxygen Delivery Method): room air        --------------------------------------------------------  I&O's Detail    14 Aug 2023 07:01  -  15 Aug 2023 07:00  --------------------------------------------------------  IN:    Heparin Infusion: 164 mL    Oral Fluid: 100 mL    sodium chloride 0.45%: 1375 mL  Total IN: 1639 mL    OUT:    Voided (mL): 3200 mL  Total OUT: 3200 mL    Total NET: -1561 mL      15 Aug 2023 07:01  -  16 Aug 2023 01:11  --------------------------------------------------------  IN:  Total IN: 0 mL    OUT:    Voided (mL): 1050 mL  Total OUT: 1050 mL    Total NET: -1050 mL      PHYSICAL EXAM   Gen: well-appearing, in no acute distress  CV: pulse regularly present   Resp: airway patent, non-labored breathing  Abd: soft, non tender, non distended  Extremities: left hand swelling improved, minimal erythema, mild ecchymosis in antecubital fossa resolving and non tender, capillary refill <2 sec, full ROM intact. Radial pulse +2            Culture - Urine (collected 08-10-23 @ 17:46)  Source: Clean Catch None  Final Report (08-11-23 @ 22:19):    No growth      Current Inpatient Medications:  acetaminophen     Tablet .. 975 milliGRAM(s) Oral every 8 hours PRN  aluminum hydroxide/magnesium hydroxide/simethicone Suspension 30 milliLiter(s) Oral every 4 hours PRN  cefTRIAXone Injectable. 1000 milliGRAM(s) IV Push every 24 hours  heparin   Injectable 6500 Unit(s) IV Push every 6 hours PRN  heparin   Injectable 3000 Unit(s) IV Push every 6 hours PRN  heparin  Infusion. 900 Unit(s)/Hr (9 mL/Hr) IV Continuous <Continuous>  melatonin 3 milliGRAM(s) Oral at bedtime PRN  ondansetron Injectable 4 milliGRAM(s) IV Push every 8 hours PRN  oxyCODONE    IR 5 milliGRAM(s) Oral every 4 hours PRN  senna 2 Tablet(s) Oral at bedtime  sodium chloride 0.9%. 1000 milliLiter(s) (175 mL/Hr) IV Continuous <Continuous>

## 2023-08-16 NOTE — PROGRESS NOTE ADULT - ASSESSMENT
25 yo male with no past medical hx presenting with acute LUE swelling found with acute subclavian clot and possible thoracic outlet syndrome    CORNELIO  - mutlifactorial    Heme pigment CORNELIO  +/- contrast nephropathy    post large clot thrombolysis load and multiple contrast loads    Cr remains ~ 3.2 - slight improvement - awaiting recovery , good UOP    continue liberal IVF hydration    urine myoglobin, urine heme pigment pending   UA neg RBC, 600 mg protein    renal vein doppler  neg for thriombus/emboli  was neg    liver doppler neg for portal vein thrombus    avoid or minimize further contrast load   if further clot lysis required will need aggressive pre hydration    strict I/o   no NSAID    Hopeful renal recovery soon   No absolute renal contraindications for planned rib resection 8/17 if Cr remains stable    check AM labs   avoid hypotension SBP < 110, no IV contrast, no NSAID's post     d/w pt and family at bedside (mother -  Christen  HD RN ) at length   regarding risks associated w major surgery - intraop hypotension and blood loss that could impact renal recovery and they are aware but wish to proceed due to risks of prolonging surgery and risking further clots >> risks of possible recurrent CORNELIO      d/w Ila Mcfarland and RN at length

## 2023-08-17 ENCOUNTER — APPOINTMENT (OUTPATIENT)
Dept: THORACIC SURGERY | Facility: CLINIC | Age: 28
End: 2023-08-17

## 2023-08-17 LAB
ANION GAP SERPL CALC-SCNC: 6 MMOL/L — SIGNIFICANT CHANGE UP (ref 5–17)
APTT BLD: 63.4 SEC — HIGH (ref 24.5–35.6)
APTT BLD: 67.5 SEC — HIGH (ref 24.5–35.6)
BUN SERPL-MCNC: 19 MG/DL — SIGNIFICANT CHANGE UP (ref 7–23)
CALCIUM SERPL-MCNC: 8.4 MG/DL — LOW (ref 8.5–10.1)
CHLORIDE SERPL-SCNC: 115 MMOL/L — HIGH (ref 96–108)
CO2 SERPL-SCNC: 22 MMOL/L — SIGNIFICANT CHANGE UP (ref 22–31)
CREAT SERPL-MCNC: 2.74 MG/DL — HIGH (ref 0.5–1.3)
EGFR: 32 ML/MIN/1.73M2 — LOW
GLUCOSE SERPL-MCNC: 93 MG/DL — SIGNIFICANT CHANGE UP (ref 70–99)
HCT VFR BLD CALC: 32.5 % — LOW (ref 39–50)
HCT VFR BLD CALC: 32.8 % — LOW (ref 39–50)
HEMOSIDERIN UR QL MICRO: SIGNIFICANT CHANGE UP
HGB BLD-MCNC: 11.4 G/DL — LOW (ref 13–17)
HGB BLD-MCNC: 11.5 G/DL — LOW (ref 13–17)
INR BLD: 1.11 RATIO — SIGNIFICANT CHANGE UP (ref 0.85–1.18)
MAGNESIUM SERPL-MCNC: 2.1 MG/DL — SIGNIFICANT CHANGE UP (ref 1.6–2.6)
MCHC RBC-ENTMCNC: 29.9 PG — SIGNIFICANT CHANGE UP (ref 27–34)
MCHC RBC-ENTMCNC: 30.3 PG — SIGNIFICANT CHANGE UP (ref 27–34)
MCHC RBC-ENTMCNC: 34.8 GM/DL — SIGNIFICANT CHANGE UP (ref 32–36)
MCHC RBC-ENTMCNC: 35.4 GM/DL — SIGNIFICANT CHANGE UP (ref 32–36)
MCV RBC AUTO: 85.5 FL — SIGNIFICANT CHANGE UP (ref 80–100)
MCV RBC AUTO: 86.1 FL — SIGNIFICANT CHANGE UP (ref 80–100)
MYOGLOBIN UR-MCNC: 2 NG/ML — SIGNIFICANT CHANGE UP (ref 0–13)
PHOSPHATE SERPL-MCNC: 3.6 MG/DL — SIGNIFICANT CHANGE UP (ref 2.5–4.5)
PLATELET # BLD AUTO: 166 K/UL — SIGNIFICANT CHANGE UP (ref 150–400)
PLATELET # BLD AUTO: 167 K/UL — SIGNIFICANT CHANGE UP (ref 150–400)
POTASSIUM SERPL-MCNC: 4.1 MMOL/L — SIGNIFICANT CHANGE UP (ref 3.5–5.3)
POTASSIUM SERPL-SCNC: 4.1 MMOL/L — SIGNIFICANT CHANGE UP (ref 3.5–5.3)
PROTHROM AB SERPL-ACNC: 12.5 SEC — SIGNIFICANT CHANGE UP (ref 9.5–13)
RBC # BLD: 3.8 M/UL — LOW (ref 4.2–5.8)
RBC # BLD: 3.81 M/UL — LOW (ref 4.2–5.8)
RBC # FLD: 12.1 % — SIGNIFICANT CHANGE UP (ref 10.3–14.5)
RBC # FLD: 12.2 % — SIGNIFICANT CHANGE UP (ref 10.3–14.5)
SODIUM SERPL-SCNC: 143 MMOL/L — SIGNIFICANT CHANGE UP (ref 135–145)
WBC # BLD: 7.63 K/UL — SIGNIFICANT CHANGE UP (ref 3.8–10.5)
WBC # BLD: 8.56 K/UL — SIGNIFICANT CHANGE UP (ref 3.8–10.5)
WBC # FLD AUTO: 7.63 K/UL — SIGNIFICANT CHANGE UP (ref 3.8–10.5)
WBC # FLD AUTO: 8.56 K/UL — SIGNIFICANT CHANGE UP (ref 3.8–10.5)

## 2023-08-17 PROCEDURE — 99232 SBSQ HOSP IP/OBS MODERATE 35: CPT

## 2023-08-17 PROCEDURE — 99231 SBSQ HOSP IP/OBS SF/LOW 25: CPT

## 2023-08-17 RX ORDER — ONDANSETRON 8 MG/1
4 TABLET, FILM COATED ORAL EVERY 8 HOURS
Refills: 0 | Status: DISCONTINUED | OUTPATIENT
Start: 2023-08-17 | End: 2023-08-20

## 2023-08-17 RX ORDER — HEPARIN SODIUM 5000 [USP'U]/ML
1600 INJECTION INTRAVENOUS; SUBCUTANEOUS
Qty: 25000 | Refills: 0 | Status: DISCONTINUED | OUTPATIENT
Start: 2023-08-17 | End: 2023-08-17

## 2023-08-17 RX ORDER — HEPARIN SODIUM 5000 [USP'U]/ML
16 INJECTION INTRAVENOUS; SUBCUTANEOUS
Qty: 25000 | Refills: 0 | Status: DISCONTINUED | OUTPATIENT
Start: 2023-08-17 | End: 2023-08-17

## 2023-08-17 RX ORDER — HEPARIN SODIUM 5000 [USP'U]/ML
3000 INJECTION INTRAVENOUS; SUBCUTANEOUS EVERY 6 HOURS
Refills: 0 | Status: DISCONTINUED | OUTPATIENT
Start: 2023-08-17 | End: 2023-08-18

## 2023-08-17 RX ORDER — METOCLOPRAMIDE HCL 10 MG
10 TABLET ORAL ONCE
Refills: 0 | Status: DISCONTINUED | OUTPATIENT
Start: 2023-08-17 | End: 2023-08-20

## 2023-08-17 RX ORDER — HEPARIN SODIUM 5000 [USP'U]/ML
1700 INJECTION INTRAVENOUS; SUBCUTANEOUS
Qty: 25000 | Refills: 0 | Status: DISCONTINUED | OUTPATIENT
Start: 2023-08-17 | End: 2023-08-18

## 2023-08-17 RX ORDER — HEPARIN SODIUM 5000 [USP'U]/ML
INJECTION INTRAVENOUS; SUBCUTANEOUS
Qty: 25000 | Refills: 0 | Status: DISCONTINUED | OUTPATIENT
Start: 2023-08-17 | End: 2023-08-17

## 2023-08-17 RX ORDER — HEPARIN SODIUM 5000 [USP'U]/ML
6500 INJECTION INTRAVENOUS; SUBCUTANEOUS EVERY 6 HOURS
Refills: 0 | Status: DISCONTINUED | OUTPATIENT
Start: 2023-08-17 | End: 2023-08-17

## 2023-08-17 RX ORDER — PANTOPRAZOLE SODIUM 20 MG/1
40 TABLET, DELAYED RELEASE ORAL
Refills: 0 | Status: DISCONTINUED | OUTPATIENT
Start: 2023-08-17 | End: 2023-08-20

## 2023-08-17 RX ORDER — HEPARIN SODIUM 5000 [USP'U]/ML
3000 INJECTION INTRAVENOUS; SUBCUTANEOUS EVERY 6 HOURS
Refills: 0 | Status: DISCONTINUED | OUTPATIENT
Start: 2023-08-17 | End: 2023-08-17

## 2023-08-17 RX ORDER — ONDANSETRON 8 MG/1
4 TABLET, FILM COATED ORAL EVERY 8 HOURS
Refills: 0 | Status: DISCONTINUED | OUTPATIENT
Start: 2023-08-17 | End: 2023-08-17

## 2023-08-17 RX ORDER — HEPARIN SODIUM 5000 [USP'U]/ML
6500 INJECTION INTRAVENOUS; SUBCUTANEOUS EVERY 6 HOURS
Refills: 0 | Status: DISCONTINUED | OUTPATIENT
Start: 2023-08-17 | End: 2023-08-18

## 2023-08-17 RX ADMIN — HEPARIN SODIUM 1700 UNIT(S)/HR: 5000 INJECTION INTRAVENOUS; SUBCUTANEOUS at 19:35

## 2023-08-17 RX ADMIN — HEPARIN SODIUM 1700 UNIT(S)/HR: 5000 INJECTION INTRAVENOUS; SUBCUTANEOUS at 21:55

## 2023-08-17 RX ADMIN — PANTOPRAZOLE SODIUM 40 MILLIGRAM(S): 20 TABLET, DELAYED RELEASE ORAL at 09:29

## 2023-08-17 RX ADMIN — SODIUM CHLORIDE 100 MILLILITER(S): 9 INJECTION, SOLUTION INTRAVENOUS at 13:32

## 2023-08-17 RX ADMIN — HEPARIN SODIUM 1700 UNIT(S)/HR: 5000 INJECTION INTRAVENOUS; SUBCUTANEOUS at 19:01

## 2023-08-17 RX ADMIN — SODIUM CHLORIDE 150 MILLILITER(S): 9 INJECTION, SOLUTION INTRAVENOUS at 07:53

## 2023-08-17 RX ADMIN — HEPARIN SODIUM 1600 UNIT(S)/HR: 5000 INJECTION INTRAVENOUS; SUBCUTANEOUS at 11:09

## 2023-08-17 RX ADMIN — HEPARIN SODIUM 1600 UNIT(S)/HR: 5000 INJECTION INTRAVENOUS; SUBCUTANEOUS at 17:36

## 2023-08-17 RX ADMIN — SODIUM CHLORIDE 100 MILLILITER(S): 9 INJECTION, SOLUTION INTRAVENOUS at 23:48

## 2023-08-17 NOTE — PROGRESS NOTE ADULT - SUBJECTIVE AND OBJECTIVE BOX
27 year old with no past medical hx of clots presenting w acute LUE swelling found w  Acute Thrombis LUE and MRI suspicious for thoracic outlet syndrome   pt required acute clot thrombolysis w high clot load ~ 280 cc of clot , pt states she was urinating dark after procedure . pt states he is now urinating clear urine.  Denies any flank pains. pt also had multiple contrast load  with CTA chest and  venograms      feeling better -    was planned for rib resection now delayed till next month as per CTS         PAST MEDICAL & SURGICAL HISTORY:  No pertinent past medical history    No significant past surgical history    MEDICATIONS  (STANDING):  heparin  Infusion. 1600 Unit(s)/Hr (16 mL/Hr) IV Continuous <Continuous>  metoclopramide Injectable 10 milliGRAM(s) IV Push once  pantoprazole    Tablet 40 milliGRAM(s) Oral before breakfast  senna 2 Tablet(s) Oral at bedtime  sodium chloride 0.45%. 1000 milliLiter(s) (100 mL/Hr) IV Continuous <Continuous>        Allergies    No Known Allergies    Intolerances      MEDICATIONS  (STANDING):  heparin  Infusion. 1500 Unit(s)/Hr (15 mL/Hr) IV Continuous <Continuous>  pantoprazole  Injectable 40 milliGRAM(s) IV Push daily  senna 2 Tablet(s) Oral at bedtime  sodium chloride 0.45%. 1000 milliLiter(s) (150 mL/Hr) IV Continuous <Continuous>      Vital Signs Last 24 Hrs  T(C): 36.8 (17 Aug 2023 10:04), Max: 37.4 (16 Aug 2023 18:12)  T(F): 98.2 (17 Aug 2023 10:04), Max: 99.3 (16 Aug 2023 18:12)  HR: 53 (17 Aug 2023 11:00) (48 - 64)  BP: 147/84 (17 Aug 2023 11:00) (119/59 - 151/86)  BP(mean): 101 (17 Aug 2023 11:00) (76 - 104)  RR: --  SpO2: 100% (17 Aug 2023 11:00) (96% - 100%)    Parameters below as of 17 Aug 2023 11:00  Patient On (Oxygen Delivery Method): room air      I&O's Detail    16 Aug 2023 07:01  -  17 Aug 2023 07:00  --------------------------------------------------------  IN:  Total IN: 0 mL    OUT:    Voided (mL): 1925 mL  Total OUT: 1925 mL    Total NET: -1925 mL        PHYSICAL EXAM:    Constitutional: NAD  HEENT: , EOMI,  MM  Neck: No LAD, No JVD  Respiratory: CTAB  Cardiovascular: S1 and S2  Gastrointestinal: BS+, soft, NT/ND   Extremities: No peripheral edema, LUE edema mild , move all ext   Neurological: A/O x 3, no focal deficits  : No Damico  Skin: No rashes  Access: Not applicable    LABS:                                     11.5   8.56  )-----------( 167      ( 17 Aug 2023 06:14 )             32.5       143    |  115    |  19     ----------------------------<  93        17 Aug 2023 06:14  4.1     |  22     |  2.74     141    |  113    |  24     ----------------------------<  91        16 Aug 2023 06:02  4.1     |  24     |  3.24     141    |  113    |  25     ----------------------------<  95        15 Aug 2023 07:48  4.3     |  22     |  3.31     Ca    8.4        17 Aug 2023 06:14  Ca    9.1        16 Aug 2023 06:02    Phos  3.6       17 Aug 2023 06:14  Phos  4.0       16 Aug 2023 06:02    Mg     2.1       17 Aug 2023 06:14  Mg     2.3       16 Aug 2023 06:02    TPro  6.3    /  Alb  3.1    /  TBili  0.5    /        15 Aug 2023 07:48  DBili  0.1    /  AST  53     /  ALT  94     /  AlkPhos  52       TPro  6.1    /  Alb  2.9    /  TBili  0.4    /        14 Aug 2023 05:45  DBili  0.1    /  AST  48     /  ALT  100    /  AlkPhos  48               Urine Microscopic-Add On (NC) (08.10.23 @ 17:46)   Red Blood Cell - Urine: 6-10 /HPF  White Blood Cell - Urine: 0-2 /HPF  Bacteria: Moderate  Comment - Urine: moderate hyphae-like cells seen  Squamous Epithelial Cells: Negative    Urinalysis (08.10.23 @ 17:46)   Glucose Qualitative, Urine: Negative  Blood, Urine: Large  pH Urine: 8.0  Color: Brown  Urine Appearance: Clear  Bilirubin: Negative  Ketone - Urine: Trace  Specific Gravity: 1.010  Protein, Urine: 100  Urobilinogen: Negative  Nitrite: Positive  Leukocyte Esterase Concentration: Trace          RADIOLOGY & ADDITIONAL STUDIES:

## 2023-08-17 NOTE — PROGRESS NOTE ADULT - SUBJECTIVE AND OBJECTIVE BOX
SURGERY DAILY PROGRESS NOTE:     Subjective:  Patient seen and examined at bedside during am rounds. Denies any LUE discomfort at this time. Swelling improved. Hydrating himself PO. Mentioned one episode of vomiting in the afternoon again but no more nausea after that episode. Complaining of R middle finger tingling since IV line was placed. Pt NPO for surgery today. No acute overnight events  AVSS. Denies any fevers, chills, n/v/d, chest pain or shortness of breath    Objective:    ICU Vital Signs Last 24 Hrs  T(C): 36.7 (16 Aug 2023 21:30), Max: 37.4 (16 Aug 2023 18:12)  T(F): 98.1 (16 Aug 2023 21:30), Max: 99.3 (16 Aug 2023 18:12)  HR: 59 (17 Aug 2023 06:00) (48 - 64)  BP: 130/75 (17 Aug 2023 06:00) (119/59 - 148/86)  BP(mean): 91 (17 Aug 2023 06:00) (76 - 101)  ABP: --  ABP(mean): --  RR: --  SpO2: 97% (17 Aug 2023 06:00) (97% - 100%)    O2 Parameters below as of 16 Aug 2023 20:00  Patient On (Oxygen Delivery Method): room air        --------------------------------------------------------  I&O's Detail    15 Aug 2023 07:01  -  16 Aug 2023 07:00  --------------------------------------------------------  IN:  Total IN: 0 mL    OUT:    Voided (mL): 3700 mL  Total OUT: 3700 mL    Total NET: -3700 mL      16 Aug 2023 07:01  -  17 Aug 2023 06:43  --------------------------------------------------------  IN:  Total IN: 0 mL    OUT:    Voided (mL): 1925 mL  Total OUT: 1925 mL    Total NET: -1925 mL            PHYSICAL EXAM   Gen: well-appearing, in no acute distress  CV: pulse regularly present   Resp: airway patent, non-labored breathing  Abd: soft, non tender, non distended  Extremities: left hand swelling improved, minimal erythema, mild ecchymosis in antecubital fossa resolving and non tender, capillary refill <2 sec, full ROM intact. Radial pulse +2            Culture - Urine (collected 08-10-23 @ 17:46)  Source: Clean Catch None  Final Report (08-11-23 @ 22:19):    No growth      Current Inpatient Medications:  acetaminophen     Tablet .. 975 milliGRAM(s) Oral every 8 hours PRN  aluminum hydroxide/magnesium hydroxide/simethicone Suspension 30 milliLiter(s) Oral every 4 hours PRN  cefTRIAXone Injectable. 1000 milliGRAM(s) IV Push every 24 hours  heparin   Injectable 6500 Unit(s) IV Push every 6 hours PRN  heparin   Injectable 3000 Unit(s) IV Push every 6 hours PRN  heparin  Infusion. 900 Unit(s)/Hr (9 mL/Hr) IV Continuous <Continuous>  melatonin 3 milliGRAM(s) Oral at bedtime PRN  ondansetron Injectable 4 milliGRAM(s) IV Push every 8 hours PRN  oxyCODONE    IR 5 milliGRAM(s) Oral every 4 hours PRN  senna 2 Tablet(s) Oral at bedtime  sodium chloride 0.9%. 1000 milliLiter(s) (175 mL/Hr) IV Continuous <Continuous>

## 2023-08-17 NOTE — PROGRESS NOTE ADULT - SUBJECTIVE AND OBJECTIVE BOX
Patient is a 27y old  Male who presents with a chief complaint of Acute Left Upper Ext DVT (14 Aug 2023 09:40)    BRIEF HOSPITAL COURSE: 28 yo Male with no PMHx presented c/o left arm swelling and pain with purple discoloration that started yesterday. No fever or chills, No ho trauma. No recent travel. As per EMR pt working out a lot, very active lifestyle, works as .   Found to have L axillary vein thrombosis  CTA neg for PE  MR chest with segmental acute thrombosis subclavian and axillary veins with evidence L sided thoracic outlet syndrome    s/p thrombectomy of subclavian and axillary vein 8/9  Post op course b/l recurrence of LUE clot. and worsening CORNELIO    s/p thrombectomy of subclavian to basilic vein 8/10    8/14 c/o feeling nauseous still with constipation. Poor PO intake. states abd pain is in LUQ, non tender to palpation. denies dizziness. last BM yesterday - described as hard stools. states he is voiding.   8/15 had BM this am, nausea better today. ambulated in hallway.   8/16 pt c/o pain at IV sites on RUE. ambulating. nausea improving  8/17 LUE swelling and pain improving. still with R middle finger tip numbness    PAST MEDICAL & SURGICAL HISTORY:  No pertinent past medical history  No significant past surgical history      MEDICATIONS  (STANDING):  heparin  Infusion. 1600 Unit(s)/Hr (16 mL/Hr) IV Continuous <Continuous>  pantoprazole    Tablet 40 milliGRAM(s) Oral before breakfast  senna 2 Tablet(s) Oral at bedtime  sodium chloride 0.45%. 1000 milliLiter(s) (100 mL/Hr) IV Continuous <Continuous>      Vital Signs Last 24 Hrs  T(C): 36.8 (17 Aug 2023 10:04), Max: 37.4 (16 Aug 2023 18:12)  T(F): 98.2 (17 Aug 2023 10:04), Max: 99.3 (16 Aug 2023 18:12)  HR: 53 (17 Aug 2023 11:00) (48 - 64)  BP: 147/84 (17 Aug 2023 11:00) (119/59 - 151/86)  BP(mean): 101 (17 Aug 2023 11:00) (76 - 104)  RR: --  SpO2: 100% (17 Aug 2023 11:00) (96% - 100%)    Parameters below as of 17 Aug 2023 11:00  Patient On (Oxygen Delivery Method): room air    I&O's Detail    16 Aug 2023 07:01  -  17 Aug 2023 07:00  --------------------------------------------------------  IN:  Total IN: 0 mL    OUT:    Voided (mL): 1925 mL  Total OUT: 1925 mL    Total NET: -1925 mL      LABS:                              11.5   8.56  )-----------( 167      ( 17 Aug 2023 06:14 )             32.5     08-17    143  |  115<H>  |  19  ----------------------------<  93  4.1   |  22  |  2.74<H>    Ca    8.4<L>      17 Aug 2023 06:14  Phos  3.6     08-17  Mg     2.1     08-17      PT/INR - ( 17 Aug 2023 06:14 )   PT: 12.5 sec;   INR: 1.11 ratio    PTT - ( 17 Aug 2023 06:14 )  PTT:63.4 sec  Urinalysis Basic - ( 17 Aug 2023 06:14 )    Color: x / Appearance: x / SG: x / pH: x  Gluc: 93 mg/dL / Ketone: x  / Bili: x / Urobili: x   Blood: x / Protein: x / Nitrite: x   Leuk Esterase: x / RBC: x / WBC x   Sq Epi: x / Non Sq Epi: x / Bacteria: x      CULTURES:  Culture Results:   No growth (08-10 @ 17:46)    Physical Examination:    General: No acute distress.    PULM: Clear to ausculation bilaterally  CVS: Regular rate and rhythm, no murmurs  ABD: Soft, nondistended, nontender, normoactive bowel sound  EXT: LUE with mild edema and tender  - improving  SKIN: Warm and well perfused, no rashes noted.  NEURO: Alert, oriented, interactive    DEVICES:   RUE midline    RADIOLOGY:   < from: US Duplex Venous Upper Ext Ltd, Left (08.09.23 @ 18:56) >  IMPRESSION:  Acute, occlusive deep venous thrombosis in the left subclavian and   axillary veins.  Acute, occlusive superficial venous thrombosis in the left basilic vein.  I discussed the findings with  on 08/09/2023 at 7:05 PM.  Read   back verification was obtained.    < end of copied text >      < from: US Duplex Kidneys (08.12.23 @ 08:28) >  IMPRESSION:    No evidence of renal vein thrombosis.    < end of copied text >    < from: US Kidney and Bladder (08.10.23 @ 16:57) >    IMPRESSION:  No hydronephrosis or renal calculus.    Underdistended urinary bladder, limiting evaluation with wall thickening   which may be secondary to underdistention.    < end of copied text >

## 2023-08-17 NOTE — PROGRESS NOTE ADULT - ASSESSMENT
27yoM presenting with LUE Axillary vein and subclavian vein DVT. Thoracic outlet syndrome   s/p LUE thrombectomy on 8/9 . Post op course complicated by recurrence of DVT in the axillary subclavian and basilic veins in the immediate postop s/p angiojet thrombectomy of LUE 8/10  CORNELIO. Nephrology on board  US abd for abdominal pain showed ascites and b/L pleural eff and US doppler: neg. KUB done to r/ ileu      Plan:  1st rib resection by cardiothoracic surgery today  Pain control and nausea control PRN  Continue AC with hep drip, currently at 15 ml/h therapeutic  Continue Arm elevation  Nephrology on board evaluation of CORNELIO  C/w bowel regimen, having BM  ICU team on board for medical management  Daily labs    Will discuss plan with Dr. Kim

## 2023-08-17 NOTE — PROGRESS NOTE ADULT - NS ATTEND AMEND GEN_ALL_CORE FT
L 1st rib resection tomorrow - medically stable for it   Continue iv heparin   Ambulating
Patient assessed   Remains clinically stable   AAOx3, VSS, resp stable on RA, ambulating     Surgery canceled due to concern with holding AC and increased risk of recurrent thrombosis requiring contrast studies with risk for further kidney injury     Transition to po AC per vascular     D/c midline when able to d/c either IVF or heparin     Stable for floor    Parents updated at bedside
Clinically stable   Ambulating   Continue iv heparin   L 1st rib resection 8/17
as above

## 2023-08-17 NOTE — PROGRESS NOTE ADULT - NS ATTEND OPT1A GEN_ALL_CORE
History/Medical decision making
History/Medical decision making
History/Exam/Medical decision making
History/Medical decision making

## 2023-08-17 NOTE — PROGRESS NOTE ADULT - ASSESSMENT
27y Male  presenting with LUE Axillary vein and subclavian vein DVT with Thoracic outlet syndrome. s/p LUE thrombectomy on 8/9 in IR. Post op course complicated by recurrence of DVT in the axillary subclavian and basilic veins in the immediate postop period. S/p angiojet thrombectomy of LUE 8/10. Now on heparin gtt. Called for evaluation for L 1st rib resection. Hosp course complicated by contrast induced CORNELIO.     PLAN  OR delayed until next month (planing for 9/7/23) in order to allow kidney function to further improve and decrease risk of repeat thrombectomy with contrast load this admission while patient off AC secondary during miguel a-op period  Hep gtt continued  Resume regular diet  Trend Cr  Plan to transition to oral AC tomorrow and monitor inpatient for a few days prior to discharge home  Admitted to Vascular   Heme workup as outpatient   D/W Dr. Harris

## 2023-08-17 NOTE — PROGRESS NOTE ADULT - SUBJECTIVE AND OBJECTIVE BOX
Patient was tentatively scheduled for first rib resection today, situation d/w vascular surgery staff and we have decided to delay the rib operation.   Clinically the arm is improving with slight swelling and no pain or discoloration    I explained to the patient and the family that given the need to stop AC for 2-3 days in the miguel a op period to prevent post op bleeding   there is a significant risk of re thrombosis. This would require repeat angiography. His Cr is slowly downtrending but still 2.7 as of today. Repeat angio in this situation would increase the risk of furthering  kidney damage. Delaying surgery, while on AC, with time for endothelial recovery also will allow us to stop AC after rib resection with a lower risk of bleeding and thrombosis.     All options and rationales were discussed in detail with the patient and the family. Plan for change to oral AC in hospital also communicated.

## 2023-08-17 NOTE — PROGRESS NOTE ADULT - ASSESSMENT
27 yo male with no past medical hx presenting with acute LUE swelling found with acute subclavian clot and possible thoracic outlet syndrome    CORNELIO  - mutlifactorial    Contrast nephropathy +/- heme pigment injury post large clot thrombolysis load and multiple contrast loads    Cr improving - continue IVF - may reduce rate to 100/hr today - do not DC IVF today    renal vein doppler  neg for thriombus/emboli  was neg    continue to avoid  further contrast load unless emergent - if contrast required in future will need to await recovery and IV prehdyration    if further clot lysis required will need aggressive pre hydration    strict I/o   no NSAID   check AM labs   avoid hypotension SBP < 110, no IV contrast, no NSAID's post     THoraci Outlet syndrome   rib resection timing as per CTS       d/w pt and family at bedside (mother -  Christen LORENZ RN ) at length     d/w Ila Mcfarland and RN at length

## 2023-08-17 NOTE — PROGRESS NOTE ADULT - SUBJECTIVE AND OBJECTIVE BOX
Subjective: Patient c/o pain at LUE PIV site. Otherwise no complaints. Family decision to wait for kidney function to improve prior to any surgical intervention. OR cancelled today.     Vital Signs:  Vital Signs Last 24 Hrs  T(C): 36.8 (08-17-23 @ 10:04), Max: 37.4 (08-16-23 @ 18:12)  T(F): 98.2 (08-17-23 @ 10:04), Max: 99.3 (08-16-23 @ 18:12)  HR: 53 (08-17-23 @ 11:00) (48 - 64)  BP: 147/84 (08-17-23 @ 11:00) (119/59 - 151/86)  RR: --  SpO2: 100% (08-17-23 @ 11:00) (96% - 100%) on (O2)    General: NAD  Neurology: Awake, nonfocal, COLEMAN x 4  Eyes: EOMI, Gross vision intact  ENT: Gross hearing intact, grossly patent pharynx, no stridor  Neck: Neck supple, trachea midline, No JVD, clavicle symmetrical b/l with no masses or stepoffs  Respiratory: CTA B/L, No wheezing, rales, rhonchi  CV: RRR, S1S2, no murmurs, rubs or gallops  Abdominal: Soft, NT, ND  Extremities: No edema, + peripheral pulses, IR access site c/d/i with minimal to moderate ecchymosis to L axilla         Relevant labs, radiology and Medications reviewed                        11.5   8.56  )-----------( 167      ( 17 Aug 2023 06:14 )             32.5     08-17    143  |  115<H>  |  19  ----------------------------<  93  4.1   |  22  |  2.74<H>    Ca    8.4<L>      17 Aug 2023 06:14  Phos  3.6     08-17  Mg     2.1     08-17      PT/INR - ( 17 Aug 2023 06:14 )   PT: 12.5 sec;   INR: 1.11 ratio         PTT - ( 17 Aug 2023 06:14 )  PTT:63.4 sec  MEDICATIONS  (STANDING):  heparin  Infusion. 1600 Unit(s)/Hr (16 mL/Hr) IV Continuous <Continuous>  pantoprazole    Tablet 40 milliGRAM(s) Oral before breakfast  senna 2 Tablet(s) Oral at bedtime  sodium chloride 0.45%. 1000 milliLiter(s) (100 mL/Hr) IV Continuous <Continuous>    MEDICATIONS  (PRN):  acetaminophen     Tablet .. 975 milliGRAM(s) Oral every 8 hours PRN Temp greater or equal to 38C (100.4F), Mild Pain (1 - 3)  acetaminophen   IVPB .. 1000 milliGRAM(s) IV Intermittent once PRN Temp greater or equal to 38C (100.4F), Mild Pain (1 - 3)  aluminum hydroxide/magnesium hydroxide/simethicone Suspension 30 milliLiter(s) Oral every 4 hours PRN Dyspepsia  bisacodyl 5 milliGRAM(s) Oral every 12 hours PRN Constipation  heparin   Injectable 6500 Unit(s) IV Push every 6 hours PRN For aPTT less than 40  heparin   Injectable 3000 Unit(s) IV Push every 6 hours PRN For aPTT between 40 - 57  melatonin 3 milliGRAM(s) Oral at bedtime PRN Insomnia  oxyCODONE    IR 5 milliGRAM(s) Oral every 4 hours PRN Moderate Pain (4 - 6)  prochlorperazine   Injectable 5 milliGRAM(s) IV Push every 6 hours PRN nausea/vomiting        Assessment  27y Male  w/ PAST MEDICAL & SURGICAL HISTORY:  No pertinent past medical history      No significant past surgical history      admitted with complaints of Patient is a 27y old  Male who presents with a chief complaint of Acute Left Upper Ext DVT (17 Aug 2023 12:25)

## 2023-08-17 NOTE — PROGRESS NOTE ADULT - ASSESSMENT
ASSESSMENT  28 yo Male with no PMHx presented c/o left arm swelling and pain with purple discoloration that started yesterday. No fever or chills, No ho trauma. No recent travel. As per EMR pt working out a lot, very active lifestyle, works as .     US doppler revealing L axillary vein thrombosis. CTA neg for PE  MR chest with segmental acute thrombosis subclavian and axillary veins with evidence L sided thoracic outlet syndrome    s/p thrombectomy of subclavian and axillary vein 8/9  Post op course b/l recurrence of LUE clot. and worsening CORNELIO    s/p thrombectomy of subclavian to basilic vein 8/10    PLAN  Neuro: mentation intact. pain control prn. no NSAIDs. IV tylenol, oxy  Pulm: on room air.   GI: PO diet as tolerated. cont bowel regimen prn.  Nephro: CORNELIO likely contrast induced and poor PO intake. Cr improving today 3.2 -> 2.7 cont 1/2 NS decrease rate @ 100 - d/w nephro, and encourage more PO intake.   ID: no abx indicated at this time. urine cx neg. afebrile. monitor temps  Heme: cont IV heparin gtt today. plan to transition to PO AC tomorrow, monitor for few days to make sure patient doesnt clot. plan for pt to follow up a few weeks after d/c to then undergo repeat angio to assess DVTs, then CTS/Vasc surgery will determine need for surgery.     Dispo: Stable for floor. Trend renal fxn. dec rate IV fluids. IV heparin gtt, transition to PO AC tomorrow.    discussed with Dr. Harrell, CTS, nephrology   ASSESSMENT  26 yo Male with no PMHx presented c/o left arm swelling and pain with purple discoloration that started yesterday. No fever or chills, No ho trauma. No recent travel. As per EMR pt working out a lot, very active lifestyle, works as .     US doppler revealing L axillary vein thrombosis. CTA neg for PE  MR chest with segmental acute thrombosis subclavian and axillary veins with evidence L sided thoracic outlet syndrome    s/p thrombectomy of subclavian and axillary vein 8/9  Post op course b/l recurrence of LUE clot. and worsening CORNELIO    s/p thrombectomy of subclavian to basilic vein 8/10    PLAN  Neuro: mentation intact. pain control prn. no NSAIDs. IV tylenol, oxy  Pulm: on room air.   GI: PO diet as tolerated. cont bowel regimen prn.  Nephro: CORNELIO likely contrast induced and poor PO intake. Cr improving today 3.2 -> 2.7 cont 1/2 NS decrease rate @ 100 - d/w nephro, and encourage more PO intake.   ID: no abx indicated at this time. urine cx neg. afebrile. monitor temps  Heme: cont IV heparin gtt today. plan to transition to PO AC tomorrow, monitor for few days to make sure patient doesnt clot. plan for pt to follow up a few weeks after d/c to then undergo repeat angio to assess DVTs, then CTS/Vasc surgery will determine need for surgery.     Dispo: Stable for floor. Trend renal fxn. dec rate IV fluids. IV heparin gtt, transition to PO AC tomorrow.    discussed with Dr. Harrell, CTS, nephrology    signed out to Dr. Martines 340pm

## 2023-08-18 LAB
ANION GAP SERPL CALC-SCNC: 6 MMOL/L — SIGNIFICANT CHANGE UP (ref 5–17)
APTT BLD: 77.7 SEC — HIGH (ref 24.5–35.6)
APTT BLD: 78.2 SEC — HIGH (ref 24.5–35.6)
APTT BLD: 86.3 SEC — HIGH (ref 24.5–35.6)
BASOPHILS # BLD AUTO: 0.02 K/UL — SIGNIFICANT CHANGE UP (ref 0–0.2)
BASOPHILS NFR BLD AUTO: 0.2 % — SIGNIFICANT CHANGE UP (ref 0–2)
BUN SERPL-MCNC: 17 MG/DL — SIGNIFICANT CHANGE UP (ref 7–23)
CALCIUM SERPL-MCNC: 8.4 MG/DL — LOW (ref 8.5–10.1)
CHLORIDE SERPL-SCNC: 113 MMOL/L — HIGH (ref 96–108)
CO2 SERPL-SCNC: 24 MMOL/L — SIGNIFICANT CHANGE UP (ref 22–31)
CREAT SERPL-MCNC: 2.44 MG/DL — HIGH (ref 0.5–1.3)
EGFR: 37 ML/MIN/1.73M2 — LOW
EOSINOPHIL # BLD AUTO: 0.1 K/UL — SIGNIFICANT CHANGE UP (ref 0–0.5)
EOSINOPHIL NFR BLD AUTO: 1.2 % — SIGNIFICANT CHANGE UP (ref 0–6)
GLUCOSE SERPL-MCNC: 95 MG/DL — SIGNIFICANT CHANGE UP (ref 70–99)
HCT VFR BLD CALC: 30.5 % — LOW (ref 39–50)
HCT VFR BLD CALC: 31.6 % — LOW (ref 39–50)
HGB BLD-MCNC: 10.8 G/DL — LOW (ref 13–17)
HGB BLD-MCNC: 11.1 G/DL — LOW (ref 13–17)
IMM GRANULOCYTES NFR BLD AUTO: 0.5 % — SIGNIFICANT CHANGE UP (ref 0–0.9)
INR BLD: 1.21 RATIO — HIGH (ref 0.85–1.18)
INR BLD: 1.21 RATIO — HIGH (ref 0.85–1.18)
LYMPHOCYTES # BLD AUTO: 1.1 K/UL — SIGNIFICANT CHANGE UP (ref 1–3.3)
LYMPHOCYTES # BLD AUTO: 13.7 % — SIGNIFICANT CHANGE UP (ref 13–44)
MAGNESIUM SERPL-MCNC: 2 MG/DL — SIGNIFICANT CHANGE UP (ref 1.6–2.6)
MCHC RBC-ENTMCNC: 29.9 PG — SIGNIFICANT CHANGE UP (ref 27–34)
MCHC RBC-ENTMCNC: 30.2 PG — SIGNIFICANT CHANGE UP (ref 27–34)
MCHC RBC-ENTMCNC: 35.1 GM/DL — SIGNIFICANT CHANGE UP (ref 32–36)
MCHC RBC-ENTMCNC: 35.4 GM/DL — SIGNIFICANT CHANGE UP (ref 32–36)
MCV RBC AUTO: 85.2 FL — SIGNIFICANT CHANGE UP (ref 80–100)
MCV RBC AUTO: 85.2 FL — SIGNIFICANT CHANGE UP (ref 80–100)
MONOCYTES # BLD AUTO: 0.94 K/UL — HIGH (ref 0–0.9)
MONOCYTES NFR BLD AUTO: 11.7 % — SIGNIFICANT CHANGE UP (ref 2–14)
NEUTROPHILS # BLD AUTO: 5.82 K/UL — SIGNIFICANT CHANGE UP (ref 1.8–7.4)
NEUTROPHILS NFR BLD AUTO: 72.7 % — SIGNIFICANT CHANGE UP (ref 43–77)
PHOSPHATE SERPL-MCNC: 3.5 MG/DL — SIGNIFICANT CHANGE UP (ref 2.5–4.5)
PLATELET # BLD AUTO: 170 K/UL — SIGNIFICANT CHANGE UP (ref 150–400)
PLATELET # BLD AUTO: 176 K/UL — SIGNIFICANT CHANGE UP (ref 150–400)
POTASSIUM SERPL-MCNC: 4.1 MMOL/L — SIGNIFICANT CHANGE UP (ref 3.5–5.3)
POTASSIUM SERPL-SCNC: 4.1 MMOL/L — SIGNIFICANT CHANGE UP (ref 3.5–5.3)
PROTHROM AB SERPL-ACNC: 13.6 SEC — HIGH (ref 9.5–13)
PROTHROM AB SERPL-ACNC: 13.6 SEC — HIGH (ref 9.5–13)
RBC # BLD: 3.58 M/UL — LOW (ref 4.2–5.8)
RBC # BLD: 3.71 M/UL — LOW (ref 4.2–5.8)
RBC # FLD: 12.1 % — SIGNIFICANT CHANGE UP (ref 10.3–14.5)
RBC # FLD: 12.1 % — SIGNIFICANT CHANGE UP (ref 10.3–14.5)
SODIUM SERPL-SCNC: 143 MMOL/L — SIGNIFICANT CHANGE UP (ref 135–145)
WBC # BLD: 8.02 K/UL — SIGNIFICANT CHANGE UP (ref 3.8–10.5)
WBC # BLD: 9.78 K/UL — SIGNIFICANT CHANGE UP (ref 3.8–10.5)
WBC # FLD AUTO: 8.02 K/UL — SIGNIFICANT CHANGE UP (ref 3.8–10.5)
WBC # FLD AUTO: 9.78 K/UL — SIGNIFICANT CHANGE UP (ref 3.8–10.5)

## 2023-08-18 PROCEDURE — 99231 SBSQ HOSP IP/OBS SF/LOW 25: CPT

## 2023-08-18 RX ORDER — APIXABAN 2.5 MG/1
10 TABLET, FILM COATED ORAL EVERY 12 HOURS
Refills: 0 | Status: DISCONTINUED | OUTPATIENT
Start: 2023-08-18 | End: 2023-08-20

## 2023-08-18 RX ORDER — ACETAMINOPHEN 500 MG
1000 TABLET ORAL ONCE
Refills: 0 | Status: COMPLETED | OUTPATIENT
Start: 2023-08-18 | End: 2023-08-18

## 2023-08-18 RX ORDER — APIXABAN 2.5 MG/1
10 TABLET, FILM COATED ORAL ONCE
Refills: 0 | Status: COMPLETED | OUTPATIENT
Start: 2023-08-18 | End: 2023-08-18

## 2023-08-18 RX ADMIN — Medication 1000 MILLIGRAM(S): at 13:13

## 2023-08-18 RX ADMIN — APIXABAN 10 MILLIGRAM(S): 2.5 TABLET, FILM COATED ORAL at 21:54

## 2023-08-18 RX ADMIN — HEPARIN SODIUM 1700 UNIT(S)/HR: 5000 INJECTION INTRAVENOUS; SUBCUTANEOUS at 01:59

## 2023-08-18 RX ADMIN — HEPARIN SODIUM 1700 UNIT(S)/HR: 5000 INJECTION INTRAVENOUS; SUBCUTANEOUS at 02:40

## 2023-08-18 RX ADMIN — Medication 5 MILLIGRAM(S): at 07:48

## 2023-08-18 RX ADMIN — SODIUM CHLORIDE 100 MILLILITER(S): 9 INJECTION, SOLUTION INTRAVENOUS at 09:07

## 2023-08-18 RX ADMIN — Medication 400 MILLIGRAM(S): at 12:58

## 2023-08-18 RX ADMIN — HEPARIN SODIUM 1700 UNIT(S)/HR: 5000 INJECTION INTRAVENOUS; SUBCUTANEOUS at 07:10

## 2023-08-18 RX ADMIN — SODIUM CHLORIDE 50 MILLILITER(S): 9 INJECTION, SOLUTION INTRAVENOUS at 23:31

## 2023-08-18 RX ADMIN — APIXABAN 10 MILLIGRAM(S): 2.5 TABLET, FILM COATED ORAL at 09:28

## 2023-08-18 RX ADMIN — HEPARIN SODIUM 1700 UNIT(S)/HR: 5000 INJECTION INTRAVENOUS; SUBCUTANEOUS at 07:45

## 2023-08-18 NOTE — PROGRESS NOTE ADULT - ASSESSMENT
27 yo male with no past medical hx presenting with acute LUE swelling found with acute subclavian clot and possible thoracic outlet syndrome    CORNELIO  - mutlifactorial    Contrast nephropathy +/- heme pigment injury post large clot thrombolysis load and multiple contrast loads    Cr improving - continue IVF - continue to taper down  - 50/hr - if cr improves can likely DC IVF in AM    check AM labs    renal vein doppler  neg for thriombus/emboli  was neg    continue to avoid  further contrast load unless emergent    **- if contrast required in future will need to await recovery and IV prehdyration    strict I/o   no NSAID   avoid hypotension SBP < 110, no IV contrast, no NSAID's post     THoracic Outlet syndrome   rib resection timing as per CTS     if DC home fup w labs  next week - script given to mother    Dr Freeman covering weekend      Dr Smith to round next week     d/w pt and family at bedside (mother -  Christen RN ) at length                         What Is The Reason For Today's Visit?: History of Melanoma Year Excised?: 1994

## 2023-08-18 NOTE — PROGRESS NOTE ADULT - ASSESSMENT
27yoM presenting with LUE Axillary vein and subclavian vein DVT. Thoracic outlet syndrome   s/p LUE thrombectomy on 8/9 . Post op course complicated by recurrence of DVT in the axillary subclavian and basilic veins in the immediate postop s/p angiojet thrombectomy of LUE 8/10  CORNELIO. Nephrology on board  US abd for abdominal pain showed ascites and b/L pleural eff and US doppler: neg. KUB done to r/ ileus  Procedure postponed as outpatient for 1st rib resection      Plan:  1st rib resection by cardiothoracic surgery postponed  Pain control and nausea control PRN  ON AC with hep drip, currently at 17 ml/h therapeutic. will switch to oral anticoagulation today  Continue Arm elevation  Nephrology on board evaluation of CORNELIO  C/w bowel regimen, having   ICU team on board for medical management  Daily labs    Will discuss plan with Dr. Kim

## 2023-08-18 NOTE — PROGRESS NOTE ADULT - SUBJECTIVE AND OBJECTIVE BOX
Subjective: Patient with N/V today. C/O RUE PIV insertion site pain and numbness to R 3rd finger. Otherwise doing well. Started on PO Eliquis.     Vital Signs:  Vital Signs Last 24 Hrs  T(C): 37.2 (08-18-23 @ 08:15), Max: 37.4 (08-18-23 @ 00:20)  T(F): 99 (08-18-23 @ 08:15), Max: 99.3 (08-18-23 @ 00:20)  HR: 57 (08-18-23 @ 08:15) (51 - 68)  BP: 140/85 (08-18-23 @ 08:15) (138/80 - 149/83)  RR: 18 (08-18-23 @ 08:15) (16 - 18)  SpO2: 98% (08-18-23 @ 08:15) (98% - 100%) on (O2)      General: NAD  Neurology: Awake, nonfocal, COLEMAN x 4  Eyes: EOMI, Gross vision intact  ENT: Gross hearing intact, grossly patent pharynx, no stridor  Neck: Neck supple, trachea midline, No JVD  Respiratory: CTA B/L, No wheezing, rales, rhonchi  CV: RRR, S1S2, no murmurs, rubs or gallops  Abdominal: Soft, NT, ND  Extremities: No apaprent edema, good cap refil in B/L UE   Skin: No Rashes, Hematoma, Ecchymosis  Lymphatic: No Neck, axilla, groin LAD  Psych: Oriented x 3, normal affect  Incisions:   Tubes:    Relevant labs, radiology and Medications reviewed                        11.1   8.02  )-----------( 176      ( 18 Aug 2023 06:41 )             31.6     08-18    143  |  113<H>  |  17  ----------------------------<  95  4.1   |  24  |  2.44<H>    Ca    8.4<L>      18 Aug 2023 06:41  Phos  3.5     08-18  Mg     2.0     08-18      PT/INR - ( 18 Aug 2023 06:41 )   PT: 13.6 sec;   INR: 1.21 ratio         PTT - ( 18 Aug 2023 06:41 )  PTT:78.2 sec  MEDICATIONS  (STANDING):  metoclopramide Injectable 10 milliGRAM(s) IV Push once  pantoprazole    Tablet 40 milliGRAM(s) Oral before breakfast  senna 2 Tablet(s) Oral at bedtime  sodium chloride 0.45%. 1000 milliLiter(s) (100 mL/Hr) IV Continuous <Continuous>    MEDICATIONS  (PRN):  acetaminophen     Tablet .. 975 milliGRAM(s) Oral every 8 hours PRN Temp greater or equal to 38C (100.4F), Mild Pain (1 - 3)  acetaminophen   IVPB .. 1000 milliGRAM(s) IV Intermittent once PRN Temp greater or equal to 38C (100.4F), Mild Pain (1 - 3)  aluminum hydroxide/magnesium hydroxide/simethicone Suspension 30 milliLiter(s) Oral every 4 hours PRN Dyspepsia  bisacodyl 5 milliGRAM(s) Oral every 12 hours PRN Constipation  melatonin 3 milliGRAM(s) Oral at bedtime PRN Insomnia  ondansetron Injectable 4 milliGRAM(s) IV Push every 8 hours PRN Nausea and/or Vomiting  oxyCODONE    IR 5 milliGRAM(s) Oral every 4 hours PRN Moderate Pain (4 - 6)        Assessment  27y Male  w/ PAST MEDICAL & SURGICAL HISTORY:  No pertinent past medical history      No significant past surgical history      admitted with complaints of Patient is a 27y old  Male who presents with a chief complaint of Acute Left Upper Ext DVT (18 Aug 2023 05:50)  .  On (Date), patient underwent Thrombectomy of axillary or subclavian vein    . Postoperative course/issues:    PLAN  Neuro: Pain management  Pulm: Encourage coughing, deep breathing and use of incentive spirometry. Wean off supplemental oxygen as able. Daily CXR.   Cardio: Monitor telemetry/alarms  GI: Tolerating diet. Continue stool softeners.  Renal: Monitor urine output, supplement electrolytes as needed  Vasc: Heparin SC/SCDs for DVT prophylaxis  Heme: Stable H/H.  ID: Off antibiotics. Stable.  Therapy: OOB/ambulate  Tubes: Monitor Chest tube output  Disposition: Aim to D/C to home on    Discussed with Cardiothoracic Team at AM rounds.    Shannon KENNEDY  Thoracic Surgery   #[phone]      Subjective: Patient with N/V today. C/O RUE PIV insertion site pain and numbness to R 3rd finger. Otherwise doing well. Started on PO Eliquis.     Vital Signs:  Vital Signs Last 24 Hrs  T(C): 37.2 (08-18-23 @ 08:15), Max: 37.4 (08-18-23 @ 00:20)  T(F): 99 (08-18-23 @ 08:15), Max: 99.3 (08-18-23 @ 00:20)  HR: 57 (08-18-23 @ 08:15) (51 - 68)  BP: 140/85 (08-18-23 @ 08:15) (138/80 - 149/83)  RR: 18 (08-18-23 @ 08:15) (16 - 18)  SpO2: 98% (08-18-23 @ 08:15) (98% - 100%) on (O2)      General: NAD  Neurology: Awake, nonfocal, COLEMAN x 4  Eyes: EOMI, Gross vision intact  ENT: Gross hearing intact, grossly patent pharynx, no stridor  Neck: Neck supple, trachea midline, No JVD  Respiratory: CTA B/L, No wheezing, rales, rhonchi  CV: RRR, S1S2, no murmurs, rubs or gallops  Abdominal: Soft, NT, ND  Extremities: No apparent, edema, good cap refil in B/L UE   Skin: No Rashes, Hematoma, Ecchymosis    Relevant labs, radiology and Medications reviewed                        11.1   8.02  )-----------( 176      ( 18 Aug 2023 06:41 )             31.6     08-18    143  |  113<H>  |  17  ----------------------------<  95  4.1   |  24  |  2.44<H>    Ca    8.4<L>      18 Aug 2023 06:41  Phos  3.5     08-18  Mg     2.0     08-18      PT/INR - ( 18 Aug 2023 06:41 )   PT: 13.6 sec;   INR: 1.21 ratio         PTT - ( 18 Aug 2023 06:41 )  PTT:78.2 sec  MEDICATIONS  (STANDING):  metoclopramide Injectable 10 milliGRAM(s) IV Push once  pantoprazole    Tablet 40 milliGRAM(s) Oral before breakfast  senna 2 Tablet(s) Oral at bedtime  sodium chloride 0.45%. 1000 milliLiter(s) (100 mL/Hr) IV Continuous <Continuous>    MEDICATIONS  (PRN):  acetaminophen     Tablet .. 975 milliGRAM(s) Oral every 8 hours PRN Temp greater or equal to 38C (100.4F), Mild Pain (1 - 3)  acetaminophen   IVPB .. 1000 milliGRAM(s) IV Intermittent once PRN Temp greater or equal to 38C (100.4F), Mild Pain (1 - 3)  aluminum hydroxide/magnesium hydroxide/simethicone Suspension 30 milliLiter(s) Oral every 4 hours PRN Dyspepsia  bisacodyl 5 milliGRAM(s) Oral every 12 hours PRN Constipation  melatonin 3 milliGRAM(s) Oral at bedtime PRN Insomnia  ondansetron Injectable 4 milliGRAM(s) IV Push every 8 hours PRN Nausea and/or Vomiting  oxyCODONE    IR 5 milliGRAM(s) Oral every 4 hours PRN Moderate Pain (4 - 6)        Assessment  27y Male  w/ PAST MEDICAL & SURGICAL HISTORY:  No pertinent past medical history      No significant past surgical history      admitted with complaints of Patient is a 27y old  Male who presents with a chief complaint of Acute Left Upper Ext DVT (18 Aug 2023 05:50)

## 2023-08-18 NOTE — PROGRESS NOTE ADULT - SUBJECTIVE AND OBJECTIVE BOX
SURGERY DAILY PROGRESS NOTE:     Subjective:  Patient seen and examined at bedside during am rounds. Denies any LUE discomfort at this time. Swelling improved. Hydrating himself PO. Pt denies any vomiting episodes yesterday. Procedure was cancelled yesterday due to kidney function concern during perioperative period. Hep dripp was increased to 17unit. No acute overnight events  AFVSS. Denies any fevers, chills, n/v/d, chest pain or shortness of breath    Objective:    Vital Signs Last 24 Hrs  T(C): 37.4 (18 Aug 2023 00:20), Max: 37.4 (17 Aug 2023 06:00)  T(F): 99.3 (18 Aug 2023 00:20), Max: 99.3 (17 Aug 2023 06:00)  HR: 68 (18 Aug 2023 00:20) (51 - 68)  BP: 138/80 (18 Aug 2023 00:20) (128/86 - 151/86)  BP(mean): 102 (17 Aug 2023 21:00) (91 - 104)  RR: 16 (18 Aug 2023 00:20) (16 - 16)  SpO2: 99% (18 Aug 2023 00:20) (96% - 100%)    Parameters below as of 18 Aug 2023 00:20  Patient On (Oxygen Delivery Method): room air        Labs:                                10.8   9.78  )-----------( 170      ( 18 Aug 2023 01:04 )             30.5     CBC Full  -  ( 18 Aug 2023 01:04 )  WBC Count : 9.78 K/uL  RBC Count : 3.58 M/uL  Hemoglobin : 10.8 g/dL  Hematocrit : 30.5 %  Platelet Count - Automated : 170 K/uL  Mean Cell Volume : 85.2 fl  Mean Cell Hemoglobin : 30.2 pg  Mean Cell Hemoglobin Concentration : 35.4 gm/dL  Auto Neutrophil # : x  Auto Lymphocyte # : x  Auto Monocyte # : x  Auto Eosinophil # : x  Auto Basophil # : x  Auto Neutrophil % : x  Auto Lymphocyte % : x  Auto Monocyte % : x  Auto Eosinophil % : x  Auto Basophil % : x    08-17    143  |  115<H>  |  19  ----------------------------<  93  4.1   |  22  |  2.74<H>    Ca    8.4<L>      17 Aug 2023 06:14  Phos  3.6     08-17  Mg     2.1     08-17        PT/INR - ( 17 Aug 2023 23:28 )   PT: 13.6 sec;   INR: 1.21 ratio         PTT - ( 18 Aug 2023 01:04 )  PTT:86.3 sec      Meds:  acetaminophen     Tablet .. 975 milliGRAM(s) Oral every 8 hours PRN  acetaminophen   IVPB .. 1000 milliGRAM(s) IV Intermittent once PRN  aluminum hydroxide/magnesium hydroxide/simethicone Suspension 30 milliLiter(s) Oral every 4 hours PRN  bisacodyl 5 milliGRAM(s) Oral every 12 hours PRN  heparin   Injectable 6500 Unit(s) IV Push every 6 hours PRN  heparin   Injectable 3000 Unit(s) IV Push every 6 hours PRN  heparin  Infusion. 1700 Unit(s)/Hr IV Continuous <Continuous>  melatonin 3 milliGRAM(s) Oral at bedtime PRN  metoclopramide Injectable 10 milliGRAM(s) IV Push once  ondansetron Injectable 4 milliGRAM(s) IV Push every 8 hours PRN  oxyCODONE    IR 5 milliGRAM(s) Oral every 4 hours PRN  pantoprazole    Tablet 40 milliGRAM(s) Oral before breakfast  senna 2 Tablet(s) Oral at bedtime  sodium chloride 0.45%. 1000 milliLiter(s) IV Continuous <Continuous>      PHYSICAL EXAM   Gen: well-appearing, in no acute distress  CV: pulse regularly present   Resp: airway patent, non-labored breathing  Abd: soft, non tender, non distended  Extremities: left hand swelling improved, minimal erythema, mild ecchymosis in antecubital fossa resolving and non tender, capillary refill <2 sec, full ROM intact. Radial pulse +2

## 2023-08-18 NOTE — PROGRESS NOTE ADULT - ASSESSMENT
27y Male  presenting with LUE Axillary vein and subclavian vein DVT with Thoracic outlet syndrome. s/p LUE thrombectomy on 8/9 in IR. Post op course complicated by recurrence of DVT in the axillary subclavian and basilic veins in the immediate postop period. S/p angiojet thrombectomy of LUE 8/10. Now on heparin gtt. Called for evaluation for L 1st rib resection. Hosp course complicated by contrast induced CORNELIO.     PLAN  OR delayed until next month (planing for 9/7/23) in order to allow kidney function to further improve and decrease risk of repeat thrombectomy with contrast load this admission while patient off AC secondary during miguel a-op period  Transitioned to PO AC today  Monitor for signs of Subclavian DVT recurrence over the next 48H  No further needs from a pre-surgical perspective (no outpatient labs, office visit or imaging)  Trend Cr  Renal following  Admitted to Vascular   Future Heme workup as outpatient   D/W Dr. Harris

## 2023-08-18 NOTE — PROGRESS NOTE ADULT - SUBJECTIVE AND OBJECTIVE BOX
27 year old with no past medical hx of clots presenting w acute LUE swelling found w  Acute Thrombis LUE and MRI suspicious for thoracic outlet syndrome   pt required acute clot thrombolysis w high clot load ~ 280 cc of clot , pt states she was urinating dark after procedure . pt states he is now urinating clear urine.  Denies any flank pains. pt also had multiple contrast load  with CTA chest and  venograms      feeling better -    was planned for rib resection now delayed till next month as per CTS     feeling better   eating better        PAST MEDICAL & SURGICAL HISTORY:  No pertinent past medical history    No significant past surgical history    MEDICATIONS  (STANDING):  apixaban 10 milliGRAM(s) Oral every 12 hours  metoclopramide Injectable 10 milliGRAM(s) IV Push once  pantoprazole    Tablet 40 milliGRAM(s) Oral before breakfast  senna 2 Tablet(s) Oral at bedtime  sodium chloride 0.45%. 1000 milliLiter(s) (50 mL/Hr) IV Continuous <Continuous>          Allergies    No Known Allergies    Intolerances      Vital Signs Last 24 Hrs  T(C): 36.8 (18 Aug 2023 16:04), Max: 37.4 (18 Aug 2023 00:20)  T(F): 98.2 (18 Aug 2023 16:04), Max: 99.3 (18 Aug 2023 00:20)  HR: 56 (18 Aug 2023 16:04) (56 - 68)  BP: 134/93 (18 Aug 2023 16:04) (134/93 - 140/85)  BP(mean): 106 (18 Aug 2023 16:04) (106 - 106)  RR: 18 (18 Aug 2023 16:04) (16 - 18)  SpO2: 100% (18 Aug 2023 16:04) (98% - 100%)    Parameters below as of 18 Aug 2023 16:04  Patient On (Oxygen Delivery Method): room air    I&O's Detail    17 Aug 2023 07:01  -  18 Aug 2023 07:00  --------------------------------------------------------  IN:  Total IN: 0 mL    OUT:    Voided (mL): 2000 mL  Total OUT: 2000 mL    Total NET: -2000 mL      PHYSICAL EXAM:    Constitutional: NAD  HEENT: , EOMI,  MM  Neck: No LAD, No JVD  Respiratory: CTAB  Cardiovascular: S1 and S2  Gastrointestinal: BS+, soft, NT/ND   Extremities: No peripheral edema, LUE edema mild , move all ext   Neurological: A/O x 3, no focal deficits  : No Damico  Skin: No rashes  Access: Not applicable    LABS:                                            11.1   8.02  )-----------( 176      ( 18 Aug 2023 06:41 )             31.6                         10.8   9.78  )-----------( 170      ( 18 Aug 2023 01:04 )             30.5             143    |  113    |  17     ----------------------------<  95        18 Aug 2023 06:41  4.1     |  24     |  2.44     143    |  115    |  19     ----------------------------<  93        17 Aug 2023 06:14  4.1     |  22     |  2.74     141    |  113    |  24     ----------------------------<  91        16 Aug 2023 06:02  4.1     |  24     |  3.24     Ca    8.4        18 Aug 2023 06:41  Ca    8.4        17 Aug 2023 06:14    Phos  3.5       18 Aug 2023 06:41  Phos  3.6       17 Aug 2023 06:14    Mg     2.0       18 Aug 2023 06:41  Mg     2.1       17 Aug 2023 06:14    TPro  6.3    /  Alb  3.1    /  TBili  0.5    /        15 Aug 2023 07:48  DBili  0.1    /  AST  53     /  ALT  94     /  AlkPhos  52             Urine Microscopic-Add On (NC) (08.10.23 @ 17:46)   Red Blood Cell - Urine: 6-10 /HPF  White Blood Cell - Urine: 0-2 /HPF  Bacteria: Moderate  Comment - Urine: moderate hyphae-like cells seen  Squamous Epithelial Cells: Negative    Urinalysis (08.10.23 @ 17:46)   Glucose Qualitative, Urine: Negative  Blood, Urine: Large  pH Urine: 8.0  Color: Brown  Urine Appearance: Clear  Bilirubin: Negative  Ketone - Urine: Trace  Specific Gravity: 1.010  Protein, Urine: 100  Urobilinogen: Negative  Nitrite: Positive  Leukocyte Esterase Concentration: Trace          RADIOLOGY & ADDITIONAL STUDIES:

## 2023-08-19 LAB
ANION GAP SERPL CALC-SCNC: 5 MMOL/L — SIGNIFICANT CHANGE UP (ref 5–17)
APTT BLD: 35 SEC — SIGNIFICANT CHANGE UP (ref 24.5–35.6)
BASOPHILS # BLD AUTO: 0.01 K/UL — SIGNIFICANT CHANGE UP (ref 0–0.2)
BASOPHILS NFR BLD AUTO: 0.1 % — SIGNIFICANT CHANGE UP (ref 0–2)
BUN SERPL-MCNC: 16 MG/DL — SIGNIFICANT CHANGE UP (ref 7–23)
CALCIUM SERPL-MCNC: 8.8 MG/DL — SIGNIFICANT CHANGE UP (ref 8.5–10.1)
CHLORIDE SERPL-SCNC: 113 MMOL/L — HIGH (ref 96–108)
CO2 SERPL-SCNC: 26 MMOL/L — SIGNIFICANT CHANGE UP (ref 22–31)
CREAT SERPL-MCNC: 2.27 MG/DL — HIGH (ref 0.5–1.3)
EGFR: 40 ML/MIN/1.73M2 — LOW
EOSINOPHIL # BLD AUTO: 0.13 K/UL — SIGNIFICANT CHANGE UP (ref 0–0.5)
EOSINOPHIL NFR BLD AUTO: 1.8 % — SIGNIFICANT CHANGE UP (ref 0–6)
GLUCOSE SERPL-MCNC: 88 MG/DL — SIGNIFICANT CHANGE UP (ref 70–99)
HCT VFR BLD CALC: 31.7 % — LOW (ref 39–50)
HGB BLD-MCNC: 11 G/DL — LOW (ref 13–17)
IMM GRANULOCYTES NFR BLD AUTO: 0.4 % — SIGNIFICANT CHANGE UP (ref 0–0.9)
INR BLD: 1.59 RATIO — HIGH (ref 0.85–1.18)
LYMPHOCYTES # BLD AUTO: 0.84 K/UL — LOW (ref 1–3.3)
LYMPHOCYTES # BLD AUTO: 11.4 % — LOW (ref 13–44)
MAGNESIUM SERPL-MCNC: 2 MG/DL — SIGNIFICANT CHANGE UP (ref 1.6–2.6)
MCHC RBC-ENTMCNC: 29.6 PG — SIGNIFICANT CHANGE UP (ref 27–34)
MCHC RBC-ENTMCNC: 34.7 GM/DL — SIGNIFICANT CHANGE UP (ref 32–36)
MCV RBC AUTO: 85.4 FL — SIGNIFICANT CHANGE UP (ref 80–100)
MONOCYTES # BLD AUTO: 0.75 K/UL — SIGNIFICANT CHANGE UP (ref 0–0.9)
MONOCYTES NFR BLD AUTO: 10.1 % — SIGNIFICANT CHANGE UP (ref 2–14)
NEUTROPHILS # BLD AUTO: 5.63 K/UL — SIGNIFICANT CHANGE UP (ref 1.8–7.4)
NEUTROPHILS NFR BLD AUTO: 76.2 % — SIGNIFICANT CHANGE UP (ref 43–77)
PHOSPHATE SERPL-MCNC: 4.2 MG/DL — SIGNIFICANT CHANGE UP (ref 2.5–4.5)
PLATELET # BLD AUTO: 190 K/UL — SIGNIFICANT CHANGE UP (ref 150–400)
POTASSIUM SERPL-MCNC: 4.1 MMOL/L — SIGNIFICANT CHANGE UP (ref 3.5–5.3)
POTASSIUM SERPL-SCNC: 4.1 MMOL/L — SIGNIFICANT CHANGE UP (ref 3.5–5.3)
PROTHROM AB SERPL-ACNC: 17.7 SEC — HIGH (ref 9.5–13)
RBC # BLD: 3.71 M/UL — LOW (ref 4.2–5.8)
RBC # FLD: 12 % — SIGNIFICANT CHANGE UP (ref 10.3–14.5)
SODIUM SERPL-SCNC: 144 MMOL/L — SIGNIFICANT CHANGE UP (ref 135–145)
WBC # BLD: 7.39 K/UL — SIGNIFICANT CHANGE UP (ref 3.8–10.5)
WBC # FLD AUTO: 7.39 K/UL — SIGNIFICANT CHANGE UP (ref 3.8–10.5)

## 2023-08-19 PROCEDURE — 99232 SBSQ HOSP IP/OBS MODERATE 35: CPT

## 2023-08-19 RX ADMIN — APIXABAN 10 MILLIGRAM(S): 2.5 TABLET, FILM COATED ORAL at 09:01

## 2023-08-19 RX ADMIN — APIXABAN 10 MILLIGRAM(S): 2.5 TABLET, FILM COATED ORAL at 21:00

## 2023-08-19 NOTE — PROGRESS NOTE ADULT - SUBJECTIVE AND OBJECTIVE BOX
27 year old with no past medical hx of clots presenting w acute LUE swelling found w  Acute Thrombis LUE and MRI suspicious for thoracic outlet syndrome   pt required acute clot thrombolysis w high clot load ~ 280 cc of clot , pt states she was urinating dark after procedure . pt states he is now urinating clear urine.  Denies any flank pains. pt also had multiple contrast load  with CTA chest and  venograms      feeling better -    was planned for rib resection now delayed till next month as per CTS     feeling better   eating better    8/19  some ankle swelling, eating well w occasional fullness  No sob  possible dc in am          PAST MEDICAL & SURGICAL HISTORY:  No pertinent past medical history    No significant past surgical history    MEDICATIONS  (STANDING):  apixaban 10 milliGRAM(s) Oral every 12 hours  metoclopramide Injectable 10 milliGRAM(s) IV Push once  pantoprazole    Tablet 40 milliGRAM(s) Oral before breakfast  senna 2 Tablet(s) Oral at bedtime  sodium chloride 0.45%. 1000 milliLiter(s) (50 mL/Hr) IV Continuous <Continuous>          Allergies    No Known Allergies    Intolerances      I&O's Detail      Vital Signs Last 24 Hrs  T(C): 36.8 (19 Aug 2023 09:02), Max: 37.1 (19 Aug 2023 04:25)  T(F): 98.3 (19 Aug 2023 09:02), Max: 98.7 (19 Aug 2023 04:25)  HR: 58 (19 Aug 2023 09:02) (58 - 58)  BP: 143/86 (19 Aug 2023 09:02) (137/78 - 143/86)  BP(mean): --  RR: 16 (19 Aug 2023 09:02) (16 - 18)  SpO2: 99% (19 Aug 2023 09:02) (97% - 99%)    Parameters below as of 19 Aug 2023 09:02  Patient On (Oxygen Delivery Method): room air    PHYSICAL EXAM:    Constitutional: NAD  HEENT: , EOMI,  MM  Neck: No LAD, No JVD  Respiratory: CTAB  Cardiovascular: S1 and S2  Gastrointestinal: BS+, soft, NT/ND   Extremities: mild peripheral edema, LUE edema mild , move all ext   Neurological: A/O x 3, no focal deficits  : No Damico  Skin: No rashes  Access: Not applicable    LABS:                            11.0   7.39  )-----------( 190      ( 19 Aug 2023 07:08 )             31.7                                             11.1   8.02  )-----------( 176      ( 18 Aug 2023 06:41 )             31.6                         10.8   9.78  )-----------( 170      ( 18 Aug 2023 01:04 )             30.5         08-19    144  |  113<H>  |  16  ----------------------------<  88  4.1   |  26  |  2.27<H>    Ca    8.8      19 Aug 2023 07:08  Phos  4.2     08-19  Mg     2.0     08-19        143    |  113    |  17     ----------------------------<  95        18 Aug 2023 06:41  4.1     |  24     |  2.44     143    |  115    |  19     ----------------------------<  93        17 Aug 2023 06:14  4.1     |  22     |  2.74     141    |  113    |  24     ----------------------------<  91        16 Aug 2023 06:02  4.1     |  24     |  3.24     Ca    8.4        18 Aug 2023 06:41  Ca    8.4        17 Aug 2023 06:14    Phos  3.5       18 Aug 2023 06:41  Phos  3.6       17 Aug 2023 06:14    Mg     2.0       18 Aug 2023 06:41  Mg     2.1       17 Aug 2023 06:14    TPro  6.3    /  Alb  3.1    /  TBili  0.5    /        15 Aug 2023 07:48  DBili  0.1    /  AST  53     /  ALT  94     /  AlkPhos  52             Urine Microscopic-Add On (NC) (08.10.23 @ 17:46)   Red Blood Cell - Urine: 6-10 /HPF  White Blood Cell - Urine: 0-2 /HPF  Bacteria: Moderate  Comment - Urine: moderate hyphae-like cells seen  Squamous Epithelial Cells: Negative    Urinalysis (08.10.23 @ 17:46)   Glucose Qualitative, Urine: Negative  Blood, Urine: Large  pH Urine: 8.0  Color: Brown  Urine Appearance: Clear  Bilirubin: Negative  Ketone - Urine: Trace  Specific Gravity: 1.010  Protein, Urine: 100  Urobilinogen: Negative  Nitrite: Positive  Leukocyte Esterase Concentration: Trace          RADIOLOGY & ADDITIONAL STUDIES:

## 2023-08-19 NOTE — PROGRESS NOTE ADULT - ASSESSMENT
A:    27M  HD # 15  FULL CODE    Here for:    1. LUE TOS  2. LUE DVT  3. CORNELIO    P:    Continue anticoagulation per medicine  Continue mgmt vascular pathologies per vascular recs  CORNELIO mgmt per renal  Plan for surgery to correct TOS on 9/7    Dispo: Cont care.

## 2023-08-19 NOTE — PROGRESS NOTE ADULT - SUBJECTIVE AND OBJECTIVE BOX
SURGERY DAILY PROGRESS NOTE:     Subjective:  Patient seen and examined at bedside during am rounds. Denies any LUE discomfort at this time. Swelling significantly improved. Hydrating himself PO. Pt denies any vomiting episodes yesterday and tolerating diet. Started eliquis yesterday. No acute overnight events  AFVSS. Denies any fevers, chills, n/v/d, chest pain or shortness of breath    Objective:    Vital Signs Last 24 Hrs  T(C): 37.1 (19 Aug 2023 04:25), Max: 37.2 (18 Aug 2023 08:15)  T(F): 98.7 (19 Aug 2023 04:25), Max: 99 (18 Aug 2023 08:15)  HR: 58 (19 Aug 2023 04:25) (56 - 58)  BP: 137/78 (19 Aug 2023 04:25) (134/93 - 140/85)  BP(mean): 106 (18 Aug 2023 16:04) (106 - 106)  RR: 18 (19 Aug 2023 04:25) (18 - 18)  SpO2: 97% (19 Aug 2023 04:25) (97% - 100%)    Parameters below as of 19 Aug 2023 04:25  Patient On (Oxygen Delivery Method): room air        Labs:                                11.1   8.02  )-----------( 176      ( 18 Aug 2023 06:41 )             31.6     CBC Full  -  ( 18 Aug 2023 06:41 )  WBC Count : 8.02 K/uL  RBC Count : 3.71 M/uL  Hemoglobin : 11.1 g/dL  Hematocrit : 31.6 %  Platelet Count - Automated : 176 K/uL  Mean Cell Volume : 85.2 fl  Mean Cell Hemoglobin : 29.9 pg  Mean Cell Hemoglobin Concentration : 35.1 gm/dL  Auto Neutrophil # : 5.82 K/uL  Auto Lymphocyte # : 1.10 K/uL  Auto Monocyte # : 0.94 K/uL  Auto Eosinophil # : 0.10 K/uL  Auto Basophil # : 0.02 K/uL  Auto Neutrophil % : 72.7 %  Auto Lymphocyte % : 13.7 %  Auto Monocyte % : 11.7 %  Auto Eosinophil % : 1.2 %  Auto Basophil % : 0.2 %    08-18    143  |  113<H>  |  17  ----------------------------<  95  4.1   |  24  |  2.44<H>    Ca    8.4<L>      18 Aug 2023 06:41  Phos  3.5     08-18  Mg     2.0     08-18        PT/INR - ( 18 Aug 2023 06:41 )   PT: 13.6 sec;   INR: 1.21 ratio         PTT - ( 18 Aug 2023 06:41 )  PTT:78.2 sec      Meds:  acetaminophen     Tablet .. 975 milliGRAM(s) Oral every 8 hours PRN  acetaminophen   IVPB .. 1000 milliGRAM(s) IV Intermittent once PRN  aluminum hydroxide/magnesium hydroxide/simethicone Suspension 30 milliLiter(s) Oral every 4 hours PRN  apixaban 10 milliGRAM(s) Oral every 12 hours  bisacodyl 5 milliGRAM(s) Oral every 12 hours PRN  melatonin 3 milliGRAM(s) Oral at bedtime PRN  metoclopramide Injectable 10 milliGRAM(s) IV Push once  ondansetron Injectable 4 milliGRAM(s) IV Push every 8 hours PRN  oxyCODONE    IR 5 milliGRAM(s) Oral every 4 hours PRN  pantoprazole    Tablet 40 milliGRAM(s) Oral before breakfast  senna 2 Tablet(s) Oral at bedtime  sodium chloride 0.45%. 1000 milliLiter(s) IV Continuous <Continuous>      PHYSICAL EXAM   Gen: well-appearing, in no acute distress  CV: pulse regularly present   Resp: airway patent, non-labored breathing  Abd: soft, non tender, non distended  Extremities: left hand swelling improved, minimal erythema,  ecchymosis in antecubital fossa resolving and non tender, capillary refill <2 sec, full ROM intact. Radial pulse +2

## 2023-08-19 NOTE — PROGRESS NOTE ADULT - ASSESSMENT
27yoM presenting with LUE Axillary vein and subclavian vein DVT. Thoracic outlet syndrome   s/p LUE thrombectomy on 8/9 . Post op course complicated by recurrence of DVT in the axillary subclavian and basilic veins in the immediate postop s/p angiojet thrombectomy of LUE 8/10  CORNELIO. Nephrology on board  US abd for abdominal pain showed ascites and b/L pleural eff and US doppler: neg. KUB done to r/ ileus  Procedure postponed as outpatient for 1st rib resection will be done outpatient      Plan:  1st rib resection by cardiothoracic surgery postponed to 9/7/23  Pain control and nausea control PRN  On eliquis since yesterday  Continue Arm elevation  Nephrology on board evaluation of CORNELIO  Daily labs  Dispo planning    Will discuss plan with Dr. Kim

## 2023-08-19 NOTE — PROGRESS NOTE ADULT - ASSESSMENT
25 yo male with no past medical hx presenting with acute LUE swelling found with acute subclavian clot and possible thoracic outlet syndrome    CORNELIO  - mutlifactorial    Contrast nephropathy +/- heme pigment injury post large clot thrombolysis load and multiple contrast loads    Cr improving - continue IVF - continue to taper down  - 50/hr - if cr improves can likely DC IVF in AM    check AM labs    renal vein doppler  neg for thriombus/emboli  was neg    continue to avoid  further contrast load unless emergent    **- if contrast required in future will need to await recovery and IV prehdyration    strict I/o   no NSAID   avoid hypotension SBP < 110, no IV contrast, no NSAID's post     THoracic Outlet syndrome   rib resection timing as per CTS     if DC home fup w labs  next week - script given to mother    Dr Freeman covering weekend      Dr Smith to round next week     d/w pt and family at bedside (mother -  Christen RN ) at length        8/19   For dc hopefully in am  creat cont to improve  DC IVF  Po hydrate

## 2023-08-19 NOTE — PROGRESS NOTE ADULT - SUBJECTIVE AND OBJECTIVE BOX
CTS Progress Note    HPI:    S:    Pt seen and examined  HD # 15  FULL CODE  PMHx none  presenting with LUE Axillary vein and subclavian vein DVT with Thoracic outlet syndrome. s/p LUE thrombectomy on 8/9 in IR. Post op course complicated by recurrence of DVT in the axillary subclavian and basilic veins in the immediate postop period. S/p angiojet thrombectomy of LUE 8/10. Now on heparin gtt. Called for evaluation for L 1st rib resection. Hosp course complicated by contrast induced CORNELIO.     8/19 AM: Started on oral AC, off heparin drip. Gentle hydration. CORNELIO improving. Overall feeling better.    ROS: + LUE swelling, improved  Remainder of systems reviewed, negative    Allergies    No Known Allergies    Intolerances    MEDICATIONS  (STANDING):    apixaban 10 milliGRAM(s) Oral every 12 hours  metoclopramide Injectable 10 milliGRAM(s) IV Push once  pantoprazole    Tablet 40 milliGRAM(s) Oral before breakfast  senna 2 Tablet(s) Oral at bedtime  sodium chloride 0.45%. 1000 milliLiter(s) (50 mL/Hr) IV Continuous <Continuous>    MEDICATIONS  (PRN):  acetaminophen     Tablet .. 975 milliGRAM(s) Oral every 8 hours PRN Temp greater or equal to 38C (100.4F), Mild Pain (1 - 3)  acetaminophen   IVPB .. 1000 milliGRAM(s) IV Intermittent once PRN Temp greater or equal to 38C (100.4F), Mild Pain (1 - 3)  aluminum hydroxide/magnesium hydroxide/simethicone Suspension 30 milliLiter(s) Oral every 4 hours PRN Dyspepsia  bisacodyl 5 milliGRAM(s) Oral every 12 hours PRN Constipation  melatonin 3 milliGRAM(s) Oral at bedtime PRN Insomnia  ondansetron Injectable 4 milliGRAM(s) IV Push every 8 hours PRN Nausea and/or Vomiting  oxyCODONE    IR 5 milliGRAM(s) Oral every 4 hours PRN Moderate Pain (4 - 6)      Drug Dosing Weight  Height (cm): 182.9 (05 Aug 2023 23:03)  Weight (kg): 79.9 (17 Aug 2023 10:45)  BMI (kg/m2): 23.9 (17 Aug 2023 10:45)  BSA (m2): 2.02 (17 Aug 2023 10:45)    PAST MEDICAL & SURGICAL HISTORY:  No pertinent past medical history      No significant past surgical history          FAMILY HISTORY:  No pertinent family history in first degree relatives        ROS: See HPI; otherwise, all systems reviewed and negative.    O:    ICU Vital Signs Last 24 Hrs  T(C): 37.1 (19 Aug 2023 04:25), Max: 37.2 (18 Aug 2023 08:15)  T(F): 98.7 (19 Aug 2023 04:25), Max: 99 (18 Aug 2023 08:15)  HR: 58 (19 Aug 2023 04:25) (56 - 58)  BP: 137/78 (19 Aug 2023 04:25) (134/93 - 140/85)  BP(mean): 106 (18 Aug 2023 16:04) (106 - 106)  ABP: --  ABP(mean): --  RR: 18 (19 Aug 2023 04:25) (18 - 18)  SpO2: 97% (19 Aug 2023 04:25) (97% - 100%)    O2 Parameters below as of 19 Aug 2023 04:25  Patient On (Oxygen Delivery Method): room air    I&O's Detail    PE:    Adult M lying in bed  No JVD trachea midline  + LUE reduced swelling  1+ edema B/L LE  Awake and alert    LABS:    CBC Full  -  ( 18 Aug 2023 06:41 )  WBC Count : 8.02 K/uL  RBC Count : 3.71 M/uL  Hemoglobin : 11.1 g/dL  Hematocrit : 31.6 %  Platelet Count - Automated : 176 K/uL  Mean Cell Volume : 85.2 fl  Mean Cell Hemoglobin : 29.9 pg  Mean Cell Hemoglobin Concentration : 35.1 gm/dL  Auto Neutrophil # : 5.82 K/uL  Auto Lymphocyte # : 1.10 K/uL  Auto Monocyte # : 0.94 K/uL  Auto Eosinophil # : 0.10 K/uL  Auto Basophil # : 0.02 K/uL  Auto Neutrophil % : 72.7 %  Auto Lymphocyte % : 13.7 %  Auto Monocyte % : 11.7 %  Auto Eosinophil % : 1.2 %  Auto Basophil % : 0.2 %    08-18    143  |  113<H>  |  17  ----------------------------<  95  4.1   |  24  |  2.44<H>    Ca    8.4<L>      18 Aug 2023 06:41  Phos  3.5     08-18  Mg     2.0     08-18      PT/INR - ( 18 Aug 2023 06:41 )   PT: 13.6 sec;   INR: 1.21 ratio         PTT - ( 18 Aug 2023 06:41 )  PTT:78.2 sec  Urinalysis Basic - ( 18 Aug 2023 06:41 )    Color: x / Appearance: x / SG: x / pH: x  Gluc: 95 mg/dL / Ketone: x  / Bili: x / Urobili: x   Blood: x / Protein: x / Nitrite: x   Leuk Esterase: x / RBC: x / WBC x   Sq Epi: x / Non Sq Epi: x / Bacteria: x      CAPILLARY BLOOD GLUCOSE

## 2023-08-20 ENCOUNTER — TRANSCRIPTION ENCOUNTER (OUTPATIENT)
Age: 28
End: 2023-08-20

## 2023-08-20 VITALS
TEMPERATURE: 98 F | RESPIRATION RATE: 16 BRPM | HEART RATE: 55 BPM | DIASTOLIC BLOOD PRESSURE: 80 MMHG | SYSTOLIC BLOOD PRESSURE: 133 MMHG | OXYGEN SATURATION: 99 %

## 2023-08-20 LAB
ANION GAP SERPL CALC-SCNC: 5 MMOL/L — SIGNIFICANT CHANGE UP (ref 5–17)
BASOPHILS # BLD AUTO: 0.02 K/UL — SIGNIFICANT CHANGE UP (ref 0–0.2)
BASOPHILS NFR BLD AUTO: 0.3 % — SIGNIFICANT CHANGE UP (ref 0–2)
BUN SERPL-MCNC: 17 MG/DL — SIGNIFICANT CHANGE UP (ref 7–23)
CALCIUM SERPL-MCNC: 9.3 MG/DL — SIGNIFICANT CHANGE UP (ref 8.5–10.1)
CHLORIDE SERPL-SCNC: 111 MMOL/L — HIGH (ref 96–108)
CO2 SERPL-SCNC: 27 MMOL/L — SIGNIFICANT CHANGE UP (ref 22–31)
CREAT SERPL-MCNC: 2.18 MG/DL — HIGH (ref 0.5–1.3)
EGFR: 42 ML/MIN/1.73M2 — LOW
EOSINOPHIL # BLD AUTO: 0.17 K/UL — SIGNIFICANT CHANGE UP (ref 0–0.5)
EOSINOPHIL NFR BLD AUTO: 2.2 % — SIGNIFICANT CHANGE UP (ref 0–6)
GLUCOSE SERPL-MCNC: 92 MG/DL — SIGNIFICANT CHANGE UP (ref 70–99)
HCT VFR BLD CALC: 34.7 % — LOW (ref 39–50)
HGB BLD-MCNC: 12.3 G/DL — LOW (ref 13–17)
IMM GRANULOCYTES NFR BLD AUTO: 0.6 % — SIGNIFICANT CHANGE UP (ref 0–0.9)
LYMPHOCYTES # BLD AUTO: 1 K/UL — SIGNIFICANT CHANGE UP (ref 1–3.3)
LYMPHOCYTES # BLD AUTO: 12.7 % — LOW (ref 13–44)
MAGNESIUM SERPL-MCNC: 2.1 MG/DL — SIGNIFICANT CHANGE UP (ref 1.6–2.6)
MCHC RBC-ENTMCNC: 30.1 PG — SIGNIFICANT CHANGE UP (ref 27–34)
MCHC RBC-ENTMCNC: 35.4 GM/DL — SIGNIFICANT CHANGE UP (ref 32–36)
MCV RBC AUTO: 85 FL — SIGNIFICANT CHANGE UP (ref 80–100)
MONOCYTES # BLD AUTO: 0.76 K/UL — SIGNIFICANT CHANGE UP (ref 0–0.9)
MONOCYTES NFR BLD AUTO: 9.6 % — SIGNIFICANT CHANGE UP (ref 2–14)
NEUTROPHILS # BLD AUTO: 5.88 K/UL — SIGNIFICANT CHANGE UP (ref 1.8–7.4)
NEUTROPHILS NFR BLD AUTO: 74.6 % — SIGNIFICANT CHANGE UP (ref 43–77)
PHOSPHATE SERPL-MCNC: 4.4 MG/DL — SIGNIFICANT CHANGE UP (ref 2.5–4.5)
PLATELET # BLD AUTO: 232 K/UL — SIGNIFICANT CHANGE UP (ref 150–400)
POTASSIUM SERPL-MCNC: 3.9 MMOL/L — SIGNIFICANT CHANGE UP (ref 3.5–5.3)
POTASSIUM SERPL-SCNC: 3.9 MMOL/L — SIGNIFICANT CHANGE UP (ref 3.5–5.3)
RBC # BLD: 4.08 M/UL — LOW (ref 4.2–5.8)
RBC # FLD: 12.1 % — SIGNIFICANT CHANGE UP (ref 10.3–14.5)
SODIUM SERPL-SCNC: 143 MMOL/L — SIGNIFICANT CHANGE UP (ref 135–145)
WBC # BLD: 7.88 K/UL — SIGNIFICANT CHANGE UP (ref 3.8–10.5)
WBC # FLD AUTO: 7.88 K/UL — SIGNIFICANT CHANGE UP (ref 3.8–10.5)

## 2023-08-20 RX ADMIN — APIXABAN 10 MILLIGRAM(S): 2.5 TABLET, FILM COATED ORAL at 09:20

## 2023-08-20 NOTE — PROGRESS NOTE ADULT - SUBJECTIVE AND OBJECTIVE BOX
SURGERY DAILY PROGRESS NOTE:     Subjective:  Patient seen and examined at bedside during am rounds. Denies any LUE discomfort at this time. Swelling significantly improved. Reports numbness in R hand fingers since the 5 Fr IV line was placed in SICU.   Hydrating himself PO. Pt denies any vomiting episodes yesterday and tolerating diet. Continues to be on eliquis. No acute overnight events  AFVSS. Denies any fevers, chills, n/v/d, chest pain or shortness of breath    Objective:    Vital Signs Last 24 Hrs  T(C): 36.8 (19 Aug 2023 21:35), Max: 37.2 (19 Aug 2023 16:47)  T(F): 98.2 (19 Aug 2023 21:35), Max: 99 (19 Aug 2023 16:47)  HR: 57 (19 Aug 2023 21:35) (55 - 58)  BP: 146/94 (19 Aug 2023 21:35) (137/78 - 152/94)  BP(mean): --  RR: 16 (19 Aug 2023 21:35) (16 - 18)  SpO2: 99% (19 Aug 2023 21:35) (97% - 100%)    Parameters below as of 19 Aug 2023 21:35  Patient On (Oxygen Delivery Method): room air          Labs:                            11.0   7.39  )-----------( 190      ( 19 Aug 2023 07:08 )             31.7   08-19    144  |  113<H>  |  16  ----------------------------<  88  4.1   |  26  |  2.27<H>    Ca    8.8      19 Aug 2023 07:08  Phos  4.2     08-19  Mg     2.0     08-19          PHYSICAL EXAM   Gen: well-appearing, in no acute distress  CV: pulse regularly present   Resp: airway patent, non-labored breathing  Abd: soft, non tender, non distended  Extremities: left hand swelling improved, minimal erythema,  ecchymosis in antecubital fossa resolving and non tender, capillary refill <2 sec, full ROM intact. Radial pulse +2

## 2023-08-20 NOTE — PROGRESS NOTE ADULT - PROVIDER SPECIALTY LIST ADULT
Nephrology
Thoracic Surgery
Vascular Surgery
Heme/Onc
Heme/Onc
Nephrology
Thoracic Surgery
Vascular Surgery
Hospitalist
Hospitalist
Nephrology
SICU
Thoracic Surgery
Vascular Surgery
Hospitalist
Vascular Surgery
Critical Care

## 2023-08-20 NOTE — PROGRESS NOTE ADULT - REASON FOR ADMISSION
Acute Left Upper Ext DVT

## 2023-08-20 NOTE — PROGRESS NOTE ADULT - ASSESSMENT
A:    27M  HD # 15  FULL CODE    Here for:    1. LUE TOS  2. LUE DVT  3. CORNELIO    P:    Pt feeling better  Ambulating  Tolerating diet  Swelling LUE improving    Continue anticoagulation per medicine  Continue mgmt vascular pathologies per vascular recs  PIV d/c'd by this author  CORNELIO mgmt per renal; improving  Plan for surgery to correct TOS on 9/7    Dispo: Cont care. OK for d/c home on oral AC with CTS standpoint with follow up with Dr Harris.

## 2023-08-20 NOTE — PROGRESS NOTE ADULT - ASSESSMENT
27yoM presenting with LUE Axillary vein and subclavian vein DVT. Thoracic outlet syndrome   s/p LUE thrombectomy on 8/9 . Post op course complicated by recurrence of DVT in the axillary subclavian and basilic veins in the immediate postop s/p angiojet thrombectomy of LUE 8/10  CORNELIO. Nephrology on board  US abd for abdominal pain showed ascites and b/L pleural eff and US doppler: neg. KUB done to r/ ileus  Procedure postponed as outpatient for 1st rib resection will be done outpatient on 9/7      Plan:  1st rib resection by cardiothoracic surgery postponed to 9/7/23  Pain control and nausea control PRN  continue eliquis  Continue Arm elevation  Daily labs  DC later today with outpatient f/u Dr Kim and Dr Harris and Dr Medrano    Will discuss plan with Dr. Kim

## 2023-08-20 NOTE — PROGRESS NOTE ADULT - SUBJECTIVE AND OBJECTIVE BOX
CTS Progress Note    HPI:    S:    Pt seen and examined  HD # 16  FULL CODE  PMHx none  presenting with LUE Axillary vein and subclavian vein DVT with Thoracic outlet syndrome. s/p LUE thrombectomy on 8/9 in IR. Post op course complicated by recurrence of DVT in the axillary subclavian and basilic veins in the immediate postop period. S/p angiojet thrombectomy of LUE 8/10. Now on heparin gtt. Called for evaluation for L 1st rib resection. Hosp course complicated by contrast induced CORNELIO.     8/19 AM: Started on oral AC, off heparin drip. Gentle hydration. CORNELIO improving. Overall feeling better.  8/20 AM: Remains on DOAC. renal fxn improving, voiding. Ambulating. Feeling better. RUE extended PIV removed. Pt looking forward to discharge.     ROS: + LUE swelling, improved  Remainder of systems reviewed, negative      Allergies    No Known Allergies    Intolerances        MEDICATIONS  (STANDING):  apixaban 10 milliGRAM(s) Oral every 12 hours  metoclopramide Injectable 10 milliGRAM(s) IV Push once  pantoprazole    Tablet 40 milliGRAM(s) Oral before breakfast  senna 2 Tablet(s) Oral at bedtime    MEDICATIONS  (PRN):  acetaminophen     Tablet .. 975 milliGRAM(s) Oral every 8 hours PRN Temp greater or equal to 38C (100.4F), Mild Pain (1 - 3)  acetaminophen   IVPB .. 1000 milliGRAM(s) IV Intermittent once PRN Temp greater or equal to 38C (100.4F), Mild Pain (1 - 3)  aluminum hydroxide/magnesium hydroxide/simethicone Suspension 30 milliLiter(s) Oral every 4 hours PRN Dyspepsia  bisacodyl 5 milliGRAM(s) Oral every 12 hours PRN Constipation  melatonin 3 milliGRAM(s) Oral at bedtime PRN Insomnia  ondansetron Injectable 4 milliGRAM(s) IV Push every 8 hours PRN Nausea and/or Vomiting      Drug Dosing Weight  Height (cm): 182.9 (05 Aug 2023 23:03)  Weight (kg): 79.9 (17 Aug 2023 10:45)  BMI (kg/m2): 23.9 (17 Aug 2023 10:45)  BSA (m2): 2.02 (17 Aug 2023 10:45)      PAST MEDICAL & SURGICAL HISTORY:  No pertinent past medical history      No significant past surgical history          FAMILY HISTORY:  No pertinent family history in first degree relatives            ROS: See HPI; otherwise, all systems reviewed and negative.    O:    ICU Vital Signs Last 24 Hrs  T(C): 36.6 (20 Aug 2023 08:16), Max: 37.2 (19 Aug 2023 16:47)  T(F): 97.9 (20 Aug 2023 08:16), Max: 99 (19 Aug 2023 16:47)  HR: 55 (20 Aug 2023 08:16) (55 - 59)  BP: 133/80 (20 Aug 2023 08:16) (133/80 - 152/94)  BP(mean): --  ABP: --  ABP(mean): --  RR: 16 (20 Aug 2023 08:16) (16 - 16)  SpO2: 99% (20 Aug 2023 08:16) (98% - 100%)    O2 Parameters below as of 20 Aug 2023 08:16  Patient On (Oxygen Delivery Method): room air                I&O's Detail          PE:    Adult M lying in bed  Cranium atraumatic   No JVD trachea midline  + LUE reduced swelling  Edema improved throughout  Awake and alert  Skin pink and perfused    LABS:    CBC Full  -  ( 20 Aug 2023 07:15 )  WBC Count : 7.88 K/uL  RBC Count : 4.08 M/uL  Hemoglobin : 12.3 g/dL  Hematocrit : 34.7 %  Platelet Count - Automated : 232 K/uL  Mean Cell Volume : 85.0 fl  Mean Cell Hemoglobin : 30.1 pg  Mean Cell Hemoglobin Concentration : 35.4 gm/dL  Auto Neutrophil # : 5.88 K/uL  Auto Lymphocyte # : 1.00 K/uL  Auto Monocyte # : 0.76 K/uL  Auto Eosinophil # : 0.17 K/uL  Auto Basophil # : 0.02 K/uL  Auto Neutrophil % : 74.6 %  Auto Lymphocyte % : 12.7 %  Auto Monocyte % : 9.6 %  Auto Eosinophil % : 2.2 %  Auto Basophil % : 0.3 %    08-20    143  |  111<H>  |  17  ----------------------------<  92  3.9   |  27  |  2.18<H>    Ca    9.3      20 Aug 2023 07:15  Phos  4.4     08-20  Mg     2.1     08-20      PT/INR - ( 19 Aug 2023 07:08 )   PT: 17.7 sec;   INR: 1.59 ratio         PTT - ( 19 Aug 2023 07:08 )  PTT:35.0 sec  Urinalysis Basic - ( 20 Aug 2023 07:15 )    Color: x / Appearance: x / SG: x / pH: x  Gluc: 92 mg/dL / Ketone: x  / Bili: x / Urobili: x   Blood: x / Protein: x / Nitrite: x   Leuk Esterase: x / RBC: x / WBC x   Sq Epi: x / Non Sq Epi: x / Bacteria: x      CAPILLARY BLOOD GLUCOSE

## 2023-08-20 NOTE — CHART NOTE - NSCHARTNOTEFT_GEN_A_CORE
Pt seen, results of US reviewed.     Medial left subclavian vein is partially imaged due to rib/clavicle   shadowing artifact, however demonstrates prominent thrombus, image   1.3303. Patent portions of the left mid and lateral subclavian vein   demonstrate decreased phasicity suggestive of central venous clot.   Consider chest CT with IV contrast for further evaluation.    Partially occlusive clot of lateral left subclavian vein and axillary   vein is markedly improved since 8/9/2023. Partially occlusive left   basilic superficial vein clot noted as well.    Called vascular resident to discuss, no further change to heparin gtt and no further imaging ordered at this time. Pt PTT this AM 50, difficulty drawing labs. Pending repeat PTT after adjustment.     Plan for OR on Thursday for 1st rib resection. Dr. Harris to call pt pt mother this evening.
Called to evaluate patient's LUE post-operatively  On exam noted have increased discoloration of hand/forearm with swelling    Patient restarted on heparin drip with bolus  STAT Venous duplex of LUE ordered for further evaluation    Will follow up results      Plan discussed with Dr. Kim
RUE extended length PIV removed w/o difficulty  No undue bleeding  Covered with sterile gauze and loosely wrapped co-flex

## 2023-08-20 NOTE — DISCHARGE NOTE NURSING/CASE MANAGEMENT/SOCIAL WORK - PATIENT PORTAL LINK FT
You can access the FollowMyHealth Patient Portal offered by [facility] by registering at the following website: [email or web address]. By joining [facility]’s FollowMyHealth portal, you will also be able to view your health information using other applications (apps) compatible with our system.

## 2023-08-20 NOTE — DISCHARGE NOTE NURSING/CASE MANAGEMENT/SOCIAL WORK - NSDCPEFALRISK_GEN_ALL_CORE
For information on Fall & Injury Prevention, visit: [email or web address] OR  [email or web address] OR  https://www.cdc.gov/steadi/patient.html

## 2023-08-31 ENCOUNTER — APPOINTMENT (OUTPATIENT)
Dept: VASCULAR SURGERY | Facility: CLINIC | Age: 28
End: 2023-08-31
Payer: COMMERCIAL

## 2023-08-31 ENCOUNTER — TRANSCRIPTION ENCOUNTER (OUTPATIENT)
Age: 28
End: 2023-08-31

## 2023-08-31 VITALS
HEIGHT: 72 IN | OXYGEN SATURATION: 100 % | SYSTOLIC BLOOD PRESSURE: 121 MMHG | WEIGHT: 160 LBS | DIASTOLIC BLOOD PRESSURE: 81 MMHG | HEART RATE: 87 BPM | BODY MASS INDEX: 21.67 KG/M2

## 2023-08-31 PROCEDURE — 93970 EXTREMITY STUDY: CPT

## 2023-08-31 PROCEDURE — 99213 OFFICE O/P EST LOW 20 MIN: CPT

## 2023-09-02 NOTE — ASSESSMENT
[FreeTextEntry1] : 28yo male with left vTOS and acute thrombosis of left subclavian and axillary vein; recovering well from venogram/thrombectomy -plan for definitive repair with 1st rib resection on Thursday 9/7/23 with Dr. Harris from CT surgery; will plan to admit prior to procedure to stop Eliquis and bridge to heparin drip -continue hydration and high protein diet -continue AC with Eliquis

## 2023-09-02 NOTE — HISTORY OF PRESENT ILLNESS
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 Had venogram and thrombectomy done x2 with good result. Currently on eluquis for AC; he is compliant. He overall feels well left arm symptoms completely resolved at this point; denies swelling, aching, fullness, heaviness and pain. He complains of right 3rd digit numbness ever since he's had a midline catheter placed at the hospital. He otherwsie feels well. Had bloodwork done to monitor CORNELIO last Cr 1.5 which is much improved.

## 2023-09-02 NOTE — PHYSICAL EXAM
[JVD] : no jugular venous distention  [Normal Rate and Rhythm] : normal rate and rhythm [Normal Breath Sounds] : Normal breath sounds [2+] : left 2+ [de-identified] : well appearing [de-identified] : wnl [FreeTextEntry1] : no arm edema, left hand warm well perfused 5/5 strength and sensation

## 2023-09-05 ENCOUNTER — INPATIENT (INPATIENT)
Facility: HOSPITAL | Age: 28
LOS: 5 days | Discharge: HOME | DRG: 253 | End: 2023-09-11
Attending: THORACIC SURGERY (CARDIOTHORACIC VASCULAR SURGERY) | Admitting: THORACIC SURGERY (CARDIOTHORACIC VASCULAR SURGERY)
Payer: COMMERCIAL

## 2023-09-05 VITALS
RESPIRATION RATE: 18 BRPM | OXYGEN SATURATION: 100 % | TEMPERATURE: 98 F | SYSTOLIC BLOOD PRESSURE: 126 MMHG | WEIGHT: 160.5 LBS | HEIGHT: 72 IN | HEART RATE: 79 BPM | DIASTOLIC BLOOD PRESSURE: 68 MMHG

## 2023-09-05 DIAGNOSIS — I82.B12 ACUTE EMBOLISM AND THROMBOSIS OF LEFT SUBCLAVIAN VEIN: ICD-10-CM

## 2023-09-05 DIAGNOSIS — Z79.01 LONG TERM (CURRENT) USE OF ANTICOAGULANTS: ICD-10-CM

## 2023-09-05 DIAGNOSIS — I82.A12 ACUTE EMBOLISM AND THROMBOSIS OF LEFT AXILLARY VEIN: ICD-10-CM

## 2023-09-05 DIAGNOSIS — I87.1 COMPRESSION OF VEIN: ICD-10-CM

## 2023-09-05 LAB
ALBUMIN SERPL ELPH-MCNC: 4.4 G/DL — SIGNIFICANT CHANGE UP (ref 3.3–5)
ALP SERPL-CCNC: 85 U/L — SIGNIFICANT CHANGE UP (ref 40–120)
ALT FLD-CCNC: 37 U/L — SIGNIFICANT CHANGE UP (ref 12–78)
ANION GAP SERPL CALC-SCNC: 4 MMOL/L — LOW (ref 5–17)
APPEARANCE UR: CLEAR — SIGNIFICANT CHANGE UP
APTT BLD: 149.2 SEC — CRITICAL HIGH (ref 24.5–35.6)
APTT BLD: 34.5 SEC — SIGNIFICANT CHANGE UP (ref 24.5–35.6)
AST SERPL-CCNC: 27 U/L — SIGNIFICANT CHANGE UP (ref 15–37)
BILIRUB DIRECT SERPL-MCNC: 0.1 MG/DL — SIGNIFICANT CHANGE UP (ref 0–0.3)
BILIRUB INDIRECT FLD-MCNC: 0.2 MG/DL — SIGNIFICANT CHANGE UP (ref 0.2–1)
BILIRUB SERPL-MCNC: 0.3 MG/DL — SIGNIFICANT CHANGE UP (ref 0.2–1.2)
BILIRUB UR-MCNC: NEGATIVE — SIGNIFICANT CHANGE UP
BLD GP AB SCN SERPL QL: SIGNIFICANT CHANGE UP
BUN SERPL-MCNC: 21 MG/DL — SIGNIFICANT CHANGE UP (ref 7–23)
CALCIUM SERPL-MCNC: 9.6 MG/DL — SIGNIFICANT CHANGE UP (ref 8.5–10.1)
CHLORIDE SERPL-SCNC: 108 MMOL/L — SIGNIFICANT CHANGE UP (ref 96–108)
CO2 SERPL-SCNC: 28 MMOL/L — SIGNIFICANT CHANGE UP (ref 22–31)
COLOR SPEC: YELLOW — SIGNIFICANT CHANGE UP
CREAT SERPL-MCNC: 1.18 MG/DL — SIGNIFICANT CHANGE UP (ref 0.5–1.3)
DIFF PNL FLD: NEGATIVE — SIGNIFICANT CHANGE UP
EGFR: 88 ML/MIN/1.73M2 — SIGNIFICANT CHANGE UP
GLUCOSE SERPL-MCNC: 94 MG/DL — SIGNIFICANT CHANGE UP (ref 70–99)
GLUCOSE UR QL: NEGATIVE — SIGNIFICANT CHANGE UP
HCT VFR BLD CALC: 37.6 % — LOW (ref 39–50)
HCT VFR BLD CALC: 39.6 % — SIGNIFICANT CHANGE UP (ref 39–50)
HGB BLD-MCNC: 13.1 G/DL — SIGNIFICANT CHANGE UP (ref 13–17)
HGB BLD-MCNC: 13.4 G/DL — SIGNIFICANT CHANGE UP (ref 13–17)
INR BLD: 1.21 RATIO — HIGH (ref 0.85–1.18)
KETONES UR-MCNC: NEGATIVE — SIGNIFICANT CHANGE UP
LEUKOCYTE ESTERASE UR-ACNC: NEGATIVE — SIGNIFICANT CHANGE UP
MCHC RBC-ENTMCNC: 29.2 PG — SIGNIFICANT CHANGE UP (ref 27–34)
MCHC RBC-ENTMCNC: 29.6 PG — SIGNIFICANT CHANGE UP (ref 27–34)
MCHC RBC-ENTMCNC: 33.8 GM/DL — SIGNIFICANT CHANGE UP (ref 32–36)
MCHC RBC-ENTMCNC: 34.8 GM/DL — SIGNIFICANT CHANGE UP (ref 32–36)
MCV RBC AUTO: 84.9 FL — SIGNIFICANT CHANGE UP (ref 80–100)
MCV RBC AUTO: 86.3 FL — SIGNIFICANT CHANGE UP (ref 80–100)
NITRITE UR-MCNC: NEGATIVE — SIGNIFICANT CHANGE UP
PH UR: 6 — SIGNIFICANT CHANGE UP (ref 5–8)
PLATELET # BLD AUTO: 301 K/UL — SIGNIFICANT CHANGE UP (ref 150–400)
PLATELET # BLD AUTO: 311 K/UL — SIGNIFICANT CHANGE UP (ref 150–400)
POTASSIUM SERPL-MCNC: 4.3 MMOL/L — SIGNIFICANT CHANGE UP (ref 3.5–5.3)
POTASSIUM SERPL-SCNC: 4.3 MMOL/L — SIGNIFICANT CHANGE UP (ref 3.5–5.3)
PROT SERPL-MCNC: 8.5 GM/DL — HIGH (ref 6–8.3)
PROT UR-MCNC: NEGATIVE — SIGNIFICANT CHANGE UP
PROTHROM AB SERPL-ACNC: 13.6 SEC — HIGH (ref 9.5–13)
RBC # BLD: 4.43 M/UL — SIGNIFICANT CHANGE UP (ref 4.2–5.8)
RBC # BLD: 4.59 M/UL — SIGNIFICANT CHANGE UP (ref 4.2–5.8)
RBC # FLD: 11.8 % — SIGNIFICANT CHANGE UP (ref 10.3–14.5)
RBC # FLD: 11.9 % — SIGNIFICANT CHANGE UP (ref 10.3–14.5)
SODIUM SERPL-SCNC: 140 MMOL/L — SIGNIFICANT CHANGE UP (ref 135–145)
SP GR SPEC: 1.01 — SIGNIFICANT CHANGE UP (ref 1.01–1.02)
UROBILINOGEN FLD QL: NEGATIVE — SIGNIFICANT CHANGE UP
WBC # BLD: 6.76 K/UL — SIGNIFICANT CHANGE UP (ref 3.8–10.5)
WBC # BLD: 7.64 K/UL — SIGNIFICANT CHANGE UP (ref 3.8–10.5)
WBC # FLD AUTO: 6.76 K/UL — SIGNIFICANT CHANGE UP (ref 3.8–10.5)
WBC # FLD AUTO: 7.64 K/UL — SIGNIFICANT CHANGE UP (ref 3.8–10.5)

## 2023-09-05 PROCEDURE — 88305 TISSUE EXAM BY PATHOLOGIST: CPT

## 2023-09-05 PROCEDURE — 93010 ELECTROCARDIOGRAM REPORT: CPT

## 2023-09-05 PROCEDURE — 99222 1ST HOSP IP/OBS MODERATE 55: CPT

## 2023-09-05 PROCEDURE — 97116 GAIT TRAINING THERAPY: CPT | Mod: GP

## 2023-09-05 PROCEDURE — 71045 X-RAY EXAM CHEST 1 VIEW: CPT

## 2023-09-05 PROCEDURE — 88300 SURGICAL PATH GROSS: CPT

## 2023-09-05 PROCEDURE — 84100 ASSAY OF PHOSPHORUS: CPT

## 2023-09-05 PROCEDURE — 86901 BLOOD TYPING SEROLOGIC RH(D): CPT

## 2023-09-05 PROCEDURE — 86923 COMPATIBILITY TEST ELECTRIC: CPT

## 2023-09-05 PROCEDURE — 86850 RBC ANTIBODY SCREEN: CPT

## 2023-09-05 PROCEDURE — 83735 ASSAY OF MAGNESIUM: CPT

## 2023-09-05 PROCEDURE — 93005 ELECTROCARDIOGRAM TRACING: CPT

## 2023-09-05 PROCEDURE — 86900 BLOOD TYPING SEROLOGIC ABO: CPT

## 2023-09-05 PROCEDURE — 81003 URINALYSIS AUTO W/O SCOPE: CPT

## 2023-09-05 PROCEDURE — 85730 THROMBOPLASTIN TIME PARTIAL: CPT

## 2023-09-05 PROCEDURE — C9290: CPT

## 2023-09-05 PROCEDURE — 85610 PROTHROMBIN TIME: CPT

## 2023-09-05 PROCEDURE — 85027 COMPLETE CBC AUTOMATED: CPT

## 2023-09-05 PROCEDURE — C1889: CPT

## 2023-09-05 PROCEDURE — 97162 PT EVAL MOD COMPLEX 30 MIN: CPT | Mod: GP

## 2023-09-05 PROCEDURE — 36415 COLL VENOUS BLD VENIPUNCTURE: CPT

## 2023-09-05 PROCEDURE — 80076 HEPATIC FUNCTION PANEL: CPT

## 2023-09-05 PROCEDURE — 80048 BASIC METABOLIC PNL TOTAL CA: CPT

## 2023-09-05 RX ORDER — HEPARIN SODIUM 5000 [USP'U]/ML
6000 INJECTION INTRAVENOUS; SUBCUTANEOUS EVERY 6 HOURS
Refills: 0 | Status: DISCONTINUED | OUTPATIENT
Start: 2023-09-05 | End: 2023-09-07

## 2023-09-05 RX ORDER — HEPARIN SODIUM 5000 [USP'U]/ML
INJECTION INTRAVENOUS; SUBCUTANEOUS
Qty: 25000 | Refills: 0 | Status: DISCONTINUED | OUTPATIENT
Start: 2023-09-05 | End: 2023-09-07

## 2023-09-05 RX ORDER — INFLUENZA VIRUS VACCINE 15; 15; 15; 15 UG/.5ML; UG/.5ML; UG/.5ML; UG/.5ML
0.5 SUSPENSION INTRAMUSCULAR ONCE
Refills: 0 | Status: DISCONTINUED | OUTPATIENT
Start: 2023-09-05 | End: 2023-09-11

## 2023-09-05 RX ORDER — HEPARIN SODIUM 5000 [USP'U]/ML
6000 INJECTION INTRAVENOUS; SUBCUTANEOUS ONCE
Refills: 0 | Status: COMPLETED | OUTPATIENT
Start: 2023-09-05 | End: 2023-09-05

## 2023-09-05 RX ORDER — CHLORHEXIDINE GLUCONATE 213 G/1000ML
1 SOLUTION TOPICAL ONCE
Refills: 0 | Status: COMPLETED | OUTPATIENT
Start: 2023-09-05 | End: 2023-09-05

## 2023-09-05 RX ORDER — HEPARIN SODIUM 5000 [USP'U]/ML
3000 INJECTION INTRAVENOUS; SUBCUTANEOUS EVERY 6 HOURS
Refills: 0 | Status: DISCONTINUED | OUTPATIENT
Start: 2023-09-05 | End: 2023-09-07

## 2023-09-05 RX ADMIN — HEPARIN SODIUM 1100 UNIT(S)/HR: 5000 INJECTION INTRAVENOUS; SUBCUTANEOUS at 22:23

## 2023-09-05 RX ADMIN — CHLORHEXIDINE GLUCONATE 1 APPLICATION(S): 213 SOLUTION TOPICAL at 12:51

## 2023-09-05 RX ADMIN — HEPARIN SODIUM 1300 UNIT(S)/HR: 5000 INJECTION INTRAVENOUS; SUBCUTANEOUS at 13:45

## 2023-09-05 RX ADMIN — HEPARIN SODIUM 1300 UNIT(S)/HR: 5000 INJECTION INTRAVENOUS; SUBCUTANEOUS at 19:46

## 2023-09-05 RX ADMIN — HEPARIN SODIUM 0 UNIT(S)/HR: 5000 INJECTION INTRAVENOUS; SUBCUTANEOUS at 21:21

## 2023-09-05 RX ADMIN — HEPARIN SODIUM 6000 UNIT(S): 5000 INJECTION INTRAVENOUS; SUBCUTANEOUS at 13:46

## 2023-09-05 NOTE — PATIENT PROFILE ADULT - FALL HARM RISK - UNIVERSAL INTERVENTIONS
Bed in lowest position, wheels locked, appropriate side rails in place/Call bell, personal items and telephone in reach/Instruct patient to call for assistance before getting out of bed or chair/Non-slip footwear when patient is out of bed/[place] to call system/Physically safe environment - no spills, clutter or unnecessary equipment/Purposeful Proactive Rounding/Room/bathroom lighting operational, light cord in reach

## 2023-09-05 NOTE — H&P ADULT - NSHPPHYSICALEXAM_GEN_ALL_CORE
General: WN/WD NAD  Neurology: Awake, nonfocal, COLEMAN x 4  Eyes: Scleras clear, EOMI, Gross vision intact  ENT: Gross hearing intact, grossly patent pharynx, no stridor  Neck: Neck supple, trachea midline, No JVD  Respiratory: CTA B/L, No wheezing, rales, rhonchi  CV: RRR, S1S2, no murmurs, rubs or gallops  Abdominal: Soft, NT, ND  Extremities: No edema, + peripheral pulses  Skin: No Rashes, Hematoma, Ecchymosis  Lymphatic: No Neck, axilla, groin LAD  Psych: Oriented x 3, normal affect

## 2023-09-05 NOTE — H&P ADULT - NSHPREVIEWOFSYSTEMS_GEN_ALL_CORE
REVIEW OF SYSTEMS  General: No Weight change/ Fatigue/ HA/Dizzy	  Skin/Breast: No Rashes/ Lesions/ Masses  Ophthalmologic: No Blurry vision/ Glaucoma/ Blindness  ENMT: No Hearing loss/ Drainage/ Lesions	  Respiratory and Thorax: No Cough/ Wheezing/ SOB/ Hemoptysis/ Sputum production  Cardiovascular: No Chest pain/ Palpitations/ Diaphoresis	  Gastrointestinal: No Nausea/ Vomiting/ Constipation/ Appetite Change	  Genitourinary: No gross Hematuria/ Dysuria/ Frequency change	  Musculoskeletal: No Pain/ Weakness/ Claudication	  Neurological: + R middle and ring finger numbness, No Seizures/ TIA/CVA  Psychiatric: No Dementia/ Depression/ SI/HI	  Hematology/Lymphatics: No hx of bleeding/ Edema	  Endocrine: No Hyperglycemia/ Hypoglycemia  Allergic/Immunologic: No Anaphylaxis/ Intolerance/ Recent illnesses

## 2023-09-05 NOTE — H&P ADULT - HISTORY OF PRESENT ILLNESS
26yo M with no prior PMH recently previously admitted on 8/6 for LUE Axillary vein and subclavian vein DVT with Thoracic outlet syndrome presumed secondary to strenuous weight lifting. Patient underwent  LUE thrombectomy on 8/9 in IR. Post op course complicated by recurrence of DVT in the axillary subclavian and basilic veins in the immediate postop period requiring angiojet thrombectomy of LUE 8/10. Patient was placed on heparin gtt and transitioned to Eliquis for discharge. Note previous Hosp course complicated by contrast induced CORNELIO.     Patient now returns for pre-surgical admission for anticoagulation. Last dose Eliquis 9/4 10PM. Left first rib resection planned for Thursday 9/7 AM. Now complaints upon arrival, other than his persistent R middle and ring finger numbness that began last admission after extended IV placement.

## 2023-09-05 NOTE — H&P ADULT - ASSESSMENT
28yo M with no PMH admitted for LUE Thoracic outlet syndrome.     PLAN  Hep gtt  Admit to Dr. Harris  OR Thursday for 1st rib resection  D/W Dr. Kimberly Gomez PA  Thoracic #[id number]

## 2023-09-06 LAB
ANION GAP SERPL CALC-SCNC: 5 MMOL/L — SIGNIFICANT CHANGE UP (ref 5–17)
APTT BLD: 183.7 SEC — CRITICAL HIGH (ref 24.5–35.6)
APTT BLD: 49.5 SEC — HIGH (ref 24.5–35.6)
APTT BLD: 88 SEC — HIGH (ref 24.5–35.6)
BUN SERPL-MCNC: 21 MG/DL — SIGNIFICANT CHANGE UP (ref 7–23)
CALCIUM SERPL-MCNC: 9.4 MG/DL — SIGNIFICANT CHANGE UP (ref 8.5–10.1)
CHLORIDE SERPL-SCNC: 109 MMOL/L — HIGH (ref 96–108)
CO2 SERPL-SCNC: 27 MMOL/L — SIGNIFICANT CHANGE UP (ref 22–31)
CREAT SERPL-MCNC: 1.17 MG/DL — SIGNIFICANT CHANGE UP (ref 0.5–1.3)
EGFR: 89 ML/MIN/1.73M2 — SIGNIFICANT CHANGE UP
GLUCOSE SERPL-MCNC: 98 MG/DL — SIGNIFICANT CHANGE UP (ref 70–99)
HCT VFR BLD CALC: 38 % — LOW (ref 39–50)
HGB BLD-MCNC: 13.2 G/DL — SIGNIFICANT CHANGE UP (ref 13–17)
MCHC RBC-ENTMCNC: 29.1 PG — SIGNIFICANT CHANGE UP (ref 27–34)
MCHC RBC-ENTMCNC: 34.7 GM/DL — SIGNIFICANT CHANGE UP (ref 32–36)
MCV RBC AUTO: 83.9 FL — SIGNIFICANT CHANGE UP (ref 80–100)
PLATELET # BLD AUTO: 268 K/UL — SIGNIFICANT CHANGE UP (ref 150–400)
POTASSIUM SERPL-MCNC: 3.8 MMOL/L — SIGNIFICANT CHANGE UP (ref 3.5–5.3)
POTASSIUM SERPL-SCNC: 3.8 MMOL/L — SIGNIFICANT CHANGE UP (ref 3.5–5.3)
RBC # BLD: 4.53 M/UL — SIGNIFICANT CHANGE UP (ref 4.2–5.8)
RBC # FLD: 11.8 % — SIGNIFICANT CHANGE UP (ref 10.3–14.5)
SODIUM SERPL-SCNC: 141 MMOL/L — SIGNIFICANT CHANGE UP (ref 135–145)
WBC # BLD: 7.31 K/UL — SIGNIFICANT CHANGE UP (ref 3.8–10.5)
WBC # FLD AUTO: 7.31 K/UL — SIGNIFICANT CHANGE UP (ref 3.8–10.5)

## 2023-09-06 PROCEDURE — 99231 SBSQ HOSP IP/OBS SF/LOW 25: CPT

## 2023-09-06 RX ORDER — SODIUM CHLORIDE 9 MG/ML
1000 INJECTION INTRAMUSCULAR; INTRAVENOUS; SUBCUTANEOUS
Refills: 0 | Status: DISCONTINUED | OUTPATIENT
Start: 2023-09-06 | End: 2023-09-08

## 2023-09-06 RX ORDER — CEFAZOLIN SODIUM 1 G
2000 VIAL (EA) INJECTION ONCE
Refills: 0 | Status: COMPLETED | OUTPATIENT
Start: 2023-09-07 | End: 2023-09-07

## 2023-09-06 RX ADMIN — HEPARIN SODIUM 0 UNIT(S)/HR: 5000 INJECTION INTRAVENOUS; SUBCUTANEOUS at 04:53

## 2023-09-06 RX ADMIN — HEPARIN SODIUM 900 UNIT(S)/HR: 5000 INJECTION INTRAVENOUS; SUBCUTANEOUS at 15:41

## 2023-09-06 RX ADMIN — HEPARIN SODIUM 3000 UNIT(S): 5000 INJECTION INTRAVENOUS; SUBCUTANEOUS at 20:32

## 2023-09-06 RX ADMIN — HEPARIN SODIUM 900 UNIT(S)/HR: 5000 INJECTION INTRAVENOUS; SUBCUTANEOUS at 07:28

## 2023-09-06 RX ADMIN — HEPARIN SODIUM 900 UNIT(S)/HR: 5000 INJECTION INTRAVENOUS; SUBCUTANEOUS at 20:13

## 2023-09-06 RX ADMIN — HEPARIN SODIUM 1100 UNIT(S)/HR: 5000 INJECTION INTRAVENOUS; SUBCUTANEOUS at 20:23

## 2023-09-06 RX ADMIN — HEPARIN SODIUM 900 UNIT(S)/HR: 5000 INJECTION INTRAVENOUS; SUBCUTANEOUS at 12:16

## 2023-09-06 RX ADMIN — HEPARIN SODIUM 900 UNIT(S)/HR: 5000 INJECTION INTRAVENOUS; SUBCUTANEOUS at 19:30

## 2023-09-06 RX ADMIN — HEPARIN SODIUM 900 UNIT(S)/HR: 5000 INJECTION INTRAVENOUS; SUBCUTANEOUS at 05:59

## 2023-09-06 NOTE — PROGRESS NOTE ADULT - ASSESSMENT
26yo M with no PMH admitted for LUE Thoracic outlet syndrome.  -Continue heparin gtt - supratherapeutic aPTT may be related to blood draws from same side as infusion.  Will follow nomogram.    -Plan for left first rib resection in AM  -Hold heparin 6 hours before OR procedure  -Discussed with Dr. Harris on AM rounds.

## 2023-09-06 NOTE — CONSULT NOTE ADULT - ASSESSMENT
27M with recurrent LUE DVT due to thoracic outlet syndrome  Planned to undergo Lt 1st rib resection on Thursday    Plan:  - Pain control PRN  - Monitor vitals  - on Hep gtt for preop, goal PTT 60-90  - Regular, NPO after midnight   - Nausea control PRN  - replete electrolytes  - daily labs  - OOB/PT  - Rest of the care as per primary team    Plan will be discussed with Vascular surgery attending, Dr. Kim

## 2023-09-06 NOTE — PROVIDER CONTACT NOTE (OTHER) - ACTION/TREATMENT ORDERED:
PA aware that patient no longer has access and needs a Hep gtt. ICU, SICU, Education called and all unable to come.
MD informed. increase rate by two for a rate of 11gtts and give PRN blous. reassess next PTT.

## 2023-09-06 NOTE — CONSULT NOTE ADULT - SUBJECTIVE AND OBJECTIVE BOX
28yo M with no prior PMH recently previously admitted on 8/6 for LUE Axillary vein and subclavian vein DVT with Thoracic outlet syndrome presumed secondary to strenuous weight lifting. Patient underwent  LUE thrombectomy on 8/9 in IR. Post op course complicated by recurrence of DVT in the axillary subclavian and basilic veins in the immediate postop period requiring angiojet thrombectomy of LUE 8/10. Patient was placed on heparin gtt and transitioned to Eliquis for discharge. Note previous Hosp course complicated by contrast induced CORNELIO. Patient now returns for pre-surgical admission for anticoagulation. Last dose Eliquis 9/4 10PM. Left first rib resection planned for Thursday 9/7 AM.     Patient is well known to our service. Pt underwent INARI venous thrombectomy from Lt subclavian and axillary vein with balloon angioplasty on 8/9/23, then returned back to the OR on 8/10/23 for another thrombectomy from Lt subclavian and basilic vein. Patient is well aware of his condition and amenable with first rib resection of the left side. No other complaints        PMHx: Lt thoracic outlet syndrome with recurrent DVT  PSHx: INARI venous thrombectomy from Lt subclavian & axillary vein with balloon angioplasty on 8/9/23,  thrombectomy from Lt subclavian and basilic vein on 8/10/23  ALL: NKDA        General: WN/WD NAD  Neurology: Awake, nonfocal, COLEMAN x 4  Eyes: Scleras clear, EOMI, Gross vision intact  ENT: Gross hearing intact, grossly patent pharynx, no stridor  Neck: Neck supple, trachea midline, No JVD  Respiratory: CTA B/L, No wheezing, rales, rhonchi  CV: RRR, S1S2, no murmurs, rubs or gallops  Abdominal: Soft, NT, ND  Extremities: No edema, + peripheral pulses  Skin: No Rashes, Hematoma, Ecchymosis  Lymphatic: No Neck, axilla, groin LAD  Psych: Oriented x 3, normal affect              Vitals:  T(C): 36.8 (09-06 @ 00:15), Max: 36.8 (09-06 @ 00:15)  HR: 71 (09-06 @ 00:15) (70 - 79)  BP: 111/70 (09-06 @ 00:15) (110/66 - 126/68)  RR: 17 (09-06 @ 00:15) (17 - 18)  SpO2: 99% (09-06 @ 00:15) (99% - 100%)        09-06 @ 04:07                    13.2  CBC: 7.31>)-------(<268                     38.0                 141 | 109 | 21    CMP:  ----------------------< 98               3.8 | 27 | 1.17                      Ca:9.4  Phos:-  Mg:-               -|      |-        LFTs:  ------|-|-----             -|      |-  09-05 @ 19:38                    13.1  CBC: 7.64>)-------(<301                     37.6                 - | - | -    CMP:  ----------------------< -               - | - | -                      Ca:-  Phos:-  Mg:-               -|      |-        LFTs:  ------|-|-----             -|      |-  09-05 @ 12:20                    13.4  CBC: 6.76>)-------(<311                     39.6                 140 | 108 | 21    CMP:  ----------------------< 94               4.3 | 28 | 1.18                      Ca:9.6  Phos:-  Mg:-               0.3|      |27        LFTs:  ------|85|-----             0.1|      |-            Current Inpatient Medications:  heparin   Injectable 6000 Unit(s) IV Push every 6 hours PRN  heparin   Injectable 3000 Unit(s) IV Push every 6 hours PRN  heparin  Infusion.  Unit(s)/Hr (13 mL/Hr) IV Continuous <Continuous>  influenza   Vaccine 0.5 milliLiter(s) IntraMuscular once

## 2023-09-07 ENCOUNTER — RESULT REVIEW (OUTPATIENT)
Age: 28
End: 2023-09-07

## 2023-09-07 ENCOUNTER — TRANSCRIPTION ENCOUNTER (OUTPATIENT)
Age: 28
End: 2023-09-07

## 2023-09-07 ENCOUNTER — APPOINTMENT (OUTPATIENT)
Dept: THORACIC SURGERY | Facility: HOSPITAL | Age: 28
End: 2023-09-07

## 2023-09-07 LAB
ANION GAP SERPL CALC-SCNC: 7 MMOL/L — SIGNIFICANT CHANGE UP (ref 5–17)
APTT BLD: 153.5 SEC — CRITICAL HIGH (ref 24.5–35.6)
APTT BLD: 41.9 SEC — HIGH (ref 24.5–35.6)
BUN SERPL-MCNC: 28 MG/DL — HIGH (ref 7–23)
CALCIUM SERPL-MCNC: 9.2 MG/DL — SIGNIFICANT CHANGE UP (ref 8.5–10.1)
CHLORIDE SERPL-SCNC: 108 MMOL/L — SIGNIFICANT CHANGE UP (ref 96–108)
CO2 SERPL-SCNC: 27 MMOL/L — SIGNIFICANT CHANGE UP (ref 22–31)
CREAT SERPL-MCNC: 1.15 MG/DL — SIGNIFICANT CHANGE UP (ref 0.5–1.3)
EGFR: 91 ML/MIN/1.73M2 — SIGNIFICANT CHANGE UP
GLUCOSE SERPL-MCNC: 100 MG/DL — HIGH (ref 70–99)
HCT VFR BLD CALC: 37.4 % — LOW (ref 39–50)
HGB BLD-MCNC: 13.1 G/DL — SIGNIFICANT CHANGE UP (ref 13–17)
INR BLD: 1.15 RATIO — SIGNIFICANT CHANGE UP (ref 0.85–1.18)
INR BLD: 1.16 RATIO — SIGNIFICANT CHANGE UP (ref 0.85–1.18)
MAGNESIUM SERPL-MCNC: 2.3 MG/DL — SIGNIFICANT CHANGE UP (ref 1.6–2.6)
MCHC RBC-ENTMCNC: 29.2 PG — SIGNIFICANT CHANGE UP (ref 27–34)
MCHC RBC-ENTMCNC: 35 GM/DL — SIGNIFICANT CHANGE UP (ref 32–36)
MCV RBC AUTO: 83.5 FL — SIGNIFICANT CHANGE UP (ref 80–100)
PHOSPHATE SERPL-MCNC: 3.7 MG/DL — SIGNIFICANT CHANGE UP (ref 2.5–4.5)
PLATELET # BLD AUTO: 262 K/UL — SIGNIFICANT CHANGE UP (ref 150–400)
POTASSIUM SERPL-MCNC: 4 MMOL/L — SIGNIFICANT CHANGE UP (ref 3.5–5.3)
POTASSIUM SERPL-SCNC: 4 MMOL/L — SIGNIFICANT CHANGE UP (ref 3.5–5.3)
PROTHROM AB SERPL-ACNC: 12.9 SEC — SIGNIFICANT CHANGE UP (ref 9.5–13)
PROTHROM AB SERPL-ACNC: 13 SEC — SIGNIFICANT CHANGE UP (ref 9.5–13)
RBC # BLD: 4.48 M/UL — SIGNIFICANT CHANGE UP (ref 4.2–5.8)
RBC # FLD: 11.9 % — SIGNIFICANT CHANGE UP (ref 10.3–14.5)
SODIUM SERPL-SCNC: 142 MMOL/L — SIGNIFICANT CHANGE UP (ref 135–145)
WBC # BLD: 7.97 K/UL — SIGNIFICANT CHANGE UP (ref 3.8–10.5)
WBC # FLD AUTO: 7.97 K/UL — SIGNIFICANT CHANGE UP (ref 3.8–10.5)

## 2023-09-07 PROCEDURE — 88305 TISSUE EXAM BY PATHOLOGIST: CPT | Mod: 26

## 2023-09-07 PROCEDURE — 71045 X-RAY EXAM CHEST 1 VIEW: CPT | Mod: 26

## 2023-09-07 PROCEDURE — 99231 SBSQ HOSP IP/OBS SF/LOW 25: CPT

## 2023-09-07 PROCEDURE — 21615 EXCISION 1ST &/CERVICAL RIB: CPT | Mod: 62,LT

## 2023-09-07 PROCEDURE — 88300 SURGICAL PATH GROSS: CPT | Mod: 26,59

## 2023-09-07 PROCEDURE — 21705 REVISION OF NECK MUSCLE/RIB: CPT

## 2023-09-07 RX ORDER — HYDROMORPHONE HYDROCHLORIDE 2 MG/ML
0.5 INJECTION INTRAMUSCULAR; INTRAVENOUS; SUBCUTANEOUS
Refills: 0 | Status: DISCONTINUED | OUTPATIENT
Start: 2023-09-07 | End: 2023-09-08

## 2023-09-07 RX ORDER — ONDANSETRON 8 MG/1
4 TABLET, FILM COATED ORAL ONCE
Refills: 0 | Status: DISCONTINUED | OUTPATIENT
Start: 2023-09-07 | End: 2023-09-07

## 2023-09-07 RX ORDER — NALOXONE HYDROCHLORIDE 4 MG/.1ML
0.1 SPRAY NASAL
Refills: 0 | Status: DISCONTINUED | OUTPATIENT
Start: 2023-09-07 | End: 2023-09-11

## 2023-09-07 RX ORDER — HYDROMORPHONE HYDROCHLORIDE 2 MG/ML
30 INJECTION INTRAMUSCULAR; INTRAVENOUS; SUBCUTANEOUS
Refills: 0 | Status: DISCONTINUED | OUTPATIENT
Start: 2023-09-07 | End: 2023-09-08

## 2023-09-07 RX ORDER — ASPIRIN/CALCIUM CARB/MAGNESIUM 324 MG
81 TABLET ORAL DAILY
Refills: 0 | Status: DISCONTINUED | OUTPATIENT
Start: 2023-09-07 | End: 2023-09-11

## 2023-09-07 RX ORDER — ASPIRIN/CALCIUM CARB/MAGNESIUM 324 MG
81 TABLET ORAL ONCE
Refills: 0 | Status: COMPLETED | OUTPATIENT
Start: 2023-09-07 | End: 2023-09-07

## 2023-09-07 RX ORDER — SODIUM CHLORIDE 9 MG/ML
1000 INJECTION, SOLUTION INTRAVENOUS
Refills: 0 | Status: DISCONTINUED | OUTPATIENT
Start: 2023-09-07 | End: 2023-09-07

## 2023-09-07 RX ORDER — ONDANSETRON 8 MG/1
4 TABLET, FILM COATED ORAL EVERY 6 HOURS
Refills: 0 | Status: DISCONTINUED | OUTPATIENT
Start: 2023-09-07 | End: 2023-09-11

## 2023-09-07 RX ORDER — HYDROMORPHONE HYDROCHLORIDE 2 MG/ML
0.5 INJECTION INTRAMUSCULAR; INTRAVENOUS; SUBCUTANEOUS
Refills: 0 | Status: DISCONTINUED | OUTPATIENT
Start: 2023-09-07 | End: 2023-09-07

## 2023-09-07 RX ORDER — ACETAMINOPHEN 500 MG
1000 TABLET ORAL ONCE
Refills: 0 | Status: DISCONTINUED | OUTPATIENT
Start: 2023-09-07 | End: 2023-09-11

## 2023-09-07 RX ADMIN — SODIUM CHLORIDE 100 MILLILITER(S): 9 INJECTION INTRAMUSCULAR; INTRAVENOUS; SUBCUTANEOUS at 22:21

## 2023-09-07 RX ADMIN — Medication 81 MILLIGRAM(S): at 12:53

## 2023-09-07 RX ADMIN — HEPARIN SODIUM 1100 UNIT(S)/HR: 5000 INJECTION INTRAVENOUS; SUBCUTANEOUS at 02:18

## 2023-09-07 RX ADMIN — SODIUM CHLORIDE 100 MILLILITER(S): 9 INJECTION INTRAMUSCULAR; INTRAVENOUS; SUBCUTANEOUS at 02:05

## 2023-09-07 RX ADMIN — HYDROMORPHONE HYDROCHLORIDE 30 MILLILITER(S): 2 INJECTION INTRAMUSCULAR; INTRAVENOUS; SUBCUTANEOUS at 12:19

## 2023-09-07 NOTE — PROVIDER CONTACT NOTE (CRITICAL VALUE NOTIFICATION) - ACTION/TREATMENT ORDERED:
MD aware. Based on heparin nomogram stop infusion for 1hr and decrease rate by 2ml/hr
MD made aware. Based on heparin Nomogram stop infusion for 1hr and decrease rate by 2ml/hr
MD informed. No interventions at this time.

## 2023-09-07 NOTE — BRIEF OPERATIVE NOTE - NSICDXBRIEFPROCEDURE_GEN_ALL_CORE_FT
PROCEDURES:  First rib resection 07-Sep-2023 11:42:47  Rachel Grady  Surgical removal of rib or muscle release for decompression of thoracic outlet 07-Sep-2023 11:42:57  Rachel Grady

## 2023-09-07 NOTE — BRIEF OPERATIVE NOTE - OPERATION/FINDINGS
supra and infraclavicular approach  vein visualized and noted to be decompressed  post op CXR confirmed no pneumothorax Anesthesia Volume In Cc (Will Not Render If 0): 0.5

## 2023-09-07 NOTE — PROGRESS NOTE ADULT - ASSESSMENT
amb with nurse, ramsey well    26yo M with no PMH admitted for LUE Thoracic outlet syndrome now s/p Left Rib resection     Plan   Pain mgmt   ambulate   IS  Hold AC  cont ASA as ordered   CXR in am   Labs in am   neuro checks Q2 hours   DW Dr Harris

## 2023-09-07 NOTE — PROGRESS NOTE ADULT - ASSESSMENT
27M with recurrent LUE DVT due to thoracic outlet syndrome  Planned to undergo Lt 1st rib resection on Thursday    Plan:  - Lt 1st rib resection today  - Pain control PRN  - Monitor vitals  - on Hep gtt for preop, goal PTT 60-90      - HOLD heparin drip 6 hours before surgery  - Regular, NPO after midnight   - Nausea control PRN  - replete electrolytes  - daily labs  - OOB/PT  - Rest of the care as per primary team    Plan will be discussed with Vascular surgery attending, Dr. Kim       27M with recurrent LUE DVT due to thoracic outlet syndrome  Planned to undergo Lt 1st rib resection on Thursday    Plan:  - Lt 1st rib resection today  - Pain control PRN  - Monitor vitals  - on Hep gtt for preop, goal PTT 60-90      - Heparin gtt held since 2AM  - Regular, NPO after midnight   - Nausea control PRN  - replete electrolytes  - daily labs  - OOB/PT  - Rest of the care as per primary team    Plan will be discussed with Vascular surgery attending, Dr. Kim

## 2023-09-08 LAB
ANION GAP SERPL CALC-SCNC: 4 MMOL/L — LOW (ref 5–17)
BUN SERPL-MCNC: 20 MG/DL — SIGNIFICANT CHANGE UP (ref 7–23)
CALCIUM SERPL-MCNC: 8.9 MG/DL — SIGNIFICANT CHANGE UP (ref 8.5–10.1)
CHLORIDE SERPL-SCNC: 110 MMOL/L — HIGH (ref 96–108)
CO2 SERPL-SCNC: 25 MMOL/L — SIGNIFICANT CHANGE UP (ref 22–31)
CREAT SERPL-MCNC: 1.08 MG/DL — SIGNIFICANT CHANGE UP (ref 0.5–1.3)
EGFR: 98 ML/MIN/1.73M2 — SIGNIFICANT CHANGE UP
GLUCOSE SERPL-MCNC: 124 MG/DL — HIGH (ref 70–99)
HCT VFR BLD CALC: 33.7 % — LOW (ref 39–50)
HGB BLD-MCNC: 11.6 G/DL — LOW (ref 13–17)
MCHC RBC-ENTMCNC: 29 PG — SIGNIFICANT CHANGE UP (ref 27–34)
MCHC RBC-ENTMCNC: 34.4 GM/DL — SIGNIFICANT CHANGE UP (ref 32–36)
MCV RBC AUTO: 84.3 FL — SIGNIFICANT CHANGE UP (ref 80–100)
PLATELET # BLD AUTO: 266 K/UL — SIGNIFICANT CHANGE UP (ref 150–400)
POTASSIUM SERPL-MCNC: 4.2 MMOL/L — SIGNIFICANT CHANGE UP (ref 3.5–5.3)
POTASSIUM SERPL-SCNC: 4.2 MMOL/L — SIGNIFICANT CHANGE UP (ref 3.5–5.3)
RBC # BLD: 4 M/UL — LOW (ref 4.2–5.8)
RBC # FLD: 11.7 % — SIGNIFICANT CHANGE UP (ref 10.3–14.5)
SODIUM SERPL-SCNC: 139 MMOL/L — SIGNIFICANT CHANGE UP (ref 135–145)
WBC # BLD: 14.76 K/UL — HIGH (ref 3.8–10.5)
WBC # FLD AUTO: 14.76 K/UL — HIGH (ref 3.8–10.5)

## 2023-09-08 PROCEDURE — 99231 SBSQ HOSP IP/OBS SF/LOW 25: CPT

## 2023-09-08 PROCEDURE — 71045 X-RAY EXAM CHEST 1 VIEW: CPT | Mod: 26

## 2023-09-08 RX ORDER — OXYCODONE HYDROCHLORIDE 5 MG/1
10 TABLET ORAL EVERY 4 HOURS
Refills: 0 | Status: DISCONTINUED | OUTPATIENT
Start: 2023-09-08 | End: 2023-09-11

## 2023-09-08 RX ORDER — OXYCODONE HYDROCHLORIDE 5 MG/1
5 TABLET ORAL EVERY 4 HOURS
Refills: 0 | Status: DISCONTINUED | OUTPATIENT
Start: 2023-09-08 | End: 2023-09-11

## 2023-09-08 RX ORDER — ACETAMINOPHEN 500 MG
1000 TABLET ORAL ONCE
Refills: 0 | Status: COMPLETED | OUTPATIENT
Start: 2023-09-08 | End: 2023-09-08

## 2023-09-08 RX ADMIN — Medication 400 MILLIGRAM(S): at 17:33

## 2023-09-08 RX ADMIN — OXYCODONE HYDROCHLORIDE 10 MILLIGRAM(S): 5 TABLET ORAL at 21:40

## 2023-09-08 RX ADMIN — OXYCODONE HYDROCHLORIDE 5 MILLIGRAM(S): 5 TABLET ORAL at 12:17

## 2023-09-08 RX ADMIN — OXYCODONE HYDROCHLORIDE 10 MILLIGRAM(S): 5 TABLET ORAL at 16:01

## 2023-09-08 RX ADMIN — OXYCODONE HYDROCHLORIDE 10 MILLIGRAM(S): 5 TABLET ORAL at 21:13

## 2023-09-08 RX ADMIN — OXYCODONE HYDROCHLORIDE 10 MILLIGRAM(S): 5 TABLET ORAL at 15:08

## 2023-09-08 RX ADMIN — Medication 1000 MILLIGRAM(S): at 18:07

## 2023-09-08 RX ADMIN — Medication 81 MILLIGRAM(S): at 09:59

## 2023-09-08 RX ADMIN — OXYCODONE HYDROCHLORIDE 5 MILLIGRAM(S): 5 TABLET ORAL at 13:03

## 2023-09-08 NOTE — PROGRESS NOTE ADULT - ASSESSMENT
28yo M with no PMH admitted for LUE Thoracic outlet syndrome now s/p Left Rib resection     Plan   Pain mgmt - PCA d/c ,oral meds started   ambulate   IS  Hold AC until after Venogram  Plan for Venogram Monday   cont ASA as ordered   CXR in am   Labs in am   neuro checks Q2 hours   DW Dr Harris   26yo M with no PMH admitted for LUE Thoracic outlet syndrome now s/p Left Rib resection     Plan   Pain mgmt - PCA d/c ,oral meds started   ambulate   IS  Hold AC until after Venogram  Plan for Venogram Monday   cont ASA as ordered   CXR in am   Labs in am   neuro checks Q2 hours   maintain TANVIR drain over weekend   Pt to remain in SICU until after venogram   LAZARO Harris

## 2023-09-08 NOTE — PROGRESS NOTE ADULT - ASSESSMENT
28yo M with no PMH admitted for LUE Thoracic outlet syndrome now s/p Left Rib resection     Plan   Pain mgmt - PCA d/c ,oral meds started   ambulate   IS  Hold AC until after Venogram  Plan for Venogram Monday   cont ASA as ordered   CXR in am   Labs in am   neuro checks Q2 hours   maintain TANVIR drain over weekend   Pt to remain in SICU until after venogram     LAZARO Kim

## 2023-09-08 NOTE — PROVIDER CONTACT NOTE (OTHER) - SITUATION
Office is aware that patient is in [facility].  Please fax discharge papers to [phone].
PTT is 49.5 results from 18:10

## 2023-09-09 LAB
ANION GAP SERPL CALC-SCNC: 5 MMOL/L — SIGNIFICANT CHANGE UP (ref 5–17)
BUN SERPL-MCNC: 16 MG/DL — SIGNIFICANT CHANGE UP (ref 7–23)
CALCIUM SERPL-MCNC: 9.1 MG/DL — SIGNIFICANT CHANGE UP (ref 8.5–10.1)
CHLORIDE SERPL-SCNC: 107 MMOL/L — SIGNIFICANT CHANGE UP (ref 96–108)
CO2 SERPL-SCNC: 26 MMOL/L — SIGNIFICANT CHANGE UP (ref 22–31)
CREAT SERPL-MCNC: 1.05 MG/DL — SIGNIFICANT CHANGE UP (ref 0.5–1.3)
EGFR: 101 ML/MIN/1.73M2 — SIGNIFICANT CHANGE UP
GLUCOSE SERPL-MCNC: 100 MG/DL — HIGH (ref 70–99)
HCT VFR BLD CALC: 36.6 % — LOW (ref 39–50)
HGB BLD-MCNC: 12.5 G/DL — LOW (ref 13–17)
MAGNESIUM SERPL-MCNC: 1.9 MG/DL — SIGNIFICANT CHANGE UP (ref 1.6–2.6)
MCHC RBC-ENTMCNC: 29.3 PG — SIGNIFICANT CHANGE UP (ref 27–34)
MCHC RBC-ENTMCNC: 34.2 GM/DL — SIGNIFICANT CHANGE UP (ref 32–36)
MCV RBC AUTO: 85.9 FL — SIGNIFICANT CHANGE UP (ref 80–100)
PHOSPHATE SERPL-MCNC: 2.6 MG/DL — SIGNIFICANT CHANGE UP (ref 2.5–4.5)
PLATELET # BLD AUTO: 196 K/UL — SIGNIFICANT CHANGE UP (ref 150–400)
POTASSIUM SERPL-MCNC: 3.7 MMOL/L — SIGNIFICANT CHANGE UP (ref 3.5–5.3)
POTASSIUM SERPL-SCNC: 3.7 MMOL/L — SIGNIFICANT CHANGE UP (ref 3.5–5.3)
RBC # BLD: 4.26 M/UL — SIGNIFICANT CHANGE UP (ref 4.2–5.8)
RBC # FLD: 12 % — SIGNIFICANT CHANGE UP (ref 10.3–14.5)
SODIUM SERPL-SCNC: 138 MMOL/L — SIGNIFICANT CHANGE UP (ref 135–145)
WBC # BLD: 12.85 K/UL — HIGH (ref 3.8–10.5)
WBC # FLD AUTO: 12.85 K/UL — HIGH (ref 3.8–10.5)

## 2023-09-09 PROCEDURE — 71045 X-RAY EXAM CHEST 1 VIEW: CPT | Mod: 26

## 2023-09-09 RX ORDER — ACETAMINOPHEN 500 MG
1000 TABLET ORAL ONCE
Refills: 0 | Status: COMPLETED | OUTPATIENT
Start: 2023-09-09 | End: 2023-09-09

## 2023-09-09 RX ADMIN — Medication 400 MILLIGRAM(S): at 08:40

## 2023-09-09 RX ADMIN — Medication 400 MILLIGRAM(S): at 23:03

## 2023-09-09 RX ADMIN — Medication 81 MILLIGRAM(S): at 10:54

## 2023-09-09 RX ADMIN — OXYCODONE HYDROCHLORIDE 10 MILLIGRAM(S): 5 TABLET ORAL at 02:45

## 2023-09-09 RX ADMIN — OXYCODONE HYDROCHLORIDE 10 MILLIGRAM(S): 5 TABLET ORAL at 13:48

## 2023-09-09 RX ADMIN — Medication 1000 MILLIGRAM(S): at 23:27

## 2023-09-09 RX ADMIN — OXYCODONE HYDROCHLORIDE 10 MILLIGRAM(S): 5 TABLET ORAL at 17:46

## 2023-09-09 RX ADMIN — Medication 1000 MILLIGRAM(S): at 08:55

## 2023-09-09 RX ADMIN — OXYCODONE HYDROCHLORIDE 10 MILLIGRAM(S): 5 TABLET ORAL at 13:18

## 2023-09-09 RX ADMIN — OXYCODONE HYDROCHLORIDE 10 MILLIGRAM(S): 5 TABLET ORAL at 02:24

## 2023-09-09 RX ADMIN — OXYCODONE HYDROCHLORIDE 10 MILLIGRAM(S): 5 TABLET ORAL at 18:15

## 2023-09-09 NOTE — PROGRESS NOTE ADULT - ASSESSMENT
28yo M with no PMH admitted for LUE Thoracic outlet syndrome now s/p Left Rib resection     Plan   Pain mgmt - PCA d/c ,oral meds started   ambulate   IS  s/p Hep gtt 9/5-9/8. on ASA 81mg  Hold AC until after Venogram  Plan for Venogram Monday   CXR in am   Labs in am   neuro checks Q2 hours   maintain TANVIR drain over weekend   Pt to remain in SICU until after venogram       Plan will be discussed with Vascular surgery attending, Dr. Kim

## 2023-09-09 NOTE — CDI QUERY NOTE - NSCDIOTHERTXTBX_GEN_ALL_CORE_HH
Clinical documentation indicates that this patient has CORNELIO.     Please clarify if there is a further diagnosis with CORNELIO.    - CORNELIO with ATN due to contrast nephropathy  - CORNELIO with no ATN  - Other ( Please specify)        CLINICAL INDICATIONS:    Creatinine: 2.18 mg/dL (08.20.23 @ 07:15)   Creatinine: 2.27 mg/dL (08.19.23 @ 07:08)   Creatinine: 2.44 mg/dL (08.18.23 @ 06:41)   Creatinine: 2.74 mg/dL (08.17.23 @ 06:14)   Creatinine: 3.24 mg/dL (08.16.23 @ 06:02)   Creatinine: 3.31 mg/dL (08.15.23 @ 07:48)   Creatinine: 3.32 mg/dL (08.14.23 @ 05:45)   Creatinine: 3.46 mg/dL (08.13.23 @ 19:41)   Creatinine: 3.46 mg/dL (08.13.23 @ 06:37)   Creatinine: 3.29 mg/dL (08.12.23 @ 16:58)   Creatinine: 3.45 mg/dL (08.12.23 @ 06:24)   Creatinine: 3.32 mg/dL (08.11.23 @ 23:42)   Creatinine: 2.66 mg/dL (08.11.23 @ 07:21)   Creatinine: 1.75 mg/dL (08.10.23 @ 18:16)   Creatinine: 0.97 mg/dL (08.10.23 @ 07:23)   Creatinine: 0.89 mg/dL (08.09.23 @ 04:00)   Creatinine: 0.99 mg/dL (08.08.23 @ 03:36)   Creatinine: 0.97 mg/dL (08.07.23 @ 06:04)   Creatinine: 1.15 mg/dL (08.05.23 @ 23:39)         Nephrology documentation on 8/11/2023:  CORNELIO  - mutlifactorial    Heme pigment CORNELIO  +/- contrast nephropathy       Critical care documentation on 8-  Post op course b/l recurrence of LUE clot. and worsening CORNELIO  Nephro: CORNELIO likely contrast induced and poor PO intake. Cr improving today 3.2 -> 2.7 cont 1/2    Thoracic surgery documentation on 8-  Hosp course complicated by contrast induced CORNELIO.

## 2023-09-09 NOTE — PROGRESS NOTE ADULT - ASSESSMENT
Assessment/plan  26 y/o M with no pmhx remains in hospital for treatment of   1. Thoracic outlet 2/p L rib resection    - Pain management with oxycodone and around clock tylenol  - IS  - Ambulate as able  - Holding AC until venogram on Monday Vascular recs appreciated  - q 2 hour n/v checks   - CXR no PTX appreciated, repeat in AM  - C/w ASA  - TANVIR drain minimal output. Will keep in with possible removal tomm    Case discussed with thoracic surgeon Dr Oneal and plan agreed to  Assessment/plan  26 y/o M with no pmhx remains in hospital for treatment of   1. Thoracic outlet 2/p L rib resection    - Pain management with oxycodone and around clock tylenol  - IS  - Ambulate as able  - Holding AC until venogram on Monday Vascular recs appreciated  - q 2 hour n/v checks   - CXR no PTX appreciated, repeat in AM  - C/w ASA  - TANVIR drain minimal output. Will keep in with possible removal tomm    Case discussed with thoracic surgeon Dr Oneal and plan agreed to      Time spent on this patient encounter, which includes documenting this note in the electronic medical record, was 45 minutes including assessing the presenting problems with associated risks, reviewing the medical record to prepare for the encounter, and meeting face to face with patient to obtain additional history. I have also performed an appropriate physical exam, made interventions listed and ordered and interpreted appropriate diagnostic studies as documented. To improve communication and patient safety, I have coordinated care with the multidisciplinary team including the bedside nurse, appropriate attending of record and consultants as needed.

## 2023-09-10 LAB
HCT VFR BLD CALC: 33.8 % — LOW (ref 39–50)
HGB BLD-MCNC: 11.7 G/DL — LOW (ref 13–17)
MCHC RBC-ENTMCNC: 29.3 PG — SIGNIFICANT CHANGE UP (ref 27–34)
MCHC RBC-ENTMCNC: 34.6 GM/DL — SIGNIFICANT CHANGE UP (ref 32–36)
MCV RBC AUTO: 84.5 FL — SIGNIFICANT CHANGE UP (ref 80–100)
PLATELET # BLD AUTO: 197 K/UL — SIGNIFICANT CHANGE UP (ref 150–400)
RBC # BLD: 4 M/UL — LOW (ref 4.2–5.8)
RBC # FLD: 11.6 % — SIGNIFICANT CHANGE UP (ref 10.3–14.5)
WBC # BLD: 10.94 K/UL — HIGH (ref 3.8–10.5)
WBC # FLD AUTO: 10.94 K/UL — HIGH (ref 3.8–10.5)

## 2023-09-10 PROCEDURE — 71045 X-RAY EXAM CHEST 1 VIEW: CPT | Mod: 26,77

## 2023-09-10 PROCEDURE — 71045 X-RAY EXAM CHEST 1 VIEW: CPT | Mod: 26

## 2023-09-10 RX ORDER — ACETAMINOPHEN 500 MG
1000 TABLET ORAL ONCE
Refills: 0 | Status: COMPLETED | OUTPATIENT
Start: 2023-09-10 | End: 2023-09-10

## 2023-09-10 RX ADMIN — OXYCODONE HYDROCHLORIDE 10 MILLIGRAM(S): 5 TABLET ORAL at 08:34

## 2023-09-10 RX ADMIN — Medication 400 MILLIGRAM(S): at 21:37

## 2023-09-10 RX ADMIN — Medication 81 MILLIGRAM(S): at 10:07

## 2023-09-10 NOTE — PROGRESS NOTE ADULT - ASSESSMENT
28yo M with no PMH admitted for LUE Thoracic outlet syndrome now s/p Left 1st rib resection, POD#3    Plan   Pain control prn  OOB ambulation   IS  PT eval: no weight bearing on LUE  Cont  ASA 81mg  Hold AC until after Venogram  Plan for Venogram Monday   neuro checks Q2 hours   maintain TANVIR drain over weekend   Pt to remain in SICU until after venogram       Plan will be discussed with Vascular surgery attending, Dr. Kim

## 2023-09-10 NOTE — PROGRESS NOTE ADULT - ASSESSMENT
A:    27M  HD # 6  FULL CODE    Here for:    1. LUE TOS s/p rib resection  2. LUE DVT  3. CORNELIO, improved      P:    Pt feeling better  Ambulating  Tolerating diet  Swelling LUE improving    Cont analgesia, pain well controlled  Plan to d/c TANVIR tomorrow and restart AC  CORNELIO improved  PT consult  Ambulate as much as possible    Dispo: Cont care.

## 2023-09-10 NOTE — PHYSICAL THERAPY INITIAL EVALUATION ADULT - PERTINENT HX OF CURRENT PROBLEM, REHAB EVAL
Pt admitted to  s/p L 1st rib resection, secondary to L Thoracic outlet syndrome. 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 Pt is agreeable to PT. Has been ambulating in hallway independently multiple times a day.

## 2023-09-10 NOTE — PHYSICAL THERAPY INITIAL EVALUATION ADULT - GENERAL OBSERVATIONS, REHAB EVAL
Pt sitting in bedside chair in SICU in [facility], all monitors attached. RN Dior aware. Mother at bedside.

## 2023-09-11 ENCOUNTER — TRANSCRIPTION ENCOUNTER (OUTPATIENT)
Age: 28
End: 2023-09-11

## 2023-09-11 VITALS
DIASTOLIC BLOOD PRESSURE: 87 MMHG | SYSTOLIC BLOOD PRESSURE: 100 MMHG | RESPIRATION RATE: 13 BRPM | HEART RATE: 87 BPM | OXYGEN SATURATION: 99 %

## 2023-09-11 LAB
ANION GAP SERPL CALC-SCNC: 3 MMOL/L — LOW (ref 5–17)
BUN SERPL-MCNC: 18 MG/DL — SIGNIFICANT CHANGE UP (ref 7–23)
CALCIUM SERPL-MCNC: 9.2 MG/DL — SIGNIFICANT CHANGE UP (ref 8.5–10.1)
CHLORIDE SERPL-SCNC: 106 MMOL/L — SIGNIFICANT CHANGE UP (ref 96–108)
CO2 SERPL-SCNC: 29 MMOL/L — SIGNIFICANT CHANGE UP (ref 22–31)
CREAT SERPL-MCNC: 0.88 MG/DL — SIGNIFICANT CHANGE UP (ref 0.5–1.3)
EGFR: 122 ML/MIN/1.73M2 — SIGNIFICANT CHANGE UP
GLUCOSE SERPL-MCNC: 99 MG/DL — SIGNIFICANT CHANGE UP (ref 70–99)
HCT VFR BLD CALC: 32.5 % — LOW (ref 39–50)
HGB BLD-MCNC: 11.6 G/DL — LOW (ref 13–17)
MAGNESIUM SERPL-MCNC: 2.2 MG/DL — SIGNIFICANT CHANGE UP (ref 1.6–2.6)
MCHC RBC-ENTMCNC: 29.7 PG — SIGNIFICANT CHANGE UP (ref 27–34)
MCHC RBC-ENTMCNC: 35.7 GM/DL — SIGNIFICANT CHANGE UP (ref 32–36)
MCV RBC AUTO: 83.3 FL — SIGNIFICANT CHANGE UP (ref 80–100)
PHOSPHATE SERPL-MCNC: 3.4 MG/DL — SIGNIFICANT CHANGE UP (ref 2.5–4.5)
PLATELET # BLD AUTO: 234 K/UL — SIGNIFICANT CHANGE UP (ref 150–400)
POTASSIUM SERPL-MCNC: 3.8 MMOL/L — SIGNIFICANT CHANGE UP (ref 3.5–5.3)
POTASSIUM SERPL-SCNC: 3.8 MMOL/L — SIGNIFICANT CHANGE UP (ref 3.5–5.3)
RBC # BLD: 3.9 M/UL — LOW (ref 4.2–5.8)
RBC # FLD: 11.8 % — SIGNIFICANT CHANGE UP (ref 10.3–14.5)
SODIUM SERPL-SCNC: 138 MMOL/L — SIGNIFICANT CHANGE UP (ref 135–145)
WBC # BLD: 7.3 K/UL — SIGNIFICANT CHANGE UP (ref 3.8–10.5)
WBC # FLD AUTO: 7.3 K/UL — SIGNIFICANT CHANGE UP (ref 3.8–10.5)

## 2023-09-11 PROCEDURE — 71045 X-RAY EXAM CHEST 1 VIEW: CPT | Mod: 26,76

## 2023-09-11 PROCEDURE — 99238 HOSP IP/OBS DSCHRG MGMT 30/<: CPT

## 2023-09-11 RX ORDER — APIXABAN 2.5 MG/1
1 TABLET, FILM COATED ORAL
Qty: 0 | Refills: 0 | DISCHARGE
Start: 2023-09-11

## 2023-09-11 RX ORDER — APIXABAN 2.5 MG/1
5 TABLET, FILM COATED ORAL EVERY 12 HOURS
Refills: 0 | Status: DISCONTINUED | OUTPATIENT
Start: 2023-09-11 | End: 2023-09-11

## 2023-09-11 RX ORDER — OXYCODONE HYDROCHLORIDE 5 MG/1
1 TABLET ORAL
Qty: 28 | Refills: 0
Start: 2023-09-11

## 2023-09-11 RX ADMIN — OXYCODONE HYDROCHLORIDE 10 MILLIGRAM(S): 5 TABLET ORAL at 11:33

## 2023-09-11 RX ADMIN — OXYCODONE HYDROCHLORIDE 10 MILLIGRAM(S): 5 TABLET ORAL at 12:20

## 2023-09-11 RX ADMIN — Medication 1000 MILLIGRAM(S): at 00:00

## 2023-09-11 NOTE — DISCHARGE NOTE NURSING/CASE MANAGEMENT/SOCIAL WORK - PATIENT PORTAL LINK FT
You can access the FollowMyHealth Patient Portal offered by [facility] by registering at the following website: [email or web address]. By joining [employer]’s FollowMyHealth portal, you will also be able to view your health information using other applications (apps) compatible with our system.

## 2023-09-11 NOTE — DISCHARGE NOTE PROVIDER - CARE PROVIDER_API CALL
Leif Harris Eliazar  Thoracic Surgery  [id number] [place] [address]  Phone: [phone]  Fax: [phone]  Established Patient  Scheduled Appointment: 09/20/2023 02:15 AM   Naren Kim  Vascular Surgery  [address] [facility], [address]  [place] [address]  Phone: [phone]  Fax: [phone]  Follow Up Time:     Leif Harris  Phone: [phone]  Fax: (   )    -  Scheduled Appointment: 09/20/2023 02:15 AM   Naren Kim  Vascular Surgery  [address] [facility], [address]  [place] [address]  Phone: [phone]  Fax: [phone]  Follow Up Time: 1-3 days    Leif Harris  Phone: [phone]  Fax: (   )    -  Scheduled Appointment: 09/20/2023 02:15 AM

## 2023-09-11 NOTE — PROGRESS NOTE ADULT - REASON FOR ADMISSION
Pre-Surgical anticoagulation

## 2023-09-11 NOTE — PROGRESS NOTE ADULT - SUBJECTIVE AND OBJECTIVE BOX
Subjective: Pt in bed NAD.  No issues overnight     T(C): 36.6 (09-08-23 @ 06:00), Max: 36.6 (09-07-23 @ 11:48)  HR: 94 (09-08-23 @ 08:00) (70 - 103)  BP: 118/55 (09-08-23 @ 08:00) (95/51 - 137/90)  ABP: --  ABP(mean): --  RR: 18 (09-08-23 @ 08:00) (0 - 21)  SpO2: 99% (09-08-23 @ 08:00) (94% - 100%) RA   Wt(kg): --  CVP(mm Hg): --  CO: --  CI: --  PA: --                                              Tele: SR     TANVIR drain 130cc                       OUTPUT:     per 24 hours    AIR LEAKS:  [ ] YES [ x] NO          09-08    139  |  110<H>  |  20  ----------------------------<  124<H>  4.2   |  25  |  1.08    Ca    8.9      08 Sep 2023 06:04  Phos  3.7     09-07  Mg     2.3     09-07                                 11.6   14.76 )-----------( 266      ( 08 Sep 2023 06:03 )             33.7        PT/INR - ( 07 Sep 2023 03:58 )   PT: 13.0 sec;   INR: 1.16 ratio         PTT - ( 07 Sep 2023 03:58 )  PTT:41.9 sec         CAPILLARY BLOOD GLUCOSE               CXR:  < from: Xray Chest 1 View- PORTABLE-Routine (Xray Chest 1 View- PORTABLE-Routine in AM.) (09.08.23 @ 07:00) >  Frontal expiratory view of the chest shows the heart to be normal in   size. Endotracheal tube reaches the level of the medial clavicles. Left   chest tube projects over the upper left lung with air projected over the   left lung apex. There has been resection of a portion of the first rib.    The lungs are clear and there is no evidence of definite pneumothorax nor   pleural effusion.    Chest one view 9/8/2023 6:33 AM  Compared to the prior study, less air projects over the upper left chest.    IMPRESSION:  Less left chest air.        Thank you for the courtesy of this referral.    < end of copied text >          exam   Neuro:  Alert Awake NAD   Pulm: decreased at bases b/l   CV: RRR   Abd:  Soft   Extremities:  warm   Inc : Lt SCL c/d/i  + TANVIR drain + tenderness         Assessment:  27yMale    with PAST MEDICAL & SURGICAL HISTORY:  No pertinent past medical history      No significant past surgical history            Plan:  
27y Male pre-op 1st rib resection    Subjective: No complaints at this time.  Heparin infusion running.      T(C): 36.6 (09-06-23 @ 07:46), Max: 36.8 (09-06-23 @ 00:15)  HR: 77 (09-06-23 @ 07:46) (70 - 79)  BP: 111/66 (09-06-23 @ 07:46) (110/66 - 126/68)  ABP: --  ABP(mean): --  RR: 17 (09-06-23 @ 07:46) (17 - 18)  SpO2: 100% (09-06-23 @ 07:46) (99% - 100%)  Wt(kg): --  CVP(mm Hg): --  CO: --  CI: --  PA: --         09-06    141  |  109<H>  |  21  ----------------------------<  98  3.8   |  27  |  1.17    Ca    9.4      06 Sep 2023 04:07    TPro  8.5<H>  /  Alb  4.4  /  TBili  0.3  /  DBili  0.1  /  AST  27  /  ALT  37  /  AlkPhos  85  09-05                               13.2   7.31  )-----------( 268      ( 06 Sep 2023 04:07 )             38.0        PT/INR - ( 05 Sep 2023 12:20 )   PT: 13.6 sec;   INR: 1.21 ratio         PTT - ( 06 Sep 2023 04:07 )  PTT:183.7 sec             CAPILLARY BLOOD GLUCOSE               CXR:    I&O's Detail      MEDICATIONS  (STANDING):  heparin  Infusion.  Unit(s)/Hr (13 mL/Hr) IV Continuous <Continuous>  influenza   Vaccine 0.5 milliLiter(s) IntraMuscular once    MEDICATIONS  (PRN):  heparin   Injectable 6000 Unit(s) IV Push every 6 hours PRN For aPTT less than 40  heparin   Injectable 3000 Unit(s) IV Push every 6 hours PRN For aPTT between 40 - 57      Physical Exam  Neuro: A+O x 3, non-focal, speech clear and intact  Pulm: CTA, equal bilaterally  CV: RRR, +S1S2  Abd: soft, NT, ND, +BS  Ext: Mild swelling of LUE, states improved from previous.      -----------------------------------------------------------------------------------------------------------------------------------------------------------------------------  -----------------------------------------------------------------------------------------------------------------------------------------------------------------------------
Pt seen and examined at bedside, no acute events. Pt hads no complaints. Tolerating liquid diet. Continues to have bowel function. Denied fever, chills, nausea, vomiting or SOB overnight.         PE:  Neuro:  Alert Awake NAD   Pulm: decreased at bases b/l   CV: RRR   Abd:  Soft   Extremities:  warm   Incisions: Lt SCL c/d/i  + serosanguinous TANVIR drain + tenderness to palpation        
CC:  Patient is a 27y old  Male who presents with a chief complaint of Pre-Surgical anticoagulation (08 Sep 2023 12:30)      HPI/BRIEF HOSPITAL COURSE:   28 y/o  M with no pmhx now POD 2 s/p L rib resection for thoracic outlet syndrome.    Events last 24 hours:   No acute events over night.    PAST MEDICAL & SURGICAL HISTORY:  No pertinent past medical history      No significant past surgical history        Allergies    No Known Allergies    Intolerances      FAMILY HISTORY:  No pertinent family history in first degree relatives        Review of Systems:  CONSTITUTIONAL: No fever, chills, or fatigue  EYES: No eye pain, visual disturbances, or discharge  ENMT:  No difficulty hearing, tinnitus, vertigo; No sinus or throat pain  NECK: No pain or stiffness  RESPIRATORY: No cough, wheezing, chills or hemoptysis; No shortness of breath  CARDIOVASCULAR: No chest pain, palpitations, dizziness, or leg swelling  GASTROINTESTINAL: No abdominal or epigastric pain. No nausea, vomiting, or hematemesis; No diarrhea or constipation. No melena or hematochezia.  GENITOURINARY: No dysuria, frequency, hematuria, or incontinence  NEUROLOGICAL: No headaches, memory loss, loss of strength, numbness, or tremors  SKIN: No itching, burning, rashes, or lesions   MUSCULOSKELETAL: No joint pain or swelling; No muscle, back, or extremity pain  PSYCHIATRIC: No depression, anxiety, mood swings, or difficulty sleeping      Medications:        acetaminophen   IVPB .. 1000 milliGRAM(s) IV Intermittent once PRN  ondansetron Injectable 4 milliGRAM(s) IV Push every 6 hours PRN  oxyCODONE    IR 5 milliGRAM(s) Oral every 4 hours PRN  oxyCODONE    IR 10 milliGRAM(s) Oral every 4 hours PRN      aspirin  chewable 81 milliGRAM(s) Oral daily            influenza   Vaccine 0.5 milliLiter(s) IntraMuscular once      naloxone Injectable 0.1 milliGRAM(s) IV Push every 3 minutes PRN          ICU Vital Signs Last 24 Hrs  T(C): 36.6 (09 Sep 2023 06:00), Max: 36.9 (08 Sep 2023 12:30)  T(F): 97.9 (09 Sep 2023 06:00), Max: 98.4 (08 Sep 2023 12:30)  HR: 101 (09 Sep 2023 08:00) (73 - 113)  BP: 103/53 (09 Sep 2023 08:00) (103/53 - 126/81)  BP(mean): 67 (09 Sep 2023 08:00) (67 - 94)  ABP: --  ABP(mean): --  RR: 0 (09 Sep 2023 08:00) (0 - 20)  SpO2: 98% (09 Sep 2023 08:00) (97% - 100%)    O2 Parameters below as of 08 Sep 2023 18:00  Patient On (Oxygen Delivery Method): room air          Vital Signs Last 24 Hrs  T(C): 36.6 (09 Sep 2023 06:00), Max: 36.9 (08 Sep 2023 12:30)  T(F): 97.9 (09 Sep 2023 06:00), Max: 98.4 (08 Sep 2023 12:30)  HR: 101 (09 Sep 2023 08:00) (73 - 113)  BP: 103/53 (09 Sep 2023 08:00) (103/53 - 126/81)  BP(mean): 67 (09 Sep 2023 08:00) (67 - 94)  RR: 0 (09 Sep 2023 08:00) (0 - 20)  SpO2: 98% (09 Sep 2023 08:00) (97% - 100%)    Parameters below as of 08 Sep 2023 18:00  Patient On (Oxygen Delivery Method): room air            I&O's Detail    08 Sep 2023 07:01  -  09 Sep 2023 07:00  --------------------------------------------------------  IN:  Total IN: 0 mL    OUT:    Bulb (mL): 40 mL    Voided (mL): 600 mL  Total OUT: 640 mL    Total NET: -640 mL            LABS:                        12.5   12.85 )-----------( 196      ( 09 Sep 2023 06:18 )             36.6     09-09    138  |  107  |  16  ----------------------------<  100<H>  3.7   |  26  |  1.05    Ca    9.1      09 Sep 2023 06:18  Phos  2.6     09-09  Mg     1.9     09-09            CAPILLARY BLOOD GLUCOSE          Urinalysis Basic - ( 09 Sep 2023 06:18 )    Color: x / Appearance: x / SG: x / pH: x  Gluc: 100 mg/dL / Ketone: x  / Bili: x / Urobili: x   Blood: x / Protein: x / Nitrite: x   Leuk Esterase: x / RBC: x / WBC x   Sq Epi: x / Non Sq Epi: x / Bacteria: x      CULTURES:        Physical Examination:  General: No acute distress.  Alert, oriented, interactive, nonfocal  NEURO: A&O X3, motor function 5/5 BL UE/LE  HEENT: Pupils equal, reactive to light.  Symmetric. Genaro drain in place  PULM: CTA BL, no significant sputum production, no wheezes, rales, rhonchi  CVS: Regular rate and rhythm, no murmurs, rubs, or gallops  ABD: Soft, nondistended, nontender, normoactive bowel sounds, no masses  EXT: No edema, nontender  SKIN: Warm and well perfused, no rashes noted      RADIOLOGY:   < from: Xray Chest 1 View- PORTABLE-Routine (Xray Chest 1 View- PORTABLE-Routine in AM.) (09.08.23 @ 07:00) >  Frontal expiratory view of the chest shows the heart to be normal in   size. Endotracheal tube reaches the level of the medial clavicles. Left   chest tube projects over the upper left lung with air projected over the   left lung apex. There has been resection of a portion of the first rib.    The lungs are clear and there is no evidence of definite pneumothorax nor   pleural effusion.    Chest one view 9/8/2023 6:33 AM  Compared to the prior study, less air projects over the upper left chest.    IMPRESSION:  Less left chest air.        Thank you for the courtesy of this referral.    --- End of Report ---    < end of copied text >                  
CTS Progress Note    HPI:    S:    Pt seen and examined  HD # 6  FULL CODE  PMHx none  presenting with LUE Axillary vein and subclavian vein DVT with Thoracic outlet syndrome. s/p LUE thrombectomy on 8/9 in IR. Post op course complicated by recurrence of DVT in the axillary subclavian and basilic veins in the immediate postop period. S/p angiojet thrombectomy of LUE 8/10.   Return to hospital for elective 1st rib removal 9/5, s/p    9/10 AM: TANVIR remains, output less. Pain improved, OOB, tolerating diet, PT on board.     ROS: + LUE swelling, improved  Remainder of systems reviewed, negative    Allergies    No Known Allergies    Intolerances    MEDICATIONS  (STANDING):    aspirin  chewable 81 milliGRAM(s) Oral daily  influenza   Vaccine 0.5 milliLiter(s) IntraMuscular once    MEDICATIONS  (PRN):    acetaminophen   IVPB .. 1000 milliGRAM(s) IV Intermittent once PRN Mild Pain (1 - 3)  naloxone Injectable 0.1 milliGRAM(s) IV Push every 3 minutes PRN For ANY of the following changes in patient status:  A. RR LESS THAN 10 breaths per minute, B. Oxygen saturation LESS THAN 90%, C. Sedation score of 6  ondansetron Injectable 4 milliGRAM(s) IV Push every 6 hours PRN Nausea  oxyCODONE    IR 5 milliGRAM(s) Oral every 4 hours PRN Moderate Pain (4 - 6)  oxyCODONE    IR 10 milliGRAM(s) Oral every 4 hours PRN Severe Pain (7 - 10)    Drug Dosing Weight    Height (cm): 182.9 (05 Sep 2023 10:50)  Weight (kg): 72.8 (05 Sep 2023 10:50)  BMI (kg/m2): 21.8 (05 Sep 2023 10:50)  BSA (m2): 1.94 (05 Sep 2023 10:50)    PAST MEDICAL & SURGICAL HISTORY:    No pertinent past medical history    No significant past surgical history    FAMILY HISTORY:    No pertinent family history in first degree relatives    ROS: See HPI; otherwise, all systems reviewed and negative.    O:    ICU Vital Signs Last 24 Hrs  T(C): 37.1 (10 Sep 2023 09:04), Max: 37.4 (09 Sep 2023 22:43)  T(F): 98.8 (10 Sep 2023 09:04), Max: 99.3 (09 Sep 2023 22:43)  HR: 91 (10 Sep 2023 06:00) (84 - 122)  BP: 106/67 (10 Sep 2023 04:00) (103/58 - 127/77)  BP(mean): 78 (10 Sep 2023 04:00) (69 - 91)  ABP: --  ABP(mean): --  RR: 0 (10 Sep 2023 06:00) (0 - 29)  SpO2: 97% (10 Sep 2023 06:00) (96% - 100%)    I&O's Detail    09 Sep 2023 07:01  -  10 Sep 2023 07:00  --------------------------------------------------------  IN:  Total IN: 0 mL    OUT:    Bulb (mL): 30 mL  Total OUT: 30 mL    Total NET: -30 mL    PE:    Adult M lying in bed  Cranium atraumatic   No JVD trachea midline  + L sided neck scars noted, no swelling/erythema, C/D/I  TANVIR in place, scant discharge  + LUE reduced swelling  Edema improved throughout  Awake and alert  Skin pink and perfused    LABS:    CBC Full  -  ( 10 Sep 2023 05:37 )  WBC Count : 10.94 K/uL  RBC Count : 4.00 M/uL  Hemoglobin : 11.7 g/dL  Hematocrit : 33.8 %  Platelet Count - Automated : 197 K/uL  Mean Cell Volume : 84.5 fl  Mean Cell Hemoglobin : 29.3 pg  Mean Cell Hemoglobin Concentration : 34.6 gm/dL  Auto Neutrophil # : x  Auto Lymphocyte # : x  Auto Monocyte # : x  Auto Eosinophil # : x  Auto Basophil # : x  Auto Neutrophil % : x  Auto Lymphocyte % : x  Auto Monocyte % : x  Auto Eosinophil % : x  Auto Basophil % : x    09-09    138  |  107  |  16  ----------------------------<  100<H>  3.7   |  26  |  1.05    Ca    9.1      09 Sep 2023 06:18  Phos  2.6     09-09  Mg     1.9     09-09    Urinalysis Basic - ( 09 Sep 2023 06:18 )    Color: x / Appearance: x / SG: x / pH: x  Gluc: 100 mg/dL / Ketone: x  / Bili: x / Urobili: x   Blood: x / Protein: x / Nitrite: x   Leuk Esterase: x / RBC: x / WBC x   Sq Epi: x / Non Sq Epi: x / Bacteria: x    CAPILLARY BLOOD GLUCOSE                  
Patient seen and examined on bedside. Patient denies any complaints, no subjective fever or chills overnight. Not endorsing abdominal pain  Nurse denies acute overnight events.  Vitals reviewed      PHYSICAL EXAM:  - GENERAL: Alert and oriented x 3. No acute distress.  - EYES: EOMI. Anicteric.  - HENT: Moist mucous membranes. No scleral icterus. No cervical lymphadenopathy.  - LUNGS: Clear to auscultation bilaterally. No accessory muscle use.  - CARDIOVASCULAR: Regular rate and rhythm. No murmur. No JVD.  - ABDOMEN: Soft, non-tender and non-distended. No palpable masses.  - EXTREMITIES: No edema. Non-tender.  - SKIN: Incisions at Lt  upper chest is c/d/i  + serosanguinous TANVIR drain + appropriate tenderness to palpation Warm.  - NEUROLOGIC: No focal neurological deficits. CN II-XII grossly intact, but not individually tested.  - PSYCHIATRIC: Cooperative. Appropriate mood and affect.    Vital Signs Last 24 Hrs  T(C): 37.1 (10 Sep 2023 09:04), Max: 37.4 (09 Sep 2023 22:43)  T(F): 98.8 (10 Sep 2023 09:04), Max: 99.3 (09 Sep 2023 22:43)  HR: 91 (10 Sep 2023 06:00) (84 - 122)  BP: 106/67 (10 Sep 2023 04:00) (103/58 - 127/77)  BP(mean): 78 (10 Sep 2023 04:00) (69 - 91)  RR: 0 (10 Sep 2023 06:00) (0 - 29)  SpO2: 97% (10 Sep 2023 06:00) (96% - 100%)                          11.7   10.94 )-----------( 197      ( 10 Sep 2023 05:37 )             33.8   09-09    138  |  107  |  16  ----------------------------<  100<H>  3.7   |  26  |  1.05    Ca    9.1      09 Sep 2023 06:18  Phos  2.6     09-09  Mg     1.9     09-09    
Pt seen and examined at bedside, no acute events. Pt had no complaints. Tolerating liquid diet. Continues to have bowel function. Denied fever, chills, nausea, vomiting or SOB overnight.     T(C): 36.6 (09-08-23 @ 06:00), Max: 36.6 (09-07-23 @ 11:48)  HR: 94 (09-08-23 @ 08:00) (70 - 103)  BP: 118/55 (09-08-23 @ 08:00) (95/51 - 137/90)  ABP: --  ABP(mean): --  RR: 18 (09-08-23 @ 08:00) (0 - 21)  SpO2: 99% (09-08-23 @ 08:00) (94% - 100%) RA   Wt(kg): --  CVP(mm Hg): --  CO: --  CI: --  PA: --                                              Tele: SR     TANVIR drain 130cc                       OUTPUT:     per 24 hours    AIR LEAKS:  [ ] YES [ x] NO          09-08    139  |  110<H>  |  20  ----------------------------<  124<H>  4.2   |  25  |  1.08    Ca    8.9      08 Sep 2023 06:04  Phos  3.7     09-07  Mg     2.3     09-07                                 11.6   14.76 )-----------( 266      ( 08 Sep 2023 06:03 )             33.7        PT/INR - ( 07 Sep 2023 03:58 )   PT: 13.0 sec;   INR: 1.16 ratio         PTT - ( 07 Sep 2023 03:58 )  PTT:41.9 sec         CAPILLARY BLOOD GLUCOSE               CXR:  < from: Xray Chest 1 View- PORTABLE-Routine (Xray Chest 1 View- PORTABLE-Routine in AM.) (09.08.23 @ 07:00) >  Frontal expiratory view of the chest shows the heart to be normal in   size. Endotracheal tube reaches the level of the medial clavicles. Left   chest tube projects over the upper left lung with air projected over the   left lung apex. There has been resection of a portion of the first rib.    The lungs are clear and there is no evidence of definite pneumothorax nor   pleural effusion.    Chest one view 9/8/2023 6:33 AM  Compared to the prior study, less air projects over the upper left chest.    IMPRESSION:  Less left chest air.        Thank you for the courtesy of this referral.    < end of copied text >          exam   Neuro:  Alert Awake NAD   Pulm: decreased at bases b/l   CV: RRR   Abd:  Soft   Extremities:  warm   Incisions: Lt SCL c/d/i  + serosanguinous TANVIR drain + tenderness to palpation        
  Subjective:  Pt in bed NAD  s/p Left rib resection for thoracic duct ligation      T(C): 36.4 (09-07-23 @ 14:00), Max: 36.8 (09-07-23 @ 07:00)  HR: 88 (09-07-23 @ 16:00) (70 - 100)  BP: 106/70 (09-07-23 @ 16:00) (106/70 - 137/90)  ABP: --  ABP(mean): --  RR: 18 (09-07-23 @ 16:00) (14 - 20)  SpO2: 98% (09-07-23 @ 16:00) (98% - 100%) RA   Wt(kg): --  CVP(mm Hg): --  CO: --  CI: --  PA: --                                              Tele: SR             09-07    142  |  108  |  28<H>  ----------------------------<  100<H>  4.0   |  27  |  1.15    Ca    9.2      07 Sep 2023 03:58  Phos  3.7     09-07  Mg     2.3     09-07                                 13.1   7.97  )-----------( 262      ( 07 Sep 2023 03:58 )             37.4        PT/INR - ( 07 Sep 2023 03:58 )   PT: 13.0 sec;   INR: 1.16 ratio         PTT - ( 07 Sep 2023 03:58 )  PTT:41.9 sec         CAPILLARY BLOOD GLUCOSE                       Exam   Neuro:  Alert Awake NAD   Pulm:  Decreased at bases   CV: RRR   Abd:  soft   Extremities: warm    Inc : Left SCL c/d/i         Assessment:  27yMale    with PAST MEDICAL & SURGICAL HISTORY:  No pertinent past medical history      No significant past surgical history            Plan:  
Patient seen and examined by surgical team this morning.   No acute overnight events. Patient on heparin drip.  Patient preopped for today's left 1st rib resection  Denies fever or chills. Pain well controlled. No complaints        PE:   General: WN/WD NAD  Neurology: Awake, nonfocal, COLEMAN x 4  Eyes: Scleras clear, EOMI, Gross vision intact  ENT: Gross hearing intact, grossly patent pharynx, no stridor  Neck: Neck supple, trachea midline, No JVD  Respiratory: CTA B/L, No wheezing, rales, rhonchi  CV: RRR, S1S2, no murmurs, rubs or gallops  Abdominal: Soft, NT, ND  Extremities: No edema, + palpable bilateral radial/ulnar pulses  Skin: No Rashes, Hematoma, Ecchymosis  Lymphatic: No Neck, axilla, groin LAD  Psych: Oriented x 3, normal affect                  Vitals:  T(C): 36.8 ( @ 16:00), Max: 36.8 ( @ 16:00)  HR: 96 ( @ :00) (77 - 96)  BP: 120/73 ( @ 16:00) (111/66 - 120/73)  RR: 18 ( @ 16:00) (17 - 18)  SpO2: 100% ( @ 16:00) (100% - 100%)       @ 07:01  -   @ 05:01  --------------------------------------------------------  IN:    Oral Fluid: 240 mL  Total IN: 240 mL    OUT:    Voided (mL): 1 mL  Total OUT: 1 mL    Total NET: 239 mL           @ 03:58                    13.1  CBC: 7.97>)-------(<262                     37.4                 142 | 108 | 28    CMP:  ----------------------< 100               4.0 | 27 | 1.15                      Ca:9.2  Phos:3.7  M.3               -|      |-        LFTs:  ------|-|-----             -|      |-   @ 04:07                    13.2  CBC: 7.31>)-------(<268                     38.0                 141 | 109 | 21    CMP:  ----------------------< 98               3.8 | 27 | 1.17                      Ca:9.4  Phos:-  Mg:-               -|      |-        LFTs:  ------|-|-----             -|      |-   @ 19:38                    13.1  CBC: 7.64>)-------(<301                     37.6                 - | - | -    CMP:  ----------------------< -               - | - | -                      Ca:-  Phos:-  Mg:-               -|      |-        LFTs:  ------|-|-----             -|      |-            Current Inpatient Medications:  ceFAZolin   IVPB 2000 milliGRAM(s) IV Intermittent once  heparin   Injectable 6000 Unit(s) IV Push every 6 hours PRN  heparin   Injectable 3000 Unit(s) IV Push every 6 hours PRN  heparin  Infusion.  Unit(s)/Hr (13 mL/Hr) IV Continuous <Continuous>  influenza   Vaccine 0.5 milliLiter(s) IntraMuscular once  sodium chloride 0.9%. 1000 milliLiter(s) (100 mL/Hr) IV Continuous <Continuous>      
Patient seen and examined on bedside. Patient denies any complaints, no subjective fever or chills overnight. Not endorsing abdominal pain  Nurse denies acute overnight events. Small pneumothorax seen on cxr yesterday but pt is asymptomatic   Vitals reviewed      PHYSICAL EXAM:  - GENERAL: Alert and oriented x 3. No acute distress.  - EYES: EOMI. Anicteric.  - HENT: Moist mucous membranes. No scleral icterus. No cervical lymphadenopathy.  - LUNGS: Clear to auscultation bilaterally. No accessory muscle use.  - CARDIOVASCULAR: Regular rate and rhythm. No murmur. No JVD.  - ABDOMEN: Soft, non-tender and non-distended. No palpable masses.  - EXTREMITIES: No edema. Non-tender.  - SKIN: Incisions at Lt  upper chest is c/d/i  + serosanguinous TANVIR drain + appropriate tenderness to palpation Warm.  - NEUROLOGIC: No focal neurological deficits. CN II-XII grossly intact, but not individually tested.  - PSYCHIATRIC: Cooperative. Appropriate mood and affect.    Vital Signs Last 24 Hrs  T(C): 36.8 (11 Sep 2023 04:00), Max: 37.4 (10 Sep 2023 17:51)  T(F): 98.2 (11 Sep 2023 04:00), Max: 99.4 (10 Sep 2023 17:51)  HR: 69 (11 Sep 2023 06:00) (69 - 105)  BP: 114/73 (11 Sep 2023 04:00) (110/65 - 117/63)  BP(mean): 85 (11 Sep 2023 04:00) (79 - 85)  RR: 0 (11 Sep 2023 06:00) (0 - 24)  SpO2: 95% (11 Sep 2023 06:00) (94% - 100%)                            11.6   7.30  )-----------( 234      ( 11 Sep 2023 05:23 )             32.5     09-11    138  |  106  |  18  ----------------------------<  99  3.8   |  29  |  0.88    Ca    9.2      11 Sep 2023 05:23  Phos  3.4     09-11  Mg     2.2     09-11      I&O's Summary    10 Sep 2023 07:01  -  11 Sep 2023 07:00  --------------------------------------------------------  IN: 0 mL / OUT: 13 mL / NET: -13 mL

## 2023-09-11 NOTE — PROGRESS NOTE ADULT - PROVIDER SPECIALTY LIST ADULT
CT Surgery
Thoracic Surgery
Thoracic Surgery
Vascular Surgery
Vascular Surgery
Thoracic Surgery
Thoracic Surgery
Vascular Surgery

## 2023-09-11 NOTE — DISCHARGE NOTE NURSING/CASE MANAGEMENT/SOCIAL WORK - NSDCFUADDAPPT_GEN_ALL_CORE_FT
Leave dressing intact until tomorrow evening, reinforcing with tape if necessary (about 36 hours from chest tube removal). At that time you may remove the dressing and take a shower.  Call the office if you experience any fevers, shortness of breath, chest pain or excessive drainage from the incision, day or night. Go to the emergency room if any of these symptoms are severe. Take your medications as ordered and take a stool softener if needed with the narcotic medications.     Shower daily    No Ointments creams lotions to wounds    Do not submerge your body in water, showers only     Plan for Venogram with Vascular as outpatient - Dr Kim

## 2023-09-11 NOTE — DISCHARGE NOTE PROVIDER - NSDCFUSCHEDAPPT_GEN_ALL_CORE_FT
Leif Harris  [facility]  [facility] [address]  Scheduled Appointment: 09/20/2023

## 2023-09-11 NOTE — DISCHARGE NOTE PROVIDER - NSDCMRMEDTOKEN_GEN_ALL_CORE_FT
apixaban 5 mg oral tablet: 1 tab(s) orally every 12 hours  oxyCODONE 5 mg oral tablet: 1 tab(s) orally every 6 hours as needed for Moderate Pain (4 - 6) MDD: 4  Tylenol 325 mg oral tablet: 2 orally every 8 hours as needed for  mild pain

## 2023-09-11 NOTE — DISCHARGE NOTE PROVIDER - NSDCPNSUBOBJ_GEN_ALL_CORE
Subjective:  Pt in bed NAD.  TANVIR removed without issue     T(C): 36.8 (09-11-23 @ 08:55), Max: 37.4 (09-10-23 @ 17:51)  HR: 85 (09-11-23 @ 08:55) (69 - 104)  BP: 117/74 (09-11-23 @ 08:55) (114/66 - 117/74)  ABP: --  ABP(mean): --  RR: 18 (09-11-23 @ 08:55) (0 - 24)  SpO2: 100% (09-11-23 @ 08:55) (94% - 100%) RA   Wt(kg): --  CVP(mm Hg): --  CO: --  CI: --  PA: --                                              Tele: SR           09-11    138  |  106  |  18  ----------------------------<  99  3.8   |  29  |  0.88    Ca    9.2      11 Sep 2023 05:23  Phos  3.4     09-11  Mg     2.2     09-11                                 11.6   7.30  )-----------( 234      ( 11 Sep 2023 05:23 )             32.5                 CAPILLARY BLOOD GLUCOSE               CXR: < from: Xray Chest 1 View-PORTABLE IMMEDIATE (Xray Chest 1 View-PORTABLE IMMEDIATE .) (09.10.23 @ 16:59) >      FINDINGS: Heart size, mediastinal and hilar contours are unchanged within   normal limits. Again noted is a small to moderate-sized left apical   pneumothorax. No developing tension pneumothorax. The remainder the lung   zones are clear. Soft tissues and osseous structures are intact. Surgical   clips are noted over the left upper lung zone unchanged.      IMPRESSION:  No significant interval change.  Small to moderate left apical pneumothorax.  No developing tension pneumothorax.    < end of copied text >            Exam   Neuro:  Alert Awake NAD   Pulm: decreased at bases b/l   CV: RRR S1 S2   Abd:  Soft   Extremities:  warm UE/LE , LUE warm 2+ cap refill + FROM   Inc Left SCL C/D/I  TANVIR dressing c/d/i        Assessment:  28yMale    with PAST MEDICAL & SURGICAL HISTORY:  No pertinent past medical history      No significant past surgical history          26yo M with no PMH admitted for LUE Thoracic outlet syndrome now s/p Left Rib resection     Plan   TANVIR drain removed without issue   noted stable small PTX on CXR- Dr Harris aware   instructed pt if SOB/CP occurs to call the office or go to ER  Plan for Venogram as outpatient with Dr Kim  Pt to follow up with Dr Harris next week  Instructed pt on activity restrictions until he sees Dr Harris   pain meds sent to op pharmacy   DW Dr Harris

## 2023-09-11 NOTE — DISCHARGE NOTE PROVIDER - PROVIDER TOKENS
PROVIDER:[TOKEN:[[id number]],SCHEDULEDAPPT:[09/20/2023],SCHEDULEDAPPTTIME:[02:15 AM],ESTABLISHEDPATIENT:[T]] PROVIDER:[TOKEN:[[id number]]],FREE:[LAST:[Kimberly],FIRST:[Leif],PHONE:[phone]],FAX:[(   )    -],ADDRESS:[[address] [place] [address] [address]  [address]],SCHEDULEDAPPT:[09/20/2023],SCHEDULEDAPPTTIME:[02:15 AM]] PROVIDER:[TOKEN:[[id number]],FOLLOWUP:[1-3 days],SCHEDULEDAPPTTIME:[07:00 AM]],FREE:[LAST:[Kimberly],FIRST:[Leif],PHONE:[phone]],FAX:[(   )    -],ADDRESS:[[address] [place] [address]],SCHEDULEDAPPT:[09/20/2023],SCHEDULEDAPPTTIME:[02:15 AM]]

## 2023-09-11 NOTE — DISCHARGE NOTE PROVIDER - CARE PROVIDERS DIRECT ADDRESSES
[email or web address] ,DirectAddress_Unknown,DirectAddress_Unknown

## 2023-09-11 NOTE — PROGRESS NOTE ADULT - ASSESSMENT
26yo M with no PMH admitted for LUE Thoracic outlet syndrome now s/p Left 1st rib resection, POD#4  no venogram at this time  small pneumothorax on L, asymptomatic     Plan   Pain control prn  OOB ambulation   IS  PT eval: no weight bearing on LUE  Cont  ASA 81mg  neuro checks Q2 hours   TANVIR removal as per thoracic  dispo: dc home today on Eliquis      Plan will be discussed with Vascular surgery attending, Dr. Kim

## 2023-09-11 NOTE — DISCHARGE NOTE PROVIDER - NSDCCPTREATMENT_GEN_ALL_CORE_FT
PRINCIPAL PROCEDURE  Procedure: Surgical removal of rib or muscle release for decompression of thoracic outlet  Findings and Treatment:

## 2023-09-11 NOTE — DISCHARGE NOTE PROVIDER - NSDCCPCAREPLAN_GEN_ALL_CORE_FT
PRINCIPAL DISCHARGE DIAGNOSIS  Diagnosis: Thoracic outlet syndrome  Assessment and Plan of Treatment:

## 2023-09-11 NOTE — DISCHARGE NOTE PROVIDER - NSDCFUADDAPPT_GEN_ALL_CORE_FT
Leave dressing intact until tomorrow evening, reinforcing with tape if necessary (about 36 hours from chest tube removal). At that time you may remove the dressing and take a shower.  Call the office if you experience any fevers, shortness of breath, chest pain or excessive drainage from the incision, day or night. Go to the emergency room if any of these symptoms are severe. Take your medications as ordered and take a stool softener if needed with the narcotic medications.     Shower daily    No Ointments creams lotions to wounds    Do not submerge your body in water, showers only     Plan for Venogram with Vascular as outpatient  Leave dressing intact until tomorrow evening, reinforcing with tape if necessary (about 36 hours from chest tube removal). At that time you may remove the dressing and take a shower.  Call the office if you experience any fevers, shortness of breath, chest pain or excessive drainage from the incision, day or night. Go to the emergency room if any of these symptoms are severe. Take your medications as ordered and take a stool softener if needed with the narcotic medications.     Shower daily    No Ointments creams lotions to wounds    Do not submerge your body in water, showers only     Plan for Venogram with Vascular as outpatient - Dr Kim  Leave dressing intact until tomorrow evening, reinforcing with tape if necessary (about 36 hours from chest tube removal). At that time you may remove the dressing and take a shower.  Call the office if you experience any fevers, shortness of breath, chest pain or excessive drainage from the incision, day or night. Go to the emergency room if any of these symptoms are severe. Take your medications as ordered and take a stool softener if needed with the narcotic medications.     Shower daily    No Ointments creams lotions to wounds    Do not submerge your body in water, showers only     Plan for Venogram with Vascular as outpatient on Thursday (9/14/23)- Dr Kim

## 2023-09-12 LAB — SURGICAL PATHOLOGY STUDY: SIGNIFICANT CHANGE UP

## 2023-09-14 ENCOUNTER — APPOINTMENT (OUTPATIENT)
Dept: VASCULAR SURGERY | Facility: HOSPITAL | Age: 28
End: 2023-09-14

## 2023-09-14 ENCOUNTER — TRANSCRIPTION ENCOUNTER (OUTPATIENT)
Age: 28
End: 2023-09-14

## 2023-09-14 ENCOUNTER — APPOINTMENT (OUTPATIENT)
Dept: VASCULAR SURGERY | Facility: CLINIC | Age: 28
End: 2023-09-14

## 2023-09-14 ENCOUNTER — OUTPATIENT (OUTPATIENT)
Dept: OUTPATIENT SERVICES | Facility: HOSPITAL | Age: 28
LOS: 1 days | Discharge: HOME | End: 2023-09-14
Payer: COMMERCIAL

## 2023-09-14 VITALS
DIASTOLIC BLOOD PRESSURE: 68 MMHG | RESPIRATION RATE: 18 BRPM | TEMPERATURE: 98 F | OXYGEN SATURATION: 100 % | SYSTOLIC BLOOD PRESSURE: 110 MMHG | HEART RATE: 69 BPM

## 2023-09-14 VITALS
SYSTOLIC BLOOD PRESSURE: 133 MMHG | OXYGEN SATURATION: 100 % | HEART RATE: 78 BPM | HEIGHT: 72 IN | RESPIRATION RATE: 18 BRPM | TEMPERATURE: 98 F | WEIGHT: 160.06 LBS | DIASTOLIC BLOOD PRESSURE: 80 MMHG

## 2023-09-14 DIAGNOSIS — I82.B12 ACUTE EMBOLISM AND THROMBOSIS OF LEFT SUBCLAVIAN VEIN: ICD-10-CM

## 2023-09-14 PROCEDURE — C1894: CPT

## 2023-09-14 PROCEDURE — C1769: CPT

## 2023-09-14 PROCEDURE — C1887: CPT

## 2023-09-14 PROCEDURE — C1725: CPT

## 2023-09-14 PROCEDURE — 37248 TRLUML BALO ANGIOP 1ST VEIN: CPT

## 2023-09-14 RX ORDER — ACETAMINOPHEN 500 MG
2 TABLET ORAL
Qty: 0 | Refills: 0 | DISCHARGE

## 2023-09-14 NOTE — ASU DISCHARGE PLAN (ADULT/PEDIATRIC) - ASU DC SPECIAL INSTRUCTIONSFT
Please restart eliquis tonight 5 twice a day Please restart eliquis tonight 5 twice a day.    Pt cannot return to work for 8 WEEKS.

## 2023-09-14 NOTE — ASU DISCHARGE PLAN (ADULT/PEDIATRIC) - CARE PROVIDER_API CALL
Naren Kim  Vascular Surgery  [address] [place] [address], [address]  [place] [address]  Phone: [phone]  Fax: [phone]  Follow Up Time: 1 week

## 2023-09-14 NOTE — ASU DISCHARGE PLAN (ADULT/PEDIATRIC) - NS MD DC FALL RISK RISK
For information on Fall & Injury Prevention, visit: [email or web address] [place]  [email or web address] [place]  https://www.cdc.gov/steadi/patient.html

## 2023-09-20 ENCOUNTER — APPOINTMENT (OUTPATIENT)
Dept: THORACIC SURGERY | Facility: CLINIC | Age: 28
End: 2023-09-20
Payer: COMMERCIAL

## 2023-09-20 ENCOUNTER — NON-APPOINTMENT (OUTPATIENT)
Age: 28
End: 2023-09-20

## 2023-09-20 VITALS
DIASTOLIC BLOOD PRESSURE: 73 MMHG | WEIGHT: 160 LBS | HEIGHT: 72 IN | HEART RATE: 88 BPM | RESPIRATION RATE: 14 BRPM | BODY MASS INDEX: 21.67 KG/M2 | SYSTOLIC BLOOD PRESSURE: 114 MMHG | OXYGEN SATURATION: 100 %

## 2023-09-20 PROCEDURE — 99024 POSTOP FOLLOW-UP VISIT: CPT

## 2023-09-21 DIAGNOSIS — I82.B11 ACUTE EMBOLISM AND THROMBOSIS OF RIGHT SUBCLAVIAN VEIN: ICD-10-CM

## 2023-09-21 DIAGNOSIS — G54.0 BRACHIAL PLEXUS DISORDERS: ICD-10-CM

## 2023-10-09 NOTE — CHART NOTE - NSCHARTNOTEFT_GEN_A_CORE
CXR demonstrates new PTX L apex  This not present on priors    Repeated this afternoon, still present, no marked change    Pt asymptomatic on RA, ambulating    Discussed with Dr Oneal, plan to manage conservatively for now  Obtain interval CXR 6pm today
Operative Note addendum:    A small pneumothorax is an accepted result of the technique described in this operative report and is well described in the medical/surgical literature. It is not a complication.

## 2023-10-11 ENCOUNTER — NON-APPOINTMENT (OUTPATIENT)
Age: 28
End: 2023-10-11

## 2023-10-11 ENCOUNTER — APPOINTMENT (OUTPATIENT)
Dept: THORACIC SURGERY | Facility: CLINIC | Age: 28
End: 2023-10-11
Payer: COMMERCIAL

## 2023-10-11 VITALS
SYSTOLIC BLOOD PRESSURE: 112 MMHG | DIASTOLIC BLOOD PRESSURE: 63 MMHG | HEART RATE: 70 BPM | OXYGEN SATURATION: 100 % | BODY MASS INDEX: 21.67 KG/M2 | WEIGHT: 160 LBS | RESPIRATION RATE: 14 BRPM | HEIGHT: 72 IN

## 2023-10-11 PROCEDURE — 99024 POSTOP FOLLOW-UP VISIT: CPT

## 2023-10-12 ENCOUNTER — NON-APPOINTMENT (OUTPATIENT)
Age: 28
End: 2023-10-12

## 2023-10-18 ENCOUNTER — APPOINTMENT (OUTPATIENT)
Dept: VASCULAR SURGERY | Facility: CLINIC | Age: 28
End: 2023-10-18
Payer: COMMERCIAL

## 2023-10-18 PROCEDURE — 99024 POSTOP FOLLOW-UP VISIT: CPT

## 2023-10-18 PROCEDURE — 93971 EXTREMITY STUDY: CPT | Mod: LT

## 2023-10-26 NOTE — ED ADULT TRIAGE NOTE - PAIN RATING/NUMBER SCALE (0-10): REST
From: Shahzad Yen  To: Florin Brown  Sent: 10/26/2023 6:54 AM CDT  Subject: Knee injection    Aleksandar Rincon,  Would it be possible to have you do an injection when I come in for shoulder follow up next week?   0

## 2023-11-09 ENCOUNTER — NON-APPOINTMENT (OUTPATIENT)
Age: 28
End: 2023-11-09

## 2023-11-15 ENCOUNTER — APPOINTMENT (OUTPATIENT)
Dept: THORACIC SURGERY | Facility: CLINIC | Age: 28
End: 2023-11-15

## 2023-11-15 ENCOUNTER — NON-APPOINTMENT (OUTPATIENT)
Age: 28
End: 2023-11-15

## 2023-12-13 ENCOUNTER — NON-APPOINTMENT (OUTPATIENT)
Age: 28
End: 2023-12-13

## 2024-01-29 ENCOUNTER — APPOINTMENT (OUTPATIENT)
Dept: VASCULAR SURGERY | Facility: CLINIC | Age: 29
End: 2024-01-29
Payer: COMMERCIAL

## 2024-01-29 DIAGNOSIS — I82.90 ACUTE EMBOLISM AND THROMBOSIS OF UNSPECIFIED VEIN: ICD-10-CM

## 2024-01-29 PROCEDURE — 99213 OFFICE O/P EST LOW 20 MIN: CPT

## 2024-01-29 PROCEDURE — 93971 EXTREMITY STUDY: CPT

## 2024-03-20 PROBLEM — I82.90 THROMBUS: Status: ACTIVE | Noted: 2023-08-11

## 2024-03-20 NOTE — HISTORY OF PRESENT ILLNESS
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 Had venogram and thrombectomy done x2 with good result. Currently on eluquis for AC; he is compliant. He overall feels well left arm symptoms completely resolved at this point; denies swelling, aching, fullness, heaviness and pain. He complains of right 3rd digit numbness ever since he's had a midline catheter placed at the hospital. He otherwsie feels well. Had bloodwork done to monitor CORNELIO last Cr 1.5 which is much improved.  08/09/23: left arm venogram thrombectomy (Inari) and angioplasty 08/10/23: left arm venogram (emergent) thrombectomy/lysis with angiojet and angioplasty 09/07/23: Left first rib resection supra and infra clavicular approach (Dr. Harris, I assistred) 09/14/2023: Completion venogram significant improvement; small residual thrombus, angioplasty with good result [de-identified] : Since last visit doing well denies swelling, numbness and tingling in the LUE. He is able to exercise though not at full strength yet in the LUE. He has mild numbness in the skin near incision. He has no new complaints and is compiant with all meds.

## 2024-03-20 NOTE — ASSESSMENT
[FreeTextEntry1] : 27yo male with left vTOS now s/p venogram thrombectomy, first rib resection and completion venogram; doing well without issues -can stop AC now that we are 3months out -continue ASA 81mg follow up 6months

## 2024-03-20 NOTE — PHYSICAL EXAM
[JVD] : no jugular venous distention  [2+] : left 2+ [de-identified] : well appearing [de-identified] : wnl [FreeTextEntry1] : LUE: no edema 5/5 sensory and motor

## 2024-04-29 ENCOUNTER — APPOINTMENT (OUTPATIENT)
Dept: VASCULAR SURGERY | Facility: CLINIC | Age: 29
End: 2024-04-29
Payer: COMMERCIAL

## 2024-04-29 VITALS
SYSTOLIC BLOOD PRESSURE: 104 MMHG | DIASTOLIC BLOOD PRESSURE: 67 MMHG | HEART RATE: 65 BPM | OXYGEN SATURATION: 98 % | BODY MASS INDEX: 21.67 KG/M2 | WEIGHT: 160 LBS | HEIGHT: 72 IN

## 2024-04-29 DIAGNOSIS — G54.0 BRACHIAL PLEXUS DISORDERS: ICD-10-CM

## 2024-04-29 PROCEDURE — 99213 OFFICE O/P EST LOW 20 MIN: CPT

## 2024-04-29 PROCEDURE — 93971 EXTREMITY STUDY: CPT

## 2024-04-29 RX ORDER — APIXABAN 5 MG/1
5 TABLET, FILM COATED ORAL TWICE DAILY
Refills: 0 | Status: COMPLETED | COMMUNITY
End: 2024-04-29

## 2024-04-29 NOTE — HISTORY OF PRESENT ILLNESS
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 Had venogram and thrombectomy done x2 with good result. Currently on eluquis for AC; he is compliant. He overall feels well left arm symptoms completely resolved at this point; denies swelling, aching, fullness, heaviness and pain. He complains of right 3rd digit numbness ever since he's had a midline catheter placed at the hospital. He otherwsie feels well. Had bloodwork done to monitor CORNELIO last Cr 1.5 which is much improved.  08/09/23: left arm venogram thrombectomy (Inari) and angioplasty 08/10/23: left arm venogram (emergent) thrombectomy/lysis with angiojet and angioplasty 09/07/23: Left first rib resection supra and infra clavicular approach (Dr. Harris, I assistred) 09/14/2023: Completion venogram significant improvement; small residual thrombus, angioplasty with good result 1/29/2024: Since last visit doing well denies swelling, numbness and tingling in the LUE. He is able to exercise though not at full strength yet in the LUE. He has mild numbness in the skin near incision. He has no new complaints and is compliant with all meds. [de-identified] : doing well back to usual exercise regimen without left sided symptoms no swelling numbness tingling or heaviness her RIGHT middle finger decreased area of numbness overall improving

## 2024-04-29 NOTE — ASSESSMENT
[FreeTextEntry1] : 29yo male with left vTOS now s/p venogram thrombectomy, first rib resection and completion venogram; doing well without issues; discussed duplex findings with patient and his mom -continue ASA 81mg follow up 6months.

## 2024-04-29 NOTE — PHYSICAL EXAM
[2+] : left 2+ [de-identified] : well appearing [de-identified] : wnl [de-identified] : well healing incisions

## 2024-10-21 ENCOUNTER — APPOINTMENT (OUTPATIENT)
Dept: VASCULAR SURGERY | Facility: CLINIC | Age: 29
End: 2024-10-21

## 2024-10-21 PROCEDURE — 99213 OFFICE O/P EST LOW 20 MIN: CPT

## 2024-10-21 PROCEDURE — 93971 EXTREMITY STUDY: CPT

## 2025-05-02 NOTE — ED PROVIDER NOTE - DISPOSITION TYPE
line used, interpretor name Дмитрий, ID # [id number]    Mother confirms below, pt has a fever, runny nose, mild rash on her neck, and she is scratching at her ear often.     Mother states pt has had an ongoing runny nose for a while. The rash started Monday, scratching on her ear started Wednesday, and fever started yesterday. TMAX 103.1.    Mother states pt is \"restless\" but not overly fussy and is scratching her ear/ sticking finger in her ear very often.     Pt is breathing normally, mother denies concerns.     She is feeding a bit less than normal but mother reports normal wet diapers.     Reassured mother pt sounds safe but should be seen today, it sounds like she could have an ear infection. No remaining opening with any providers today (office is closing at 5 pm), advised to proceed to an ICC. Addresses provided to [facility] in the area. Mother verbalized understanding and agrees to plan.     Reason for Disposition   [1] Age < 2 years AND [2] ear infection suspected by triager    Protocols used: Colds-P-AH    
Called the phone number listed for father / indicated below ([phone]) automated message received the number is disconnected.     Attempted to call pt's mother ([phone] ) with the help of an interpretor.   line used, interpretor name Slade, ID # [id number] line connected but whomever answered said we had the wrong number and this was not Gwendolyn nor Melony's parent.     No other phone numbers in the chart to try contacting.       
Mother called stating that her daughter has had a fever of 100.2 since yesterday.    She still has cold symptoms.  Baby still has runny nose but it is clear and congestion.  She also has a rash in her throat.     Phone call was disconnected as I was informing mother. That the nurse would call her back.    Mother is at work,  please contact father he speaks english and has the child with him  [phone].    Please advise.   
Patient's mother called that she left a message earlier today and states no one has called her 's number and he told her no one called. I informed mother of message below and states that is the wrong number the right number is [phone]. Is requesting a call to her phone with an  since she is now home and baby has a fever of 103.1. Please call to further advise.    
ADMIT